# Patient Record
Sex: MALE | Race: WHITE | Employment: FULL TIME | URBAN - METROPOLITAN AREA
[De-identification: names, ages, dates, MRNs, and addresses within clinical notes are randomized per-mention and may not be internally consistent; named-entity substitution may affect disease eponyms.]

---

## 2018-07-13 LAB — COLONOSCOPY, EXTERNAL: NORMAL

## 2019-09-10 ENCOUNTER — OFFICE VISIT (OUTPATIENT)
Dept: CARDIOLOGY CLINIC | Age: 69
End: 2019-09-10

## 2019-09-10 VITALS
HEART RATE: 64 BPM | SYSTOLIC BLOOD PRESSURE: 142 MMHG | WEIGHT: 245.2 LBS | DIASTOLIC BLOOD PRESSURE: 62 MMHG | RESPIRATION RATE: 18 BRPM | BODY MASS INDEX: 37.16 KG/M2 | HEIGHT: 68 IN

## 2019-09-10 DIAGNOSIS — E66.01 SEVERE OBESITY (HCC): ICD-10-CM

## 2019-09-10 DIAGNOSIS — R00.2 PALPITATIONS: ICD-10-CM

## 2019-09-10 DIAGNOSIS — I49.9 IRREGULAR HEART RATE: Primary | ICD-10-CM

## 2019-09-10 DIAGNOSIS — I49.9 IRREGULAR HEART RATE: ICD-10-CM

## 2019-09-10 RX ORDER — LEVOTHYROXINE SODIUM 100 UG/1
100 TABLET ORAL
COMMUNITY
End: 2021-04-04

## 2019-09-10 RX ORDER — BENAZEPRIL HYDROCHLORIDE AND HYDROCHLOROTHIAZIDE 10; 12.5 MG/1; MG/1
TABLET ORAL DAILY
COMMUNITY
End: 2021-01-16

## 2019-09-10 RX ORDER — TADALAFIL 5 MG/1
5 TABLET ORAL DAILY
COMMUNITY
End: 2021-07-27

## 2019-09-10 RX ORDER — TESTOSTERONE CYPIONATE 200 MG/ML
INJECTION INTRAMUSCULAR EVERY 2 WEEKS
COMMUNITY
End: 2021-01-15

## 2019-09-10 RX ORDER — CLOPIDOGREL BISULFATE 75 MG/1
75 TABLET ORAL DAILY
COMMUNITY
End: 2021-04-04

## 2019-09-10 RX ORDER — MINOCYCLINE HYDROCHLORIDE 100 MG/1
100 CAPSULE ORAL DAILY
COMMUNITY
End: 2021-07-08

## 2019-09-10 RX ORDER — ATORVASTATIN CALCIUM 40 MG/1
TABLET, FILM COATED ORAL DAILY
COMMUNITY
End: 2020-05-20 | Stop reason: DRUGHIGH

## 2019-09-10 RX ORDER — TAMSULOSIN HYDROCHLORIDE 0.4 MG/1
0.4 CAPSULE ORAL DAILY
COMMUNITY
End: 2021-01-27 | Stop reason: SDUPTHER

## 2019-09-10 NOTE — LETTER
9/10/19 Patient: Dominick Hood YOB: 1950 Date of Visit: 9/10/2019 Marivel Fischer, [address] [facility] [address] [address] [place] [address] VIA Facsimile: [phone] Dear Marivel Fischer MD, Thank you for referring Mr. Dominick Hood to [address]  for evaluation. My notes for this consultation are attached. If you have questions, please do not hesitate to call me. I look forward to following your patient along with you.  
 
 
Sincerely, 
 
Ricardo Hatch MD

## 2019-09-10 NOTE — PROGRESS NOTES
Visit Vitals  /62 (BP 1 Location: Left arm)   Pulse 64   Resp 18   Ht 5' 8\" (1.727 m)   Wt 245 lb 3.2 oz (111.2 kg)   BMI 37.28 kg/m²     New patient. Here for an irregular heart rate.

## 2019-09-10 NOTE — PROGRESS NOTES
LAST OFFICE VISIT : Visit date not found        ICD-10-CM ICD-9-CM   1. Irregular heart rate I49.9 427.9   2. Severe obesity ([facility] [address] E66.01 278.01            Henna May is a 77 y.o. male with dyslipidemia, LEO, and HTN referred for irregular heart beat. Cardiac risk factors: dyslipidemia, HTN, male gender  I have personally obtained the history from the patient. HISTORY OF PRESENTING ILLNESS     Pt has never seen a cardiologist before. Pt has known he has an irregular heartbeat in the last 3 years because he feels it, but he has noticed it a lot more in the last year. Pt has noticed it at any particular time of day or with any particular activity. Pt noticed the irregular heartbeat most in May and noticed some tightness in his chest around that time also. His chest tightness was not associated with activity and it does not come upon an hour after eating. The chest tightness has never woken him out of sleep. It lasts about 5 minutes. Pt states that he has also experienced a rapid HR but has not experienced in within the past year. Pt states that for the past few years he has also noticed LE edema. Pt does not believe it is related to his diet. Pt started using injectable testosterone a month ago, before that he was using topical. Pt states that he has never had a heart attack, but he has had a mini-stroke in the past.  Pt's mother had a stroke. Pt notes that he is taking thyroid medicine and he believes his TSH is checked regularly. Pt wears his CPAP regularly. Pt flosses regularly. The patient denies chest pain/ shortness of breath, orthopnea, PND, LE edema, syncope, presyncope or fatigue. ACTIVE PROBLEM LIST     Patient Active Problem List    Diagnosis Date Noted    Severe obesity ([facility] [address] 09/10/2019           PAST MEDICAL HISTORY     No past medical history on file. PAST SURGICAL HISTORY     No past surgical history on file.        ALLERGIES     No Known Allergies       FAMILY HISTORY No family history on file. negative for cardiac disease       SOCIAL HISTORY     Social History     Socioeconomic History    Marital status:      Spouse name: Not on file    Number of children: Not on file    Years of education: Not on file    Highest education level: Not on file   Tobacco Use    Smoking status: Never Smoker    Smokeless tobacco: Never Used   Substance and Sexual Activity    Alcohol use: Never     Frequency: Never         MEDICATIONS     Current Outpatient Medications   Medication Sig    atorvastatin (LIPITOR) 40 mg tablet Take  by mouth daily.  benazepril-hydroCHLOROthiazide (LOTENSIN HCT) 10-12.5 mg per tablet Take  by mouth daily.  clopidogrel (PLAVIX) 75 mg tab Take  by mouth daily.  levothyroxine (SYNTHROID) 100 mcg tablet Take  by mouth Daily (before breakfast).  minocycline (MINOCIN, DYNACIN) 100 mg capsule Take  by mouth daily.  tamsulosin (FLOMAX) 0.4 mg capsule Take 0.4 mg by mouth daily.  tadalafil (CIALIS) 5 mg tablet Take 5 mg by mouth daily.  testosterone cypionate (DEPOTESTOTERONE CYPIONATE) 200 mg/mL injection by IntraMUSCular route Once every 2 weeks. No current facility-administered medications for this visit. I have reviewed the nurses notes, vitals, problem list, allergy list, medical history, family, social history and medications. REVIEW OF SYMPTOMS      General: Pt denies excessive weight gain or loss. Pt is able to conduct ADL's  HEENT: Denies blurred vision, headaches, hearing loss, epistaxis and difficulty swallowing. Respiratory: Denies cough, congestion, shortness of breath, SEGOVIA, wheezing or stridor.   Cardiovascular: Denies precordial pain, edema or PND Positive for palpitations  Gastrointestinal: Denies poor appetite, indigestion, abdominal pain or blood in stool  Genitourinary: Denies hematuria, dysuria, increased urinary frequency  Musculoskeletal: Denies joint pain or swelling from muscles or joints  Neurologic: Denies tremor, paresthesias, headache, or sensory motor disturbance  Psychiatric: Denies confusion, insomnia, depression  Integumentray: Denies rash, itching or ulcers. Hematologic: Denies easy bruising, bleeding     PHYSICAL EXAMINATION      Vitals:    09/10/19 1501   BP: 142/62   Pulse: 64   Resp: 18   Weight: 245 lb 3.2 oz (111.2 kg)   Height: 5' 8\" (1.727 m)     General: Well developed, in no acute distress. HEENT: No jaundice, oral mucosa moist, no oral ulcers  Neck: Supple, no stiffness, no lymphadenopathy, supple  Heart:  Normal S1/S2 negative S3 or S4. Regular, no murmur, gallop or rub, no jugular venous distention  Respiratory: Clear bilaterally x 4, no wheezing or rales  Abdomen:   Soft, non-tender, bowel sounds are active. Extremities:  1+ pitting edema, scars on both knees due to knee replacements  Musculoskeletal: No clubbing, no deformities  Neuro: A&Ox3, speech clear, gait stable, cooperative, no focal neurologic deficits  Skin: Skin color is normal. No rashes or lesions. Non diaphoretic, moist.  Vascular: 2+ pulses symmetric in all extremities        EKG: SB     DIAGNOSTIC DATA     No specialty comments available. LABORATORY DATA            No results found for: WBC, HGBPOC, HGB, HGBP, HCTPOC, HCT, PHCT, RBCH, PLT, MCV, HGBEXT, HCTEXT, PLTEXT, HGBEXT, HCTEXT, PLTEXT, HGBEXT, HCTEXT, PLTEXT   No results found for: NA, K, CL, CO2, AGAP, GLU, BUN, CREA, BUCR, GFRAA, GFRNA, CA, TBIL, TBILI, GPT, SGOT, AP, TP, ALB, GLOB, AGRAT, ALT        ASSESSMENT/RECOMMENDATIONS:.      1. Irregular heart beat  - I believe what he is experiencing are PAC's. In order to better define what is happening I favor checking a TSH as well as an echo to look at atrial size and a loop recorder. 2. HTN  - Blood pressure is borderline. I will not adjust any medicines at this time. If he is having a lot of PAC's I will consider placing him on a low dose of a BB.    3. Dyslipidemia  - We do not have any lipids on record. Will give him a lab slip to have his cholesterol checked. 4. LEO on CPAP  - Is tolerating and complaint  5. Return in 6 weeks or PRN. Orders Placed This Encounter    TSH 3RD GENERATION     Standing Status:   Future     Standing Expiration Date:   9/10/2020    HEPATIC FUNCTION PANEL     Standing Status:   Future     Standing Expiration Date:   9/10/2020    LIPID PANEL    LOOP MONITOR     Standing Status:   Future     Standing Expiration Date:   3/10/2020     Order Specific Question:   Reason for Exam:     Answer:   chest discomfort    AMB POC EKG ROUTINE W/ 12 LEADS, INTER & REP     Order Specific Question:   Reason for Exam:     Answer:   irregular heart rate    atorvastatin (LIPITOR) 40 mg tablet     Sig: Take  by mouth daily.  benazepril-hydroCHLOROthiazide (LOTENSIN HCT) 10-12.5 mg per tablet     Sig: Take  by mouth daily.  clopidogrel (PLAVIX) 75 mg tab     Sig: Take  by mouth daily.  levothyroxine (SYNTHROID) 100 mcg tablet     Sig: Take  by mouth Daily (before breakfast).  minocycline (MINOCIN, DYNACIN) 100 mg capsule     Sig: Take  by mouth daily.  tamsulosin (FLOMAX) 0.4 mg capsule     Sig: Take 0.4 mg by mouth daily.  tadalafil (CIALIS) 5 mg tablet     Sig: Take 5 mg by mouth daily.  testosterone cypionate (DEPOTESTOTERONE CYPIONATE) 200 mg/mL injection     Sig: by IntraMUSCular route Once every 2 weeks. Follow-up and Dispositions  ·   Return in about 6 months (around 3/10/2020). I have discussed the diagnosis with  Dominick Hood and the intended plan as seen in the above orders. Questions were answered concerning future plans. I have discussed medication side effects and warnings with the patient as well. Thank you,  Cristina Heard MD for involving me in the care of  Dominick Hood. Please do not hesitate to contact me for further questions/concerns.      Written by Elina Gaviria, as dictated by Ricardo Hatch MD. Vicente Del Real MD, [facility]    Patient Care Team:  Niall Mccray MD as PCP - General (Family Practice)    [address], [address] [place] [address] Drive      [phone] / [phone] Fax

## 2019-09-15 LAB
ALBUMIN SERPL-MCNC: 4.1 G/DL (ref 3.6–4.8)
ALP SERPL-CCNC: 81 IU/L (ref 39–117)
ALT SERPL-CCNC: 9 IU/L (ref 0–44)
AST SERPL-CCNC: 11 IU/L (ref 0–40)
BILIRUB DIRECT SERPL-MCNC: 0.2 MG/DL (ref 0–0.4)
BILIRUB SERPL-MCNC: 0.7 MG/DL (ref 0–1.2)
CHOLEST SERPL-MCNC: 97 MG/DL (ref 100–199)
HDLC SERPL-MCNC: 31 MG/DL
INTERPRETATION, 910389: NORMAL
LDLC SERPL CALC-MCNC: 43 MG/DL (ref 0–99)
PROT SERPL-MCNC: 6.1 G/DL (ref 6–8.5)
TRIGL SERPL-MCNC: 117 MG/DL (ref 0–149)
TSH SERPL DL<=0.005 MIU/L-ACNC: 2.41 UIU/ML (ref 0.45–4.5)
VLDLC SERPL CALC-MCNC: 23 MG/DL (ref 5–40)

## 2019-09-19 DIAGNOSIS — E66.01 SEVERE OBESITY (HCC): ICD-10-CM

## 2019-09-19 DIAGNOSIS — I49.9 IRREGULAR HEART RATE: Primary | ICD-10-CM

## 2019-09-19 DIAGNOSIS — R00.2 PALPITATIONS: ICD-10-CM

## 2019-09-24 ENCOUNTER — CLINICAL SUPPORT (OUTPATIENT)
Dept: CARDIOLOGY CLINIC | Age: 69
End: 2019-09-24

## 2019-09-24 DIAGNOSIS — E66.01 SEVERE OBESITY (HCC): ICD-10-CM

## 2019-09-24 DIAGNOSIS — I49.9 IRREGULAR HEART RATE: ICD-10-CM

## 2019-10-22 ENCOUNTER — DOCUMENTATION ONLY (OUTPATIENT)
Dept: CARDIOLOGY CLINIC | Age: 69
End: 2019-10-22

## 2019-10-22 NOTE — LETTER
10/29/2019 1:44 PM 
 
Mr. Candelaria [address] [id number] MrZe Adrian Mauricio, Your recent echocardiogram showed normal pumping function and mild leaky valve. Good BP control and low sodium diet will help with the leaky valve.   
 
 
 
 
 
 
 
 
Sincerely, 
 
 
Rosanne Anderson MD

## 2019-11-06 ENCOUNTER — OFFICE VISIT (OUTPATIENT)
Dept: CARDIOLOGY CLINIC | Age: 69
End: 2019-11-06

## 2019-11-06 VITALS
WEIGHT: 252.2 LBS | HEART RATE: 64 BPM | HEIGHT: 68 IN | DIASTOLIC BLOOD PRESSURE: 64 MMHG | BODY MASS INDEX: 38.22 KG/M2 | OXYGEN SATURATION: 96 % | SYSTOLIC BLOOD PRESSURE: 130 MMHG | RESPIRATION RATE: 16 BRPM

## 2019-11-06 DIAGNOSIS — E66.01 SEVERE OBESITY (HCC): ICD-10-CM

## 2019-11-06 DIAGNOSIS — R60.0 BILATERAL LEG EDEMA: Primary | ICD-10-CM

## 2019-11-06 DIAGNOSIS — G47.33 OSA ON CPAP: ICD-10-CM

## 2019-11-06 DIAGNOSIS — I49.9 IRREGULAR HEART RATE: Primary | ICD-10-CM

## 2019-11-06 DIAGNOSIS — I10 ESSENTIAL HYPERTENSION: ICD-10-CM

## 2019-11-06 DIAGNOSIS — Z99.89 OSA ON CPAP: ICD-10-CM

## 2019-11-06 DIAGNOSIS — R60.0 BILATERAL LEG EDEMA: ICD-10-CM

## 2019-11-06 NOTE — LETTER
11/6/19 Patient: Valdo Parker YOB: 1950 Date of Visit: 11/6/2019 Ganesh Centeno, [address] [address] VIA Facsimile: [phone] Dear Ganesh Centeno MD, Thank you for referring Mr. Valdo Parker to [address]  for evaluation. My notes for this consultation are attached. If you have questions, please do not hesitate to call me. I look forward to following your patient along with you.  
 
 
Sincerely, 
 
Vikki Mendez MD

## 2019-11-06 NOTE — PROGRESS NOTES
Visit Vitals  /64 (BP 1 Location: Left arm, BP Patient Position: Sitting)   Pulse 64   Resp 16   Ht 5' 8\" (1.727 m)   Wt 252 lb 3.2 oz (114.4 kg)   SpO2 96%   BMI 38.35 kg/m²

## 2019-11-06 NOTE — PROGRESS NOTES
LAST OFFICE VISIT : 9/10/19        ICD-10-CM ICD-9-CM   1. Irregular heart rate I49.9 427.9   2. Essential hypertension I10 401.9   3. LEO on CPAP G47.33 327.23    Z99.89 V46.8   4. Severe obesity ([facility] [address] E66.01 278.01   5. Bilateral leg edema R60.0 782. 3            Radha Zavala is a 77 y.o. male with dyslipidemia, LEO, and HTN referred for irregular heart beat. Cardiac risk factors: dyslipidemia, HTN, male gender  I have personally obtained the history from the patient. HISTORY OF PRESENTING ILLNESS       Radha Zavala reports he was experiencing skipped beats at times. Otherwise, he feels well. The patient denies chest pain/ shortness of breath, orthopnea, PND, LE edema, syncope, presyncope or fatigue. ACTIVE PROBLEM LIST     Patient Active Problem List    Diagnosis Date Noted    Severe obesity ([facility] 09/10/2019           PAST MEDICAL HISTORY     No past medical history on file. PAST SURGICAL HISTORY     No past surgical history on file. ALLERGIES     No Known Allergies       FAMILY HISTORY     No family history on file. negative for cardiac disease       SOCIAL HISTORY     Social History     Socioeconomic History    Marital status:      Spouse name: Not on file    Number of children: Not on file    Years of education: Not on file    Highest education level: Not on file   Tobacco Use    Smoking status: Never Smoker    Smokeless tobacco: Never Used   Substance and Sexual Activity    Alcohol use: Never     Frequency: Never         MEDICATIONS     Current Outpatient Medications   Medication Sig    atorvastatin (LIPITOR) 40 mg tablet Take  by mouth daily.  benazepril-hydroCHLOROthiazide (LOTENSIN HCT) 10-12.5 mg per tablet Take  by mouth daily.  clopidogrel (PLAVIX) 75 mg tab Take  by mouth daily.  levothyroxine (SYNTHROID) 100 mcg tablet Take  by mouth Daily (before breakfast).  minocycline (MINOCIN, DYNACIN) 100 mg capsule Take  by mouth daily.     tamsulosin (FLOMAX) 0.4 mg capsule Take 0.4 mg by mouth daily.  tadalafil (CIALIS) 5 mg tablet Take 5 mg by mouth daily.  testosterone cypionate (DEPOTESTOTERONE CYPIONATE) 200 mg/mL injection by IntraMUSCular route Once every 2 weeks. No current facility-administered medications for this visit. I have reviewed the nurses notes, vitals, problem list, allergy list, medical history, family, social history and medications. REVIEW OF SYMPTOMS      General: Pt denies excessive weight gain or loss. Pt is able to conduct ADL's  HEENT: Denies blurred vision, headaches, hearing loss, epistaxis and difficulty swallowing. Respiratory: Denies cough, congestion, shortness of breath, SEGOVIA, wheezing or stridor. Cardiovascular: Denies precordial pain, edema or PND  +palpitations  Gastrointestinal: Denies poor appetite, indigestion, abdominal pain or blood in stool  Genitourinary: Denies hematuria, dysuria, increased urinary frequency  Musculoskeletal: Denies joint pain or swelling from muscles or joints  Neurologic: Denies tremor, paresthesias, headache, or sensory motor disturbance  Psychiatric: Denies confusion, insomnia, depression  Integumentray: Denies rash, itching or ulcers. Hematologic: Denies easy bruising, bleeding     PHYSICAL EXAMINATION      Vitals:    11/06/19 1357   BP: 130/64   Pulse: 64   Resp: 16   SpO2: 96%   Weight: 252 lb 3.2 oz (114.4 kg)   Height: 5' 8\" (1.727 m)     General: Well developed, in no acute distress. HEENT: No jaundice, oral mucosa moist, no oral ulcers  Neck: Supple, no stiffness, no lymphadenopathy, supple  Heart:  Normal S1/S2 negative S3 or S4. Regular, no murmur, gallop or rub, no jugular venous distention  Respiratory: Clear bilaterally x 4, no wheezing or rales  Abdomen:   Soft, non-tender, bowel sounds are active.    Extremities:  1-2+ pitting LE edema, scars on both knees due to knee replacements  Musculoskeletal: No clubbing, no deformities  Neuro: A&Ox3, speech clear, gait stable, cooperative, no focal neurologic deficits  Skin: Skin color is normal. No rashes or lesions. Non diaphoretic, moist.  Vascular: 2+ pulses symmetric in all extremities        EKG:      DIAGNOSTIC DATA     1. Echo  10/21/19-EF 60%, LAE, AV sclerosis, Mild AI/MR/TR/PI, Pulmonary arterial systolic pressure is 90.1 mmHg. 2. Stress Test  10/21/19-Cardiolite- normal, mets 10.1    3. Lipids  9/14/19- TC 97, HDL 31, LDL 43,          LABORATORY DATA            No results found for: WBC, HGBPOC, HGB, HGBP, HCTPOC, HCT, PHCT, RBCH, PLT, MCV, HGBEXT, HCTEXT, PLTEXT, HGBEXT, HCTEXT, PLTEXT, HGBEXT, HCTEXT, PLTEXT   Lab Results   Component Value Date/Time    Bilirubin, total 0.7 09/14/2019 10:45 AM    AST (SGOT) 11 09/14/2019 10:45 AM    Alk. phosphatase 81 09/14/2019 10:45 AM    Protein, total 6.1 09/14/2019 10:45 AM    Albumin 4.1 09/14/2019 10:45 AM    ALT (SGPT) 9 09/14/2019 10:45 AM           ASSESSMENT/RECOMMENDATIONS:.      1. Irregular heart beat  - He wore a loop recorder with 6 significant events found to be bigeminal PVCs  - TSH was normal, 2.4  - Echo was normal with mild MR and TR  - Stress test was normal with no ectopy with exercise  - Will refrain from any treatment at this time since his sxs have improved and he feels this is related to stress at work. We reviewed the timing of these events. 2. HTN  - Blood pressure is borderline. I will not adjust any medicines at this time. If he is having a lot of PAC's I will consider placing him on a low dose of a BB. 3. Dyslipidemia  - Lipids are at goal on current medical regimen    4. LEO on CPAP  - Is tolerating and complaint    5. Possible TIA  - He is on Plavix    7. LE edema  - I think his edema is related to his bilateral knee replacements and my be a component of lymphedema  - I favor him seeing the lymphedema clinic and having venous studies on bilateral extremities. Return in 6 months or PRN.      No orders of the defined types were placed in this encounter. Follow-up and Dispositions  ·   Return in about 6 months (around 5/6/2020). I have discussed the diagnosis with  [facility] and the intended plan as seen in the above orders. Questions were answered concerning future plans. I have discussed medication side effects and warnings with the patient as well. Thank you,  Jens Girard MD for involving me in the care of  Sandy Garcia. Please do not hesitate to contact me for further questions/concerns. Written by Joanna Thomas, as dictated by Dennis Wade MD.      Kumar Mccoy. Maral Pfeiffer MD, [facility]    Patient Care Team:  Jens Girard MD as PCP - General (Family Practice)  Jens Girard MD (Family Practice)    [address], [address] [place] [address]      [phone] / [phone] Fax

## 2019-12-10 ENCOUNTER — TELEPHONE (OUTPATIENT)
Dept: CARDIOLOGY CLINIC | Age: 69
End: 2019-12-10

## 2019-12-10 NOTE — TELEPHONE ENCOUNTER
Patient is inquiring about the results of his vascular test. Patient has requested to call after 3:30 pm    Phone: [phone]

## 2020-01-22 LAB
CREATININE, EXTERNAL: 1
HBA1C MFR BLD HPLC: 5.8 %
LDL-C, EXTERNAL: 53
PSA, EXTERNAL: 4.6

## 2020-02-11 DIAGNOSIS — Z79.52 LONG TERM CURRENT USE OF SYSTEMIC STEROIDS: ICD-10-CM

## 2020-02-11 LAB — AMB DEXA, EXTERNAL: NORMAL

## 2020-02-11 PROCEDURE — 77080 DXA BONE DENSITY AXIAL: CPT

## 2020-03-19 DIAGNOSIS — I49.9 IRREGULAR HEART RATE: ICD-10-CM

## 2020-03-19 DIAGNOSIS — E66.01 SEVERE OBESITY (HCC): ICD-10-CM

## 2020-03-19 DIAGNOSIS — R00.2 PALPITATIONS: ICD-10-CM

## 2020-05-19 NOTE — PROGRESS NOTES
VIRTUAL VISIT DOCUMENTATION     Pursuant to the emergency declaration under the [facility] waiver authority and the [employer] Act, this Virtual  Visit was conducted, with patient's consent, to reduce the patient's risk of exposure to COVID-19 and provide continuity of care for an established patient. Services were provided through a video synchronous discussion virtually to substitute for in-person clinic visit. CHIEF COMPLAINT      Maddi Isbell is a 77 y.o. male who was seen by synchronous (real-time) audio-video technology on 5/20/2020. Patient is being seen today for hypertension, palpitations, LEO on CPAP, and bilateral lower extremity edema. ASSESSMENT        ICD-10-CM ICD-9-CM    1. Essential hypertension I10 401.9    2. Palpitations R00.2 785.1    3. LEO on CPAP G47.33 327.23     Z99.89 V46.8    4. Bilateral leg edema R60.0 782.3         PLAN   1. Irregular heart beat/fluttering   - He wore a loop recorder with 6 significant events found to be bigeminal PVCs  -Irregularity is less seems to be associated with stress recently.     2. HTN  - Blood pressure is has been at  and he says it has been normal  -less sodium     3. Dyslipidemia  - Lipids are at goal on current medical regimen primary care is reduce his Crestor recently to 20 mg  -An LDL goal is 70 or lower  -     4. LEO on CPAP  - Is tolerating and complaint     5. Possible TIA  - He is on Plavix     Follow-up with me in 6 months or PRN    We discussed the expected course, resolution and complications of the diagnosis(es) in detail. Medication risks, benefits, costs, interactions, and alternatives were discussed as indicated. I advised him to contact the office if his condition worsens, changes or fails to improve as anticipated.  He expressed understanding with the diagnosis(es) and plan    HISTORY OF PRESENTING ILLNESS      Marin Rodriguez Anna Leon is a 77 y.o. male   with dyslipidemia, LEO, and HTN referred for irregular heart beat. He states he has been doing well. Several weeks ago he had a short possibly 40 seconds. Of some skipping in his heartbeat I believe. 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 We reviewed his last loop recorder he did have periods of fast heart rates up to 140s and at night when he was sleeping or down to 40 something. He knows that if he gets dizzy he is to call.     Cardiac risk factors: dyslipidemia, HTN, male gender  I have personally obtained the history from the patient.             ATTENTION:   This medical record was transcribed using an electronic medical records/speech recognition system. Although proofread, it may and can contain electronic, spelling and other errors. Corrections may be executed at a later time. Please feel free to contact us for any clarifications as needed. ACTIVE PROBLEM LIST     Patient Active Problem List    Diagnosis Date Noted    Severe obesity ([facility] [address] 09/10/2019           PAST MEDICAL HISTORY     No past medical history on file. PAST SURGICAL HISTORY     No past surgical history on file. ALLERGIES     No Known Allergies       FAMILY HISTORY     No family history on file. negative for cardiac disease       SOCIAL HISTORY     Social History     Socioeconomic History    Marital status:      Spouse name: Not on file    Number of children: Not on file    Years of education: Not on file    Highest education level: Not on file   Tobacco Use    Smoking status: Never Smoker    Smokeless tobacco: Never Used   Substance and Sexual Activity    Alcohol use: Never     Frequency: Never         MEDICATIONS     Current Outpatient Medications   Medication Sig    atorvastatin (Lipitor) 20 mg tablet Take 20 mg by mouth daily.  latanoprost (XALATAN) 0.005 % ophthalmic solution Administer 1 Drop to both eyes nightly.  benazepril-hydroCHLOROthiazide (LOTENSIN HCT) 10-12.5 mg per tablet Take  by mouth daily.  clopidogrel (PLAVIX) 75 mg tab Take  by mouth daily.  levothyroxine (SYNTHROID) 100 mcg tablet Take  by mouth Daily (before breakfast).  minocycline (MINOCIN, DYNACIN) 100 mg capsule Take  by mouth daily.  tamsulosin (FLOMAX) 0.4 mg capsule Take 0.4 mg by mouth daily.  tadalafil (CIALIS) 5 mg tablet Take 5 mg by mouth daily.  testosterone cypionate (DEPOTESTOTERONE CYPIONATE) 200 mg/mL injection by IntraMUSCular route Once every 2 weeks. No current facility-administered medications for this visit. I have reviewed the nurses notes, vitals, problem list, allergy list, medical history, family, social history and medications. REVIEW OF SYMPTOMS     Constitutional: Negative for fever, chills, malaise/fatigue and diaphoresis. Respiratory: Negative for cough, hemoptysis, sputum production, shortness of breath and wheezing. Cardiovascular: Negative for chest pain, palpitations, orthopnea, claudication, leg swelling and PND. Gastrointestinal: Negative for heartburn, nausea, vomiting, blood in stool and melena. Genitourinary: Negative for dysuria and flank pain. Musculoskeletal: Negative for joint pain and back pain. Skin: Negative for rash. Neurological: Negative for focal weakness, seizures, loss of consciousness, weakness and headaches. Endo/Heme/Allergies: Negative for abnormal bleeding. Psychiatric/Behavioral: Negative for memory loss. PHYSICAL EXAMINATION      Due to this being a TeleHealth evaluation, many elements of the physical examination are unable to be assessed. General: Well developed, in no acute distress, cooperative and alert  HEENT: Pupils equal/round. No marked JVD visible on video.   Respiratory: No audible wheezing, no signs of respiratory distress, lips non cyanotic  Extremities:  No edema  Neuro: A&Ox3, speech clear, no facial droop, answering questions appropriately  Skin: Skin color is normal. No rashes or lesions. Non diaphoretic on visible skin during exam       DIAGNOSTIC DATA      1. Echo  10/21/19-EF 60%, LAE, AV sclerosis, Mild AI/MR/TR/PI, Pulmonary arterial systolic pressure is 25.8 mmHg. 2. Stress Test  10/21/19-Cardiolite- normal, mets 10.1    3. Lipids  9/14/19- TC 97, HDL 31, LDL 43,   1/23/20- , HDL 40, LDL 53,     4. LE Duplex  11/13/19-No evidence of deep vein thrombosis or venous obstruction within the lower extremities bilaterally       LABORATORY DATA    No results found for: WBC, HGBPOC, HGB, HGBP, HCTPOC, HCT, PHCT, RBCH, PLT, MCV, HGBEXT, HCTEXT, PLTEXT, HGBEXT, HCTEXT, PLTEXT   Lab Results   Component Value Date/Time    Bilirubin, total 0.7 09/14/2019 10:45 AM    AST (SGOT) 11 09/14/2019 10:45 AM    Alk. phosphatase 81 09/14/2019 10:45 AM    Protein, total 6.1 09/14/2019 10:45 AM    Albumin 4.1 09/14/2019 10:45 AM    ALT (SGPT) 9 09/14/2019 10:45 AM             FOLLOW-UP     Follow-up and Dispositions    · Return in about 6 months (around 11/20/2020). Patient was made aware and verbalized understanding that an appointment will be scheduled for them for a virtual visit and/or office visit within the above time frame. Patient understanding his/her responsibility to call and change time/date if he/she so chooses. Thank you, Josette Pitts NP for allowing me to participate in the care of Elise Cui. Please do not hesitate to contact me for further questions/concerns. Greater than 20 minutes was spent in direct video patient care, planning and chart review. This visit was conducted using [employer] services.        Bhavana Rivas MD    [address], [address] [place] [address]        [phone] / [phone] Fax       [facility] [facility]  [place]le Spence  [phone] / [phone] Fax

## 2020-05-20 ENCOUNTER — VIRTUAL VISIT (OUTPATIENT)
Dept: CARDIOLOGY CLINIC | Age: 70
End: 2020-05-20

## 2020-05-20 DIAGNOSIS — R00.2 PALPITATIONS: ICD-10-CM

## 2020-05-20 DIAGNOSIS — G47.33 OSA ON CPAP: ICD-10-CM

## 2020-05-20 DIAGNOSIS — R60.0 BILATERAL LEG EDEMA: ICD-10-CM

## 2020-05-20 DIAGNOSIS — Z99.89 OSA ON CPAP: ICD-10-CM

## 2020-05-20 DIAGNOSIS — I10 ESSENTIAL HYPERTENSION: Primary | ICD-10-CM

## 2020-05-20 RX ORDER — LATANOPROST 50 UG/ML
1 SOLUTION/ DROPS OPHTHALMIC
COMMUNITY

## 2020-05-20 RX ORDER — ATORVASTATIN CALCIUM 20 MG/1
20 TABLET, FILM COATED ORAL DAILY
COMMUNITY
End: 2021-02-01 | Stop reason: SDUPTHER

## 2020-07-22 ENCOUNTER — HOSPITAL ENCOUNTER (OUTPATIENT)
Dept: PHYSICAL THERAPY | Age: 70
Discharge: HOME | End: 2020-07-22
Payer: COMMERCIAL

## 2020-07-22 PROCEDURE — 97140 MANUAL THERAPY 1/> REGIONS: CPT

## 2020-07-22 PROCEDURE — 97535 SELF CARE MNGMENT TRAINING: CPT

## 2020-07-22 PROCEDURE — 97161 PT EVAL LOW COMPLEX 20 MIN: CPT

## 2020-07-22 NOTE — PROGRESS NOTES
[facility] [address] [facility]  [address] [place] [address] [facility]  [address]      INITIAL EVALUATION    NAME: James Sal AGE: 77 y.o. GENDER: male  DATE: 07/22/20  REFERRING PHYSICIAN: Pat Mathur MD (cardiology)  HISTORY AND BACKGROUND:   Primary Diagnosis:  · BLE lymphedema, secondary (I89.0)  Other Treatment Diagnoses:   Swelling not relieved by elevation (R60.0 localized edema,)   Venous insufficiency I87.2;     Date of Onset: reports a 20+ year history of fluctuating LE edema, mostly in the foot and ankle. In the past few years exacerbated by varicose issues    Present Symptoms and Functional Limitations: LE swelling has fluctuations and difficulty with wearing shoes. Reports improvement in the past few months      Lower Extremity Functional Scale: 15/68    Past Medical History: No past medical history on file. No past surgical history on file. Current Medications:    Current Outpatient Medications   Medication Sig    atorvastatin (Lipitor) 20 mg tablet Take 20 mg by mouth daily.  latanoprost (XALATAN) 0.005 % ophthalmic solution Administer 1 Drop to both eyes nightly.  benazepril-hydroCHLOROthiazide (LOTENSIN HCT) 10-12.5 mg per tablet Take  by mouth daily.  clopidogrel (PLAVIX) 75 mg tab Take  by mouth daily.  levothyroxine (SYNTHROID) 100 mcg tablet Take  by mouth Daily (before breakfast).  minocycline (MINOCIN, DYNACIN) 100 mg capsule Take  by mouth daily.  tamsulosin (FLOMAX) 0.4 mg capsule Take 0.4 mg by mouth daily.  tadalafil (CIALIS) 5 mg tablet Take 5 mg by mouth daily.  testosterone cypionate (DEPOTESTOTERONE CYPIONATE) 200 mg/mL injection by IntraMUSCular route Once every 2 weeks. No current facility-administered medications for this encounter.       Allergies: No Known Allergies   Prior Level of Function/Social/Work History/Personal factors and/or comorbidities impacting plan of care: still working as a special  for [employer], active with yard work and grandkids   Living Situation: home      Trainable Caregiver?: wife   Self-care/ADLs: independent     Mobility: independent   Sleeping Arrangement:  In a bed   [address] [place] [address] Street: none   Other:   Previous Therapy:  none  Compression/Lymphedema Equipment:  None. Does report Mal hose usage following his knee replacements, but no other compression garment usage    SUBJECTIVE:   Patient reports he actually forgot about cardiologist recommending compression and referral to lymphedema due to time between appt (November) and now. COVID led to waiting list and now patient is just getting to this clinic. He states his swelling has improved since November but reports no changes in activity or medications. He states swelling fluctuates and responds to elevation and being off his feet. He is a  and will be returning to work next month and will be on his feet more often. Patients goals for therapy: \"reduce frequency of episodes\"    OBJECTIVE DATA SUMMARY:   EXAMINATION/PRESENTATION/DECISION MAKING:   Pain:    0/10        Self-Care and ADLs:  Grooming: Independent  Bathing: Independent    UB Dressing: Independent  LB Dressing: Independent    Don/Doff Shoes/Socks:  Independent  Toileting: Independent    Other:       Skin and Tissue Assessment:  Dermal Status:  [x]   Intact []  Dry   []  Tenuous [] Flaky   []  Wound/lesion present []  Scars   []  Dermatitis    Texture/Consistency:  [x]  Boggy [x]  Pitting Edema   []  Brawny []  Combination   []  Fibrotic/Woody    Pigmentation/Color Change:  [x]  Normal []  Hemosiderin   []  Red []  Erythematous   []  Hyperpigmented []  Hyperlipodermatosclerosis   Anomalies:  []  Lymphorrhea []  Vesicles   []  Petechiae []  Warty Vercusis   []  Bullae []  Papilloma   Circulatory:  []  XENA [x]  Varicosities: varicous veins present   []  Pulses [x]  Vascular studies ruled out DVT in past   []  DVT History    Nails:  [x]   Normal  []   Fungus  Stemmers Sign: negative  Vitals  Blood pressure  138/62  Heart rate (bpm)  65  Oxygen saturation 99%  Height:    5'9 Weight:    228.8 lbs  BMI:   33.8  (36 or greater: adversely affecting lymphedema)  Wound/Ulcer:  no      Wound Pain:   none  Volumetric Measurements:   Right:  3535.05mL Left:  3420.34mL   % Difference: 3.16 Dominance: Right   (See scanned graph)  Range of Motion: WFL  Strength: WFL  Sensation:  Intact  Mobility:  Bed/Chair Mobility:  Independent  Transfers:  Independent    Sitting Balance:  intact Standing Balance:  intact   Gait:  Independent  Wheelchair Mobility:  n/a   Endurance:  good Stairs:  Independent          Safety:  Patient is alert and oriented:  x4   Safety awareness:  yes   Fall Risk?:  no   Patient given written fall prevention handout: Yes   Precautions:  Standard lymphedema precautions to include avoiding blood pressure readings, injections and IVs or other procedures/acts that could lead to broken skin on affected area, and avoiding excessive heat, resistive activity or altitude without compression garment    Functional Measure:   LLIS: 15/68,  22% impairment    Physical Therapy Evaluation Charge Determination   History Examination Presentation Decision-Making   MEDIUM  Complexity : 1-2 comorbidities / personal factors will impact the outcome/ POC  LOW Complexity : 1-2 Standardized tests and measures addressing body structure, function, activity limitation and / or participation in recreation  LOW Complexity : Stable, uncomplicated  Other outcome measures LLIS  LOW       Based on the above components, the patient evaluation is determined to be of the following complexity level: LOW     Evaluation Time: 9:40-10:00    TREATMENT PROVIDED:   1.   Treatment description:    The patient was educated regarding the role and function of the lymphatic system, pathophysiology of lymphedema/lipedema, and instructed in the lymphedema management protocol of complete decongestive therapy (CDT). This includes skin care to prevent breakdown or infection, lymphedema exercises, custom compression therapy options (bandaging, compression garments, compression pump, Gonzella Matters, [employer], [employer] Kareem-Oswaldo, etc. as needed), and decongestion with manual lymphatic drainage as indicated. We discussed the need for consistent compression system for lymphedema management. Diaphragmatic breathing instruction and skin care education was performed,applying low pH lotion to extremity using upward strokes to stimulate lymphatic vessels. Patient with combination edema complicated by vascular insufficiency. Patient reports edema fluctuates and wearing compression he may not perform \"everyday\". IT was recommended daily daytime use of compression. Decision to proceeded with ready made stockings. Patient does have ankle involvement that may cause adverse fitting and may have to progress to custom stockings if he can not tolerate ready made stockings. Treatment time:  10:00-10:25  Minutes: 25    2. Treatment description:  Manual therapy  Patient measured for ready made Juzo 3215 CCL2 knee high, beige, closed toe stockings for bilateral LE. DME sent to [address] [place] [address] for obtaining garments  Treatment time:  10:25-10:35  Minutes: 10    ASSESSMENT:   Laura Thompson is a 77 y.o. male who presents with Stage II combination edema with venous insuffiencey involvement. Patient leg discrepancy 3.16% and able to be measured for ready made garments. Patient will return once garments arrive for fitting. Patient will benefit from complete decongestive therapy (CDT) including manual lymphatic drainage (MLD) technique, short-stretch textile bandages/compression system to decongest limb, and kinesiotaping as appropriate.   Patient will receive instruction in proper skin care to recognize signs/symptoms of and prevent infection, therapeutic exercise, and self-MLD for independent home program and restorative lymphatic performance. This care is medically necessary due to the infection risk with lymphedema, and to improve functional activities. CDT is necessary to resolve swelling to allow patient to return to wearing normal clothes/footwear, and prevent worsening of symptoms, such as venous stasis ulcerations, infections, or hospitalizations. Patient will be independent with home program strategies to allow improved ADL ability and mobility and to allow patient to return to greatest functional independence. Rehabilitation potential is considered to be good. Factors which may influence rehabilitation potential include venous involvement. Patient will benefit from 2-3 physical therapy visits over 6-8 weeks to optimize improvement in these areas. PLAN OF CARE:   Recommendations and Planned Interventions:  Manual lymph drainage/complete decongestive therapy  Multi-layer compression bandaging (short-stretch)  Compression garment fitting/provision  Lymphedema therapeutic exercise  AROM/PROM/Strength/Coordination  Self-care training  Functional mobility training  Education in skin care and lymphedema precautions  Self-MLD education per home program  Self-bandaging education per home program  Caregiver education as needed  Wound care as needed     GOALS    Long term goals  Time frame: 6-8 weeks  1.   Patient will have knowledge of the compression options and acquire a safe and       appropriate daytime and night time compression system to prevent             re-accumulation of fluid. 2.  Patient will be able to don/doff garments independently.  Garment system effectiveness will be evaluated prior to discharge for better long-term  management and outcomes. 3.   To transition patient to independent restorative phase of CDT.      Patient has participated in goal setting and agrees to work toward plan of care.   Patient was instructed to call if questions or concerns arise. Thank you for this referral.  Malik Pham, PT, DPT   Time Calculation: 60 mins    TREATMENT PLAN EFFECTIVE DATES:   7/22/2020 TO 10/20/2020  I have read the above plan of care for Yolanda Russell. I certify the above prescribed services are required by this patient and are medically necessary.   The above plan of care has been developed in conjunction with the lymphedema/physical therapist.       Physician Signature: ____________________________________Date:______________

## 2020-08-19 ENCOUNTER — HOSPITAL ENCOUNTER (OUTPATIENT)
Dept: PHYSICAL THERAPY | Age: 70
Discharge: HOME | End: 2020-08-19
Payer: COMMERCIAL

## 2020-08-19 PROCEDURE — 97760 ORTHOTIC MGMT&TRAING 1ST ENC: CPT

## 2020-08-19 NOTE — PROGRESS NOTES
_                                    [facility] [address] [facility]  [address] [place] [address].  [address]  Priti Olivares        LYmphedema Therapy/discharge  Visit: 2    [x]                  Daily note               []                 30 day/10th visit progress note    NAME: Jen Martin  DATE: 8/19/2020    GOALS  Long term goals  Time frame: 6-8 weeks  1.   Patient will have knowledge of the compression options and acquire a safe and       appropriate daytime and night time compression system to prevent             re-accumulation of fluid. MET   2.  Patient will be able to don/doff garments independently.  Garment system effectiveness will be evaluated prior to discharge for better long-term  management and outcomes. MET  3.   To transition patient to independent restorative phase of CDT.   MET       SUBJECTIVE REPORT: Patient arrives with purchased ready made Juzo compression stockings. Patient reports trying to don himself prior to visit and having difficulty due to orthopedic limitations.     Pain:                                Gait:  No change since eval  []  Independent gait without any assistive device for community distances  ADLs:  No change  Treatment Response:  No change  []   Patient reviewed packet received at evaluation  []   Patient completed home program as prescribed  []   Patient not fully compliant with home program to date  []   Patient waiting on compression garment arrival  Function: no change  []   Patient requiring less assistance with completion of home program  []   ADLs are requiring less assistance   []   Patient able to return to work/leisure activities    TREATMENT AND OBJECTIVE DATA SUMMARY:   Patient/Family Education:      Educated in skin care: []   Skin care products  []   Hygiene  []  Prevention of cellulitis  []   Wound care     Educated in exercise: []   Walking program  []   Zofia ball routine  []   Stick routine  []   ROM routine     Instructed in self MLD:   Written sequence given and reviewed with patient as well as demonstration and instruction during MLD portion of the session. Instructed in don/doff of compression system:   []   Multi layer bandage (MLB) donning principles and wear precautions  []   Day garments  []   Night garments     Therapeutic Activity  minutes   Treatment time:   Functional Mobility:    Fall risk:      Therapeutic Exercise/Procedure  minutes   Treatment time:   Zofia ball exercise program: Demonstration by therapist of written routine. Patient performed each x5 reps. Stick exercise program: N/A   Free exercises/ROM: N/A   Home program: Patient to perform daily to BID:  []   Skin care  []   Deep abdominal breathing  []   Exercise routine  []   Walking program  []   Rest in supine   []   Compression bandage  []   Compression garments  []   Vasopneumatic device  []   Wound care  []   Self MLD  []   Bring supplies to each therapy visit  []   Purchase necessary supplies  []   Weight loss program  []   Follow up with       Rationale: Exercise will increase the lymph angiomotoricity and tissue pressure of the skin and thus decrease swelling.      Modalities  minutes   Treatment time:   Vasopneumatic pump:      Orthotic Management : 9:45-10:45 am 60 minutes   Treatment time:   Area to decongest:    [] UE          []  Right     []  Left      [] Trunk      [] LE             []  Right     []  Left      [] Trunk         Sequence used and effectiveness: [] Secondary/Primary sequence for upper extremity with / without trunk involvement    [] Secondary/Primary sequence for lower extremities with / without trunk involvement     [] Modifications were made to manual lymph drainage sequence to exclude cervical techniques secondary to patient's age    [] Self MLD sequence/techniques/hand strokes   Skin/wound care/debridement:    Upper/Lower extremity compression:  patient with increased difficulty with forward flexion and knee flexion due to orthopedic issues. He required the use of Medi donning cerna to don the stockings. Fit adjustments required use of dysum on the floor and donning gloves for the patient to independently manage donning. Instructed and performed doffing technique independently. Reviewed garment wear schedule, wash instructions, and precautions with pt and her sister. Provided handout as well. Advised pt to don stockings first thing in the morning and wear until bedtime. Advised pt that stockings need to be removed and washed every night, drying them in the dryer on low heat to restore elasticity of stockings. Pt not to sleep in stockings under any circumstances due to risk of garments rolling, sliding, or creasing and thus impairing lymphatic return. Pt and sister in agreement with all instructions. Kinesiotaping: n/a   Girth/Volume measurement: Reviewed results of volumetric measurements taken on evaluation. TOTAL TREATMENT           ASSESSMENT:   Treatment effectiveness and tolerance: Patient fitted with Juzo 3515 CCL 2 knee high, closed toe compression stockings. Patient required a Medi cerna, dysum, and donning gloves to independently apply garments. Good fit noted with compression stockings. Order sent to [employer] for cerna purchase for home usage.    Progress toward goals: met     PLAN OF CARE:   Changes to the plan of care: Discharged, follow back in 6 months   Frequency: []  2 times a week  []  Weekly  []  Biweekly  []  Monthly   Other: 6 month re-check       Violet Reyes, PT, DPT, CLT

## 2020-11-25 ENCOUNTER — OFFICE VISIT (OUTPATIENT)
Dept: CARDIOLOGY CLINIC | Age: 70
End: 2020-11-25
Payer: COMMERCIAL

## 2020-11-25 VITALS
HEIGHT: 68 IN | HEART RATE: 76 BPM | OXYGEN SATURATION: 99 % | BODY MASS INDEX: 38.04 KG/M2 | DIASTOLIC BLOOD PRESSURE: 66 MMHG | WEIGHT: 251 LBS | SYSTOLIC BLOOD PRESSURE: 150 MMHG

## 2020-11-25 DIAGNOSIS — R00.2 PALPITATIONS: ICD-10-CM

## 2020-11-25 DIAGNOSIS — R00.2 PALPITATIONS: Primary | ICD-10-CM

## 2020-11-25 PROCEDURE — G8417 CALC BMI ABV UP PARAM F/U: HCPCS | Performed by: SPECIALIST

## 2020-11-25 PROCEDURE — 3017F COLORECTAL CA SCREEN DOC REV: CPT | Performed by: SPECIALIST

## 2020-11-25 PROCEDURE — G8754 DIAS BP LESS 90: HCPCS | Performed by: SPECIALIST

## 2020-11-25 PROCEDURE — 1101F PT FALLS ASSESS-DOCD LE1/YR: CPT | Performed by: SPECIALIST

## 2020-11-25 PROCEDURE — G8427 DOCREV CUR MEDS BY ELIG CLIN: HCPCS | Performed by: SPECIALIST

## 2020-11-25 PROCEDURE — 99214 OFFICE O/P EST MOD 30 MIN: CPT | Performed by: SPECIALIST

## 2020-11-25 PROCEDURE — G8753 SYS BP > OR = 140: HCPCS | Performed by: SPECIALIST

## 2020-11-25 PROCEDURE — G8536 NO DOC ELDER MAL SCRN: HCPCS | Performed by: SPECIALIST

## 2020-11-25 PROCEDURE — 93000 ELECTROCARDIOGRAM COMPLETE: CPT | Performed by: SPECIALIST

## 2020-11-25 PROCEDURE — G8432 DEP SCR NOT DOC, RNG: HCPCS | Performed by: SPECIALIST

## 2020-11-25 NOTE — PROGRESS NOTES
CARDIOLOGY OFFICE NOTE    Vicente Macdonald MD, [address], [place], [address] [facility]  Phone [phone]; Fax [phone]  Mobile [phone]   Voice Mail [phone]    LAST OFFICE VISIT : Visit date not found  Shanta De Dios NP       ATTENTION:   This medical record was transcribed using an electronic medical records/speech recognition system. Although proofread, it may and can contain electronic, spelling and other errors. Corrections may be executed at a later time. Please feel free to contact us for any clarifications as needed. ICD-10-CM ICD-9-CM   1. Palpitations  R00.2 785. 1            Pedro Luis Carlin is a 72 y.o. male with  referred for palpitations and lower extremity edema     . The patient denies chest pain/ shortness of breath, orthopnea, PND, LE edema, palpitations, syncope, presyncope or fatigue. Cardiac risk factors: dyslipidemia, HTN, male gender  I have personally obtained the history from the patient. HISTORY OF PRESENTING ILLNESS      Pedro Luis Carlin is a 72 y.o. male   with dyslipidemia, LEO, and HTN referred for irregular heart beat. He states his palpitations have improved. He denies any chest discomfort or shortness of breath.            ACTIVE PROBLEM LIST     Patient Active Problem List    Diagnosis Date Noted    Severe obesity ([address] 09/10/2019           PAST MEDICAL HISTORY     No past medical history on file. PAST SURGICAL HISTORY     No past surgical history on file. ALLERGIES     No Known Allergies       FAMILY HISTORY     No family history on file.  negative for cardiac disease       SOCIAL HISTORY     Social History     Socioeconomic History    Marital status:      Spouse name: Not on file    Number of children: Not on file    Years of education: Not on file    Highest education level: Not on file   Tobacco Use    Smoking status: Never Smoker    Smokeless tobacco: Never Used   Substance and Sexual Activity    Alcohol use: Never     Frequency: Never         MEDICATIONS     Current Outpatient Medications   Medication Sig    atorvastatin (Lipitor) 20 mg tablet Take 20 mg by mouth daily.  latanoprost (XALATAN) 0.005 % ophthalmic solution Administer 1 Drop to both eyes nightly.  benazepril-hydroCHLOROthiazide (LOTENSIN HCT) 10-12.5 mg per tablet Take  by mouth daily.  clopidogrel (PLAVIX) 75 mg tab Take  by mouth daily.  levothyroxine (SYNTHROID) 100 mcg tablet Take  by mouth Daily (before breakfast).  minocycline (MINOCIN, DYNACIN) 100 mg capsule Take  by mouth daily.  tamsulosin (FLOMAX) 0.4 mg capsule Take 0.4 mg by mouth daily.  tadalafil (CIALIS) 5 mg tablet Take 5 mg by mouth daily.  testosterone cypionate (DEPOTESTOTERONE CYPIONATE) 200 mg/mL injection by IntraMUSCular route Once every 2 weeks. No current facility-administered medications for this visit. I have reviewed the nurses notes, vitals, problem list, allergy list, medical history, family, social history and medications. REVIEW OF SYMPTOMS      General: Pt denies excessive weight gain or loss. Pt is able to conduct ADL's  HEENT: Denies blurred vision, headaches, hearing loss, epistaxis and difficulty swallowing. Respiratory: Denies cough, congestion, shortness of breath, SEGOVIA, wheezing or stridor. Cardiovascular: Denies precordial pain, palpitations, edema or PND  Gastrointestinal: Denies poor appetite, indigestion, abdominal pain or blood in stool  Genitourinary: Denies hematuria, dysuria, increased urinary frequency  Musculoskeletal: Denies joint pain or swelling from muscles or joints  Neurologic: Denies tremor, paresthesias, headache, or sensory motor disturbance  Psychiatric: Denies confusion, insomnia, depression  Integumentray: Denies rash, itching or ulcers.   Hematologic: Denies easy bruising, bleeding     PHYSICAL EXAMINATION      Vitals:    11/25/20 1555   BP: (!) 150/66   Pulse: 76 SpO2: 99%   Weight: 251 lb (113.9 kg)   Height: 5' 8\" (1.727 m)     General: Well developed, in no acute distress. HEENT: No jaundice, oral mucosa moist, no oral ulcers  Neck: Supple, no stiffness, no lymphadenopathy, supple  Heart:  Normal S1/S2 negative S3 or S4. Regular, no murmur, gallop or rub, no jugular venous distention  Respiratory: Clear bilaterally x 4, no wheezing or rales  Extremities: Wearing compression hose, normal cap refill, no cyanosis. Musculoskeletal: No clubbing, no deformities  Neuro: A&Ox3, speech clear, gait stable, cooperative, no focal neurologic deficits  Skin: Skin color is normal. No rashes or lesions. Non diaphoretic, moist.             DIAGNOSTIC DATA     1. Echo  10/21/19-EF 60%, LAE, AV sclerosis, Mild AI/MR/TR/PI, Pulmonary arterial systolic pressure is 81.0 mmHg. 2. Stress Test  10/21/19-Cardiolite- normal, mets 10.1    3. Lipids  9/14/19- TC 97, HDL 31, LDL 43,   1/23/20- , HDL 40, LDL 53,     4. LE Duplex  11/13/19-No evidence of deep vein thrombosis or venous obstruction within the lower extremities bilaterally    5. Loop  9/29-10/28/19- SR with PVC's, average HR 64, min 43, max 143         LABORATORY DATA          No results found for: WBC, HGBPOC, HGB, HGBP, HCTPOC, HCT, PHCT, RBCH, PLT, MCV, HGBEXT, HCTEXT, PLTEXT   Lab Results   Component Value Date/Time    Bilirubin, total 0.7 09/14/2019 10:45 AM    Alk. phosphatase 81 09/14/2019 10:45 AM    Protein, total 6.1 09/14/2019 10:45 AM    Albumin 4.1 09/14/2019 10:45 AM    ALT (SGPT) 9 09/14/2019 10:45 AM           PLAN   1. Irregular heart beat/fluttering   -      2. HTN  - Blood pressure is elevated today on recheck is 146/76  -We will follow his readings for the next week and call me with results     3. Dyslipidemia  -LDL goal is 70 or lower     4. LEO on CPAP  - Is tolerating and complaint     5. Possible TIA  -Remain on Plavix with no further episodes    6.   Lower extremity edema  -Edema has improved significantly     Follow-up with me in 6 months or PRN    We discussed the expected course, resolution and complications of the diagnosis(es) in detail. Medication risks, benefits, costs, interactions, and alternatives were discussed as indicated. I advised him to contact the office if his condition worsens, changes or fails to improve as anticipated. He expressed understanding with the diagnosis(es) and plan    Orders Placed This Encounter    AMB POC EKG ROUTINE W/ 12 LEADS, INTER & REP     Order Specific Question:   Reason for Exam:     Answer:   HTN       We discussed the expected course, resolution and complications of the diagnosis(es) in detail. Medication risks, benefits, costs, interactions, and alternatives were discussed as indicated. I advised him to contact the office if his condition worsens, changes or fails to improve as anticipated. He expressed understanding with the diagnosis(es) and plan              I have discussed the diagnosis with  Fidelia Conrad and the intended plan as seen in the above orders. Questions were answered concerning future plans. I have discussed medication side effects and warnings with the patient as well. Thank you,  Larry Antoine NP for involving me in the care of  Fidelia Conrad. Please do not hesitate to contact me for further questions/concerns. Vicente Khalil MD, [address] [place] [address] [address]      [facility], [address] [place] [address] [address]      [phone] / [phone] Fax

## 2020-11-25 NOTE — PROGRESS NOTES
Cathryn Sims is a 72 y.o. male    Visit Vitals  BP (!) 150/66 (BP 1 Location: Left arm, BP Patient Position: Sitting)   Pulse 76   Ht 5' 8\" (1.727 m)   Wt 251 lb (113.9 kg)   SpO2 99%   BMI 38.16 kg/m²       Chief Complaint   Patient presents with    Hypertension    Palpitations    Cholesterol Problem    Other     EDEMA       Chest pain NO  SOB NO  Dizziness OCCASIONALLY  Swelling YES LEGS  Recent hospital visit NO  Refills NO

## 2020-12-04 LAB
ALBUMIN SERPL-MCNC: 4.2 G/DL (ref 3.8–4.8)
ALP SERPL-CCNC: 99 IU/L (ref 39–117)
ALT SERPL-CCNC: 17 IU/L (ref 0–44)
AST SERPL-CCNC: 17 IU/L (ref 0–40)
BILIRUB DIRECT SERPL-MCNC: 0.2 MG/DL (ref 0–0.4)
BILIRUB SERPL-MCNC: 0.6 MG/DL (ref 0–1.2)
CHOLEST SERPL-MCNC: 129 MG/DL (ref 100–199)
HDLC SERPL-MCNC: 38 MG/DL
INTERPRETATION, 910389: NORMAL
LDLC SERPL CALC-MCNC: 63 MG/DL (ref 0–99)
PROT SERPL-MCNC: 6.2 G/DL (ref 6–8.5)
TRIGL SERPL-MCNC: 162 MG/DL (ref 0–149)
VLDLC SERPL CALC-MCNC: 28 MG/DL (ref 5–40)

## 2020-12-09 DIAGNOSIS — G47.33 OSA ON CPAP: ICD-10-CM

## 2020-12-09 DIAGNOSIS — R60.0 BILATERAL LEG EDEMA: ICD-10-CM

## 2020-12-09 DIAGNOSIS — R00.2 PALPITATIONS: ICD-10-CM

## 2020-12-09 DIAGNOSIS — I10 ESSENTIAL HYPERTENSION: Primary | ICD-10-CM

## 2020-12-09 DIAGNOSIS — E66.01 SEVERE OBESITY (HCC): ICD-10-CM

## 2020-12-09 DIAGNOSIS — Z99.89 OSA ON CPAP: ICD-10-CM

## 2020-12-09 DIAGNOSIS — I10 ESSENTIAL HYPERTENSION: ICD-10-CM

## 2020-12-09 DIAGNOSIS — I49.9 IRREGULAR HEART RATE: ICD-10-CM

## 2021-01-15 VITALS
OXYGEN SATURATION: 98 % | HEIGHT: 68 IN | WEIGHT: 244 LBS | SYSTOLIC BLOOD PRESSURE: 132 MMHG | BODY MASS INDEX: 36.98 KG/M2 | TEMPERATURE: 98.5 F | HEART RATE: 47 BPM | DIASTOLIC BLOOD PRESSURE: 70 MMHG

## 2021-01-15 PROBLEM — E29.1 HYPOGONADISM IN MALE: Status: ACTIVE | Noted: 2021-01-15

## 2021-01-15 PROBLEM — L71.9 ROSACEA: Status: ACTIVE | Noted: 2021-01-15

## 2021-01-15 PROBLEM — R73.03 PREDIABETES: Status: ACTIVE | Noted: 2021-01-15

## 2021-01-15 PROBLEM — R73.09 BLOOD GLUCOSE ABNORMAL: Status: ACTIVE | Noted: 2021-01-15

## 2021-01-15 PROBLEM — E78.2 MIXED HYPERLIPIDEMIA: Status: ACTIVE | Noted: 2021-01-15

## 2021-01-15 PROBLEM — N40.0 BENIGN PROSTATIC HYPERPLASIA: Status: ACTIVE | Noted: 2021-01-15

## 2021-01-15 PROBLEM — E03.8 OTHER SPECIFIED HYPOTHYROIDISM: Status: ACTIVE | Noted: 2021-01-15

## 2021-01-15 PROBLEM — I10 ESSENTIAL HYPERTENSION: Status: ACTIVE | Noted: 2021-01-15

## 2021-01-15 RX ORDER — TESTOSTERONE ENANTHATE 75 MG/.5ML
INJECTION SUBCUTANEOUS
COMMUNITY
Start: 2020-11-30 | End: 2021-07-20

## 2021-01-16 RX ORDER — BENAZEPRIL HYDROCHLORIDE AND HYDROCHLOROTHIAZIDE 10; 12.5 MG/1; MG/1
TABLET ORAL
Qty: 90 TAB | Refills: 3 | Status: SHIPPED | OUTPATIENT
Start: 2021-01-16 | End: 2022-01-09

## 2021-01-18 ENCOUNTER — OFFICE VISIT (OUTPATIENT)
Dept: FAMILY MEDICINE CLINIC | Age: 71
End: 2021-01-18
Payer: COMMERCIAL

## 2021-01-18 VITALS
HEART RATE: 69 BPM | BODY MASS INDEX: 33.34 KG/M2 | TEMPERATURE: 99.1 F | OXYGEN SATURATION: 99 % | DIASTOLIC BLOOD PRESSURE: 78 MMHG | SYSTOLIC BLOOD PRESSURE: 118 MMHG | HEIGHT: 68 IN | WEIGHT: 220 LBS

## 2021-01-18 DIAGNOSIS — E78.2 MIXED HYPERLIPIDEMIA: ICD-10-CM

## 2021-01-18 DIAGNOSIS — R73.03 PREDIABETES: ICD-10-CM

## 2021-01-18 DIAGNOSIS — G62.9 NEUROPATHY: ICD-10-CM

## 2021-01-18 DIAGNOSIS — N40.0 BENIGN PROSTATIC HYPERPLASIA, UNSPECIFIED WHETHER LOWER URINARY TRACT SYMPTOMS PRESENT: ICD-10-CM

## 2021-01-18 DIAGNOSIS — Z23 ENCOUNTER FOR IMMUNIZATION: ICD-10-CM

## 2021-01-18 DIAGNOSIS — Z00.00 WELLNESS EXAMINATION: Primary | ICD-10-CM

## 2021-01-18 DIAGNOSIS — I10 ESSENTIAL HYPERTENSION: ICD-10-CM

## 2021-01-18 PROBLEM — R73.09 BLOOD GLUCOSE ABNORMAL: Status: RESOLVED | Noted: 2021-01-15 | Resolved: 2021-01-18

## 2021-01-18 PROCEDURE — 90715 TDAP VACCINE 7 YRS/> IM: CPT | Performed by: STUDENT IN AN ORGANIZED HEALTH CARE EDUCATION/TRAINING PROGRAM

## 2021-01-18 PROCEDURE — 90471 IMMUNIZATION ADMIN: CPT | Performed by: STUDENT IN AN ORGANIZED HEALTH CARE EDUCATION/TRAINING PROGRAM

## 2021-01-18 PROCEDURE — 99397 PER PM REEVAL EST PAT 65+ YR: CPT | Performed by: STUDENT IN AN ORGANIZED HEALTH CARE EDUCATION/TRAINING PROGRAM

## 2021-01-18 PROCEDURE — 99214 OFFICE O/P EST MOD 30 MIN: CPT | Performed by: STUDENT IN AN ORGANIZED HEALTH CARE EDUCATION/TRAINING PROGRAM

## 2021-01-18 NOTE — PATIENT INSTRUCTIONS
Learning About Peripheral Neuropathy  What is peripheral neuropathy? Peripheral neuropathy is a problem that affects the peripheral nerves. These nerves lead from the spinal cord to other parts of the body. They control your sense of touch, how you feel pain and temperature, and your muscle strength. Most of the time the problem starts in the fingers and toes. As it gets worse, it moves into the limbs. It can cause pain and loss of feeling in the feet, legs, and hands. What causes it? There are several causes:  · Diabetes. This is the most common cause. If your blood sugar is too high for too long, it can damage the nerves. · Kidney problems. These can lead to toxic substances in the blood that damage nerves. · Low levels of thyroid hormone (hypothyroidism). This may cause swelling of the tissues around the nerves, which can put pressure on them. · Infectious or inflammatory diseases. Examples are HIV and Guillain-Barré syndrome. These diseases can damage the nerves. · Peripheral nerve injury. A physical injury can damage the nerves. Injuries can be from things like falls, car crashes, or playing sports. · Overusing alcohol and not eating a healthy diet. These can lead to your body not having enough of certain vitamins, such as vitamin B-12. This can damage nerves. · Being exposed to toxic substances. These include lead, mercury, and certain medicines, such as those used for chemotherapy. Sometimes the cause isn't known. What are the symptoms? Symptoms of peripheral neuropathy can occur slowly over time. The most common ones are:  · Numbness, tightness, and tingling, especially in the legs, hands, and feet. · Loss of feeling. · Burning, shooting, or stabbing pain in the legs, hands, and feet. Often the pain is worse at night. · Weakness and loss of balance. What can happen if you have it? If peripheral neuropathy gets worse, it can lead to a complete lack of feeling in your hands or feet. This can make you more likely to injure them. It may lead to calluses and blisters. It can also lead to bone and joint problems, infection, and ulcers. For instance, small, repeated injuries to the foot may lead to bigger problems. This can happen because you can't feel the injuries. Reduced feeling in the feet can also change your step, leading to bone or joint problems. If untreated, foot problems can become so severe that the foot or lower leg may have to be amputated. But treatment can slow down peripheral neuropathy. And it's a good idea to take care to avoid injury. How is it diagnosed? To diagnose peripheral neuropathy, your doctor will ask you about:  · Your symptoms. · Your medical history. This may include your use of alcohol, risk of HIV infection, or exposure to toxic substances. · Your family's medical history, including nerve disease. Your doctor may test how well you can feel touch, temperature, and pain. You may also have blood tests. These tests will help the doctor find out if you have conditions that can cause neuropathy. Examples are diabetes, vitamin deficiencies, thyroid disease, and kidney problems. How is it treated? Treatment for peripheral neuropathy can relieve symptoms. This is done by treating the health problem that's causing it. For example, if you have diabetes, keeping your blood sugar within your target range may help. Or maybe your body lacks certain vitamins caused by drinking too much alcohol. In that case, treatment may include eating a healthy diet, taking vitamins, and stopping alcohol use. You may have physical therapy. This can increase muscle strength and help build muscle control. Over-the-counter medicine can relieve mild nerve pain. Your doctor may also prescribe medicine to help with severe pain, numbness, tingling, and weakness. If you have neuropathy in your feet, it's a good idea to have them checked during each office visit.  This can help prevent problems. Some people find that physical therapy, acupuncture, or transcutaneous electrical nerve stimulation (TENS) helps relieve pain. Follow-up care is a key part of your treatment and safety. Be sure to make and go to all appointments, and call your doctor if you are having problems. It's also a good idea to know your test results and keep a list of the medicines you take. Where can you learn more? Go to http://www.gray.com/  Enter P130 in the search box to learn more about \"Learning About Peripheral Neuropathy. \"  Current as of: December 20, 2019               Content Version: 12.6  © 1688-0099 "[employer]. Care instructions adapted under license by [employer] (which disclaims liability or warranty for this information). If you have questions about a medical condition or this instruction, always ask your healthcare professional. [address] any warranty or liability for your use of this information.

## 2021-01-18 NOTE — PROGRESS NOTES
Subjective:     Chief Complaint   Patient presents with    Annual Wellness Visit     HPI:  Rajwinder Willis is a 72 y.o. male who is on the schedule today for annual wellness exam and is also due for follow-up of chronic issues. he is willing to do both appointments today and realizes that there may be a co-pay for the follow-up portion of the visit. For the wellness:  Flu- Recommended in the fall  Tetanus-will be due later this year as it was done in August 2012  Shingrix- not performed  [id number]- N/A  Prevnar 13- N/A  HCV screening- complete and negative in 2019  PSA-elevated at 4.6 last visit  Colon cancer screening-colonoscopy in 7/13/2018 is found to have a couple polyps which ended up being benign tubular adenomas. With instructions to follow-up in 5 years. AAA screening- N/A  Low dose CT scan- N/A  Smoking status- never  Moods- at goal, normal phq- 2 today. Exercise-as much as he would like  Dental Exams-regularly  Vision Exams-regularly    For the acute/chronic issues:      Complains of neuropathy symptoms. Gets pins-and-needles, and tingling in bilateral feet which has been worsening recently. Also get similar symptoms in his hands which occur less often. Also notes coldness in hands and feet. Does have history of prediabetes, cervical spine arthritic changes as well. Previously was found to have upper arm pain secondary to his cervical spine arthritis. Is also noted that the dark line of is now gets a little darker, and  sometimes there is a white discoloration his finger. He has history of TIA, on Plavix. Imaging was negative the time. Does not follow with neurology. History of palpitations, sees cardiology for this. Did have Holter monitor earlier this year which showed benign findings. [facility] cardiology for his hypertension. During visit blood pressure was elevated  in the office so he was asked to keep a BP log. His blood pressure has improved. BP normal today.     He was found to have elevated PSA, elevated testosterone last year. He was being treated with testosterone therapy at the time. Seeing urology for this. Testosterone was continued to be high, so they changed him to a subq. and has decreased his dosage to every 3 weeks and has helped his testosterone numbers. Biopsy of the prostate was unremarkable. History of lower extremity edema, which has improved tremendously with compression stockings. DVT ultrasound, echo are unremarkable. Follows with cardiology.       Past Medical History:   Diagnosis Date    Benign prostatic hyperplasia 1/15/2021    Blood glucose abnormal 1/15/2021    Essential hypertension 1/15/2021    Hypogonadism in male 1/15/2021    Mixed hyperlipidemia 1/15/2021    Other specified hypothyroidism 1/15/2021    Prediabetes 1/15/2021    Rosacea 1/15/2021     Family History   Problem Relation Age of Onset    Heart Disease Mother         had complications after heart valve surgery     Bleeding Prob Half-brother         Brain Tumor     Bleeding Prob Half-sister         Bone Cancer     Heart Attack Half-brother         2003    Heart Attack Half-brother         1980's     Social History     Socioeconomic History    Marital status:      Spouse name: Not on file    Number of children: Not on file    Years of education: Not on file    Highest education level: Not on file   Occupational History    Not on file   Social Needs    Financial resource strain: Not on file    Food insecurity     Worry: Not on file     Inability: Not on file   [employer] needs     Medical: Not on file     Non-medical: Not on file   Tobacco Use    Smoking status: Never Smoker    Smokeless tobacco: Never Used   Substance and Sexual Activity    Alcohol use: Never     Frequency: Never    Drug use: Never    Sexual activity: Not on file   Lifestyle    Physical activity     Days per week: Not on file     Minutes per session: Not on file    Stress: Not on file   Relationships    Social connections     Talks on phone: Not on file     Gets together: Not on file     Attends Anabaptist service: Not on file     Active member of club or organization: Not on file     Attends meetings of clubs or organizations: Not on file     Relationship status: Not on file    Intimate partner violence     Fear of current or ex partner: Not on file     Emotionally abused: Not on file     Physically abused: Not on file     Forced sexual activity: Not on file   Other Topics Concern    Not on file   Social History Narrative    Not on file     Current Outpatient Medications on File Prior to Visit   Medication Sig Dispense Refill    benazepril-hydroCHLOROthiazide (LOTENSIN HCT) 10-12.5 mg per tablet TAKE 1 TABLET DAILY FOR    BLOOD PRESSURE 90 Tab 3    Xyosted 75 mg/0.5 mL atIn INJECT 0.5ML INTO THE SKIN ONCE A WEEK      atorvastatin (Lipitor) 20 mg tablet Take 20 mg by mouth daily.  latanoprost (XALATAN) 0.005 % ophthalmic solution Administer 1 Drop to both eyes nightly.  clopidogrel (PLAVIX) 75 mg tab Take 75 mg by mouth daily.  levothyroxine (SYNTHROID) 100 mcg tablet Take 100 mcg by mouth Daily (before breakfast).  minocycline (MINOCIN, DYNACIN) 100 mg capsule Take 100 mg by mouth daily.  tamsulosin (FLOMAX) 0.4 mg capsule Take 0.4 mg by mouth daily.  tadalafil (CIALIS) 5 mg tablet Take 5 mg by mouth daily. No current facility-administered medications on file prior to visit. Allergies   Allergen Reactions    Zocor [Simvastatin] Myalgia     Review of Systems   Constitutional: Negative for fever. Respiratory: Negative for shortness of breath. Cardiovascular: Negative for chest pain. Genitourinary: Negative for dysuria. Neurological: Positive for tingling.         As stated in HPI         Objective:     Vitals:    01/18/21 0957   BP: 118/78   Pulse: 69   Temp: 99.1 °F (37.3 °C)   TempSrc: Temporal   SpO2: 99%   Weight: 220 lb (99.8 kg) Height: 5' 8\" (1.727 m)     Physical Exam  Vitals signs reviewed. HENT:      Head: Normocephalic and atraumatic. Cardiovascular:      Rate and Rhythm: Normal rate and regular rhythm. Heart sounds: Normal heart sounds. Pulmonary:      Effort: Pulmonary effort is normal.      Breath sounds: Normal breath sounds. Abdominal:      Tenderness: There is no abdominal tenderness. Neurological:      Mental Status: He is oriented to person, place, and time. Comments: Negative Tinel's sign, positive Phalen on the right side. Negative elbow Tinel sign. Psychiatric:         Behavior: Behavior normal.            Assessment/Plan:     1. Wellness examination        -     Update HCM. Follow-up labs. -      Advised to get shingles vaccine. 2. Neuropathy             -     Neuropathy symptoms and bilateral hands and feet, worse in his feet. Does have cervical arthritis, and looks like carpal tunnel syndrome. Will follow-up labs, and neurology was consulted. Everything normal will consider talking to him about Raynaud's (but unlikely). -     TSH 3RD GENERATION  -     METABOLIC PANEL, COMPREHENSIVE  -     HEMOGLOBIN A1C WITH EAG  -     VITAMIN D, 25 HYDROXY  -     MAGNESIUM  -     VITAMIN B12  -     FOLATE  -     REFERRAL TO NEUROLOGY    3. Essential hypertension        -      Follows with cardiology. Continue current management. -     CBC WITH AUTOMATED DIFF    4. Benign prostatic hyperplasia, unspecified whether lower urinary tract symptoms present       -Follows with urology. Continue current management. 5. Prediabetes  -     CBC WITH AUTOMATED DIFF  -     METABOLIC PANEL, COMPREHENSIVE  -     HEMOGLOBIN A1C WITH EAG    6. Mixed hyperlipidemia        -     Continue current management. Follow-up labs. -     LIPID PANEL    7. Encounter for immunization  -     TETANUS, DIPHTHERIA TOXOIDS AND ACELLULAR PERTUSSIS VACCINE (TDAP), IN INDIVIDS. >=7, IM    8.   History of TIA  -On Plavix and statin. Neurology was consulted. 9.  Elevated testosterone  -Testosterone was elevated while on treatment for low testosterone. Testosterone labs have improved. Urology following. Follow-up and Dispositions    · Return in about 6 months (around 7/18/2021) for Chronic Conditions.          Burgess Marisa MD

## 2021-01-19 LAB
25(OH)D3+25(OH)D2 SERPL-MCNC: 9.1 NG/ML (ref 30–100)
ALBUMIN SERPL-MCNC: 4.6 G/DL (ref 3.8–4.8)
ALBUMIN/GLOB SERPL: 2 {RATIO} (ref 1.2–2.2)
ALP SERPL-CCNC: 94 IU/L (ref 39–117)
ALT SERPL-CCNC: 15 IU/L (ref 0–44)
AST SERPL-CCNC: 18 IU/L (ref 0–40)
BASOPHILS # BLD AUTO: 0 X10E3/UL (ref 0–0.2)
BASOPHILS NFR BLD AUTO: 1 %
BILIRUB SERPL-MCNC: 0.9 MG/DL (ref 0–1.2)
BUN SERPL-MCNC: 17 MG/DL (ref 8–27)
BUN/CREAT SERPL: 17 (ref 10–24)
CALCIUM SERPL-MCNC: 9.2 MG/DL (ref 8.6–10.2)
CHLORIDE SERPL-SCNC: 102 MMOL/L (ref 96–106)
CHOLEST SERPL-MCNC: 127 MG/DL (ref 100–199)
CO2 SERPL-SCNC: 25 MMOL/L (ref 20–29)
CREAT SERPL-MCNC: 0.99 MG/DL (ref 0.76–1.27)
EOSINOPHIL # BLD AUTO: 0.1 X10E3/UL (ref 0–0.4)
EOSINOPHIL NFR BLD AUTO: 2 %
ERYTHROCYTE [DISTWIDTH] IN BLOOD BY AUTOMATED COUNT: 11.9 % (ref 11.6–15.4)
EST. AVERAGE GLUCOSE BLD GHB EST-MCNC: 103 MG/DL
FOLATE SERPL-MCNC: 11.8 NG/ML
GLOBULIN SER CALC-MCNC: 2.3 G/DL (ref 1.5–4.5)
GLUCOSE SERPL-MCNC: 82 MG/DL (ref 65–99)
HBA1C MFR BLD: 5.2 % (ref 4.8–5.6)
HCT VFR BLD AUTO: 50.3 % (ref 37.5–51)
HDLC SERPL-MCNC: 41 MG/DL
HGB BLD-MCNC: 16.8 G/DL (ref 13–17.7)
IMM GRANULOCYTES # BLD AUTO: 0 X10E3/UL (ref 0–0.1)
IMM GRANULOCYTES NFR BLD AUTO: 1 %
LDLC SERPL CALC-MCNC: 61 MG/DL (ref 0–99)
LYMPHOCYTES # BLD AUTO: 1.3 X10E3/UL (ref 0.7–3.1)
LYMPHOCYTES NFR BLD AUTO: 22 %
MAGNESIUM SERPL-MCNC: 2 MG/DL (ref 1.6–2.3)
MCH RBC QN AUTO: 32.4 PG (ref 26.6–33)
MCHC RBC AUTO-ENTMCNC: 33.4 G/DL (ref 31.5–35.7)
MCV RBC AUTO: 97 FL (ref 79–97)
MONOCYTES # BLD AUTO: 0.5 X10E3/UL (ref 0.1–0.9)
MONOCYTES NFR BLD AUTO: 9 %
NEUTROPHILS # BLD AUTO: 3.8 X10E3/UL (ref 1.4–7)
NEUTROPHILS NFR BLD AUTO: 65 %
PLATELET # BLD AUTO: 206 X10E3/UL (ref 150–450)
POTASSIUM SERPL-SCNC: 5.2 MMOL/L (ref 3.5–5.2)
PROT SERPL-MCNC: 6.9 G/DL (ref 6–8.5)
RBC # BLD AUTO: 5.18 X10E6/UL (ref 4.14–5.8)
SODIUM SERPL-SCNC: 139 MMOL/L (ref 134–144)
TRIGL SERPL-MCNC: 145 MG/DL (ref 0–149)
TSH SERPL DL<=0.005 MIU/L-ACNC: 2.37 UIU/ML (ref 0.45–4.5)
VIT B12 SERPL-MCNC: 290 PG/ML (ref 232–1245)
VLDLC SERPL CALC-MCNC: 25 MG/DL (ref 5–40)
WBC # BLD AUTO: 5.8 X10E3/UL (ref 3.4–10.8)

## 2021-01-22 NOTE — PROGRESS NOTES
Vitamin D is low at 9.1 but like to start him on 50,000 units of vitamin D3 weekly. After finishing with a 50,000 units weekly, he can begin taking 2000 units daily. Order will be placed when gets this message. We will have him recheck his vitamin D at 10 weeks from now. Rest of his labs are unremarkable. Low vitamin D may Contributing to some of his neuropathy symptoms.

## 2021-01-29 RX ORDER — TAMSULOSIN HYDROCHLORIDE 0.4 MG/1
0.4 CAPSULE ORAL DAILY
Qty: 90 CAP | Refills: 1 | Status: SHIPPED | OUTPATIENT
Start: 2021-01-29 | End: 2021-07-08

## 2021-02-01 DIAGNOSIS — E78.2 MIXED HYPERLIPIDEMIA: Primary | ICD-10-CM

## 2021-02-01 RX ORDER — ATORVASTATIN CALCIUM 20 MG/1
20 TABLET, FILM COATED ORAL DAILY
Qty: 90 TAB | Refills: 0 | Status: SHIPPED | OUTPATIENT
Start: 2021-02-01 | End: 2021-04-19

## 2021-02-01 NOTE — PROGRESS NOTES
Patient notified of results and would like the 50,000units of Vitamin D3 to be sent to [facility] on file and then the 2,000 Vitamin D3 be sent to [facility] on file for mail order 90 days

## 2021-02-03 DIAGNOSIS — E55.9 VITAMIN D DEFICIENCY: Primary | ICD-10-CM

## 2021-02-03 RX ORDER — MELATONIN
2000 DAILY
Qty: 90 TAB | Refills: 1 | Status: SHIPPED | OUTPATIENT
Start: 2021-04-01 | End: 2021-07-08 | Stop reason: SDUPTHER

## 2021-02-03 RX ORDER — ERGOCALCIFEROL 1.25 MG/1
50000 CAPSULE ORAL
Qty: 8 CAP | Refills: 0 | Status: SHIPPED | OUTPATIENT
Start: 2021-02-03 | End: 2021-07-08

## 2021-02-04 NOTE — PROGRESS NOTES
High dose vitamin D was sent to [facility], and 1000 unit vitamin d tablets were sent to mail order pharmacy to be started in the beginning of April.

## 2021-02-17 ENCOUNTER — OFFICE VISIT (OUTPATIENT)
Dept: NEUROLOGY | Age: 71
End: 2021-02-17
Payer: COMMERCIAL

## 2021-02-17 VITALS
DIASTOLIC BLOOD PRESSURE: 74 MMHG | RESPIRATION RATE: 20 BRPM | SYSTOLIC BLOOD PRESSURE: 152 MMHG | HEART RATE: 68 BPM | OXYGEN SATURATION: 98 %

## 2021-02-17 DIAGNOSIS — G45.9 TIA (TRANSIENT ISCHEMIC ATTACK): ICD-10-CM

## 2021-02-17 DIAGNOSIS — R20.2 PARESTHESIA OF BOTH HANDS: ICD-10-CM

## 2021-02-17 DIAGNOSIS — R20.2 PARESTHESIA OF BOTH FEET: Primary | ICD-10-CM

## 2021-02-17 PROCEDURE — 99205 OFFICE O/P NEW HI 60 MIN: CPT | Performed by: PSYCHIATRY & NEUROLOGY

## 2021-02-17 NOTE — PROGRESS NOTES
Neuropathy- after knee surgeries, some tenderness and swelling has been using the Rx stockings to help     Paying more attention that his hands and feet were tingling at different times of the day, cold fingers and his fingernails turn blue at the same time they were tingling   Started in the morning and would notice it in the evening time also   He saw his PCP which prescribed Vitamin D since that was low and advised he check here also   Has been happening for a few years, then started to notice it more during the winter time he couldn't get his hands warmed up

## 2021-02-17 NOTE — PROGRESS NOTES
NEUROLOGY CLINIC NOTE    Patient ID:  Bong Gunderson  [id number]  6 y.o.  1950    Date of Consultation:  February 17, 2021    Referring Physician: Dr. Simeon Mitchell    Reason for Consultation:  Hand and feet cold sensation  Chief Complaint   Patient presents with    New Patient     neuropathy        History of Present Illness:     Patient Active Problem List    Diagnosis Date Noted    Neuropathy 01/18/2021    Benign prostatic hyperplasia 01/15/2021    Essential hypertension 01/15/2021    Hypogonadism in male 01/15/2021    Other specified hypothyroidism 01/15/2021    Mixed hyperlipidemia 01/15/2021    Prediabetes 01/15/2021    Rosacea 01/15/2021    Severe obesity ([address] 09/10/2019     Past Medical History:   Diagnosis Date    Benign prostatic hyperplasia 1/15/2021    Blood glucose abnormal 1/15/2021    Essential hypertension 1/15/2021    Hypogonadism in male 1/15/2021    Mixed hyperlipidemia 1/15/2021    Other specified hypothyroidism 1/15/2021    Palpitations     Prediabetes 1/15/2021    Rosacea 1/15/2021    TIA (transient ischemic attack)     Per patient. Negative imgaing. Past Surgical History:   Procedure Laterality Date    HX CIRCUMCISION  2001    HX COLONOSCOPY  07/13/2018    2 adenomatous polyps removed -Dr. John Collins HX COLONOSCOPY  01/01/2012    HX CYST REMOVAL  1963    pilonidal cyst surgery    HX HERNIA REPAIR      HX KNEE REPLACEMENT Bilateral     HX ORTHOPAEDIC      Neuroma on foot removed     HX VASECTOMY      HX VEIN STRIPPING  12/2008    Dr. Donny Ratliff      Prior to Admission medications    Medication Sig Start Date End Date Taking? Authorizing Provider   ergocalciferol (ERGOCALCIFEROL) 1,250 mcg (50,000 unit) capsule Take 1 Cap by mouth every seven (7) days. 2/3/21  Yes Governor Kim MD   cholecalciferol (VITAMIN D3) (1000 Units /25 mcg) tablet Take 2 Tabs by mouth daily.  4/1/21  Yes Governor Kim MD   atorvastatin (Lipitor) 20 mg tablet Take 1 Tab by mouth daily. 2/1/21  Yes Guevara Myrick MD   tamsulosin [facility]) 0.4 mg capsule Take 1 Cap by mouth daily. 1/29/21  Yes Guevara Myrick MD   benazepril-hydroCHLOROthiazide (LOTENSIN HCT) 10-12.5 mg per tablet TAKE 1 TABLET DAILY FOR    BLOOD PRESSURE 1/16/21  Yes Kady Fisher NP   Glenny Raddle 75 mg/0.5 mL atIn INJECT 0.5ML INTO THE SKIN ONCE A WEEK 11/30/20  Yes Provider, Historical   latanoprost (XALATAN) 0.005 % ophthalmic solution Administer 1 Drop to both eyes nightly. Yes Provider, Historical   clopidogrel (PLAVIX) 75 mg tab Take 75 mg by mouth daily. Yes Provider, Historical   levothyroxine (SYNTHROID) 100 mcg tablet Take 100 mcg by mouth Daily (before breakfast). Yes Provider, Historical   minocycline (MINOCIN, DYNACIN) 100 mg capsule Take 100 mg by mouth daily. Yes Provider, Historical   tadalafil (CIALIS) 5 mg tablet Take 5 mg by mouth daily. Yes Provider, Historical     Allergies   Allergen Reactions    Zocor [Simvastatin] Myalgia      Social History     Tobacco Use    Smoking status: Never Smoker    Smokeless tobacco: Never Used   Substance Use Topics    Alcohol use: Never     Frequency: Never      Family History   Problem Relation Age of Onset    Heart Disease Mother         had complications after heart valve surgery     Bleeding Prob Half-brother         Brain Tumor     Bleeding Prob Half-sister         Bone Cancer     Heart Attack Half-brother         2003    Heart Attack Half-brother         1980's        Subjective:      Moustapha Vora is a 72 y.o. RHWM with history of BPH, HTN, hypogonadism, hyperlipidemia, thyroid disorder, prediabetes and TIA who was referred here by Dr. Trish Adams for further evaluation of hand and feet cold sensation. Noticeable for the past 2 years but longer than that. Coldness in the fingers and toes - mid morning or early evening in the winter  Bluish discoloration  Not much during the summertime   Feels unstable. No falls.   No weakness or loss of muscle bulk. Issues with ankle swelling better with compression stockings  Some discomfort but no pain  No sense of weakness in the hands or dropping things. 6 months sleeps with the arms up to prevent numbness and tingling. Resting elbow on armrest causes numbness and tingling bilateral 4th and 5th fingers    Teacher at [employer]    Denies any significant alcohol use. History of TIA. Placed on Aggrenox but due to nosebleeds and shifted to clopidrogel 75 mg daily    Patient was seen by his PCP last 1/18/2021. Note mentions pins and needle sensation and tingling in the feet. Less often in the hands. Also notes discoloration of the fingers. Labs done were unremarkable (CBC, CMP, B12, folate, magnesium, lipid panel, TSH and hgbA1c) except for extremely low vitamin D levels. Outside reports reviewed: office notes, lab reports, historical medical records. Review of Systems:    A comprehensive review of systems was performed:   Constitutional: positive for none  Eyes: positive for none  Ears, nose, mouth, throat, and face: positive for hearing loss  Respiratory: positive for none  Cardiovascular: positive for leg swelling  Gastrointestinal: positive for none  Genitourinary: positive for none  Integument/breast: positive for none  Hematologic/lymphatic: positive for none  Musculoskeletal: positive for joint pain  Neurological: positive for numbness/tingling   Behavioral/Psych: positive for none  Endocrine: positive for none  Allergic/Immunologic: positive for none      Objective:     Visit Vitals  BP (!) 152/74   Pulse 68   Resp 20   SpO2 98%         PHYSICAL EXAM:    General Appearance: Alert, patient appears stated age. General:  Obese, patient in no apparent distress. Head/Face: The head is normocephalic and atraumatic. Eyes: Conjunctivae appear normal. Sclera appear normal and non-icteric. Nose (and Sinus):   No abnormality of the nose or sinuses is noted.    Oral:   Throat is clear.   Lymphatics:  No lymphadenopathy in the neck/head. Neck and Thyroid:   No bruits noted in the neck. Respiratory:  Lungs clear to auscultation. Cardiovascular:  Palpation and auscultation: regular rate and rhythm. Extremity: No joint swelling, erythema or pedal edema. NEUROLOGICAL EXAM:    Appearance: The patient is obese, provides a coherent history and is in no acute distress. Mental Status: Oriented to time, place and person. Fluent, no aphasia or dysarthria. Mood and affect appropriate. Cranial Nerves:   Intact visual fields. MEME, EOM's full, no nystagmus, no ptosis. Facial sensation is normal. Corneal reflexes are intact. Facial movement is symmetric. Hearing is normal bilaterally. Palate is midline with normal elevation. Sternocleidomastoid and trapezius muscles are normal. Tongue is midline. Motor:  5/5 strength in upper and lower proximal and distal muscles. Normal bulk and tone. No fasciculations. No pronator drift. Reflexes:   Deep tendon reflexes 1/4 and symmetrical. Downgoing toes. Sensory:   Normal to PP. Decrease cold in soles. Profound decrease in vibratory sensation. .   Gait:  Antalgic but steady. No Romberg. Problems tandem walking. Tremor:   No tremor noted. Cerebellar:  Intact FTN/AVANI/HTS. Neurovascular:  Normal heart sounds and regular rhythm, peripheral pulses intact, and no carotid bruits. Lab Review      Assessment:   Feet paresthesia  Hand paresthesia  TIA    Plan:   Neurological examination reveals decrease cold sensation in the soles but more profound loss of vibratory sensation causing problems with tandem walking. Need to assess for a neuropathy. EMG/NCS of BLE was ordered to further assess. May need to do additional laboratory workup if neuropathy is confirmed. Fall precautions. Exam reveals bilateral Tinel's at the wrist. Need to assess for entrapment neuropathy. EMG/NCS of BUE was ordered to further assess.  Further intervention will be done pending results. History of TIA. Continue Plavix 75 mg daily for stroke prophylaxis. This will be a life time need. Call 911 if new deficits occur. Strict compliance with dietary modifications and medications for HTN and hyperlipidemia. All questions and concerns were answered.

## 2021-02-17 NOTE — PATIENT INSTRUCTIONS
Numbness and Tingling: Care Instructions  Your Care Instructions     Many things can cause numbness or tingling. Swelling may put pressure on a nerve. This could cause you to lose feeling or have a pins-and-needles sensation on part of your body. Nerves may be damaged from trauma, toxins, or diseases, such as diabetes or multiple sclerosis (MS). Sometimes, though, the cause is not clear. If there is no clear reason for your symptoms, and you are not having any other symptoms, your doctor may suggest watching and waiting for a while to see if the numbness or tingling goes away on its own. Your doctor may want you to have blood or nerve tests to find the cause of your symptoms. Follow-up care is a key part of your treatment and safety. Be sure to make and go to all appointments, and call your doctor if you are having problems. It's also a good idea to know your test results and keep a list of the medicines you take. How can you care for yourself at home? · If your doctor prescribes medicine, take it exactly as directed. Call your doctor if you think you are having a problem with your medicine. · If you have any swelling, put ice or a cold pack on the area for 10 to 20 minutes at a time. Put a thin cloth between the ice and your skin. When should you call for help? Call 911 anytime you think you may need emergency care. For example, call if:    · You have weakness, numbness, or tingling in both legs.     · You lose bowel or bladder control.     · You have symptoms of a stroke. These may include:  ? Sudden numbness, tingling, weakness, or loss of movement in your face, arm, or leg, especially on only one side of your body. ? Sudden vision changes. ? Sudden trouble speaking. ? Sudden confusion or trouble understanding simple statements. ? Sudden problems with walking or balance. ? A sudden, severe headache that is different from past headaches.    Watch closely for changes in your health, and be sure to contact your doctor if you have any problems, or if:    · You do not get better as expected. Where can you learn more? Go to http://www.Viewpost.com/  Enter U128 in the search box to learn more about \"Numbness and Tingling: Care Instructions. \"  Current as of: November 20, 2019               Content Version: 12.6  © 7722-3093 [employer]. Care instructions adapted under license by [employer] (which disclaims liability or warranty for this information). If you have questions about a medical condition or this instruction, always ask your healthcare professional. [place] [address] any warranty or liability for your use of this information. Transient Ischemic Attack: Care Instructions  Your Care Instructions     A transient ischemic attack (TIA) is when blood flow to a part of your brain is blocked for a short time. A TIA is like a stroke but usually lasts only a few minutes. A TIA does not cause lasting brain damage. Any vision problems, slurred speech, or other symptoms usually go away in 10 to 20 minutes. But they may last for up to 24 hours. TIAs are often warning signs of a stroke. Some people who have a TIA may have a stroke in the future. A stroke can cause symptoms like those of a TIA. But a stroke causes lasting damage to your brain. You can take steps to help prevent a stroke. One thing you can do is get early treatment. If you have other new symptoms, or if your symptoms do not get better, go back to the emergency room or call your doctor right away. Getting treatment right away may prevent long-term brain damage caused by a stroke. The doctor has checked you carefully, but problems can develop later. If you notice any problems or new symptoms, get medical treatment right away. Follow-up care is a key part of your treatment and safety. Be sure to make and go to all appointments, and call your doctor if you are having problems.  It's also a good idea to know your test results and keep a list of the medicines you take. How can you care for yourself at home? Medicines    · Be safe with medicines. Take your medicines exactly as prescribed. Call your doctor if you think you are having a problem with your medicine.     · If you take a blood thinner, such as aspirin, be sure you get instructions about how to take your medicine safely. Blood thinners can cause serious bleeding problems.     · Call your doctor if you are not able to take your medicines for any reason.     · Do not take any over-the-counter medicines or herbal products without talking to your doctor first.     · If you take birth control pills or hormone therapy, talk to your doctor. Ask if these treatments are right for you. Lifestyle changes    · Do not smoke. If you need help quitting, talk to your doctor about stop-smoking programs and medicines.     · Be active. If your doctor recommends it, get more exercise. Walking is a good choice. Bit by bit, increase the amount you walk every day. Try for at least 30 minutes on most days of the week. You also may want to swim, bike, or do other activities.     · Eat heart-healthy foods. These include fruits, vegetables, high-fiber foods, lean meats, beans, peas, nuts, seeds, and soy products, and foods that are low in sodium, saturated fat, and trans fat.     · Stay at a healthy weight. Lose weight if you need to.     · Limit alcohol to 2 drinks a day for men and 1 drink a day for women. Staying healthy    · Manage other health problems such as diabetes, high blood pressure, and high cholesterol.     · Get the flu vaccine every year. When should you call for help? Call 911 anytime you think you may need emergency care. For example, call if:    · You have new or worse symptoms of a stroke.  These may include:  ? Sudden numbness, tingling, weakness, or loss of movement in your face, arm, or leg, especially on only one side of your body.  ? Sudden vision changes. ? Sudden trouble speaking. ? Sudden confusion or trouble understanding simple statements. ? Sudden problems with walking or balance. ? A sudden, severe headache that is different from past headaches. Call 911 even if these symptoms go away in a few minutes.     · You feel like you are having another TIA. Watch closely for changes in your health, and be sure to contact your doctor if you have any problems. Where can you learn more? Go to http://www.fitzpatrick.com/  Enter I231 in the search box to learn more about \"Transient Ischemic Attack: Care Instructions. \"  Current as of: March 4, 2020               Content Version: 12.6  © 2264-2432 [employer]. Care instructions adapted under license by [employer] (which disclaims liability or warranty for this information). If you have questions about a medical condition or this instruction, always ask your healthcare professional. [place] [address] any warranty or liability for your use of this information.

## 2021-02-23 ENCOUNTER — HOSPITAL ENCOUNTER (OUTPATIENT)
Dept: PHYSICAL THERAPY | Age: 71
Discharge: HOME | End: 2021-02-23
Payer: COMMERCIAL

## 2021-02-23 PROCEDURE — 97161 PT EVAL LOW COMPLEX 20 MIN: CPT

## 2021-02-23 PROCEDURE — 97530 THERAPEUTIC ACTIVITIES: CPT

## 2021-02-23 NOTE — PROGRESS NOTES
[facility]  Lymphedema Clinic and Cancer Rehabilitation  [address] [place] [address].  [address]  [address]      INITIAL EVALUATION    NAME: Laura Omer AGE: 72 y.o. GENDER: male  DATE: 02/23/21  REFERRING PHYSICIAN: Dia Rodrigues MD (cardiology)  HISTORY AND BACKGROUND:  Pt is a 71 yo male returning today for 6 month re-evaluation of bilateral LE secondary lymphedema. Pt arrives to [facility] wearing Juzo 3512 knee high stockings, which he reports wearing every day from morning until evening. Pt says he is able to don/doff stockings independently using medi donning cerna and dycem. Pt denies changes to medical history since last visit. From prior evaluation: Pt reports 20+ year history of fluctuating LE edema, mostly in the foot and ankle, exacerbated by varicose veins in the last 3 years. Pt with history of B TKA and neuroma on right foot. Previous doppler negative for DVT. Primary Diagnosis:  · BLE lymphedema, secondary (I89.0)  Other Treatment Diagnoses:   Swelling not relieved by elevation (R60.0 localized edema,)   Venous insufficiency I87.2;   Date of Onset: reports a 20+ year history of fluctuating LE edema, mostly in the foot and ankle. In the past few years exacerbated by varicose issues  Present Symptoms and Functional Limitations: LE swelling has fluctuations and difficulty with wearing shoes. Reports improvement since initiating stocking wear in July 2020. Lower Extremity Functional Scale: 15/68, 22% impairment     Past Medical History:   Past Medical History:   Diagnosis Date    Benign prostatic hyperplasia 1/15/2021    Blood glucose abnormal 1/15/2021    Essential hypertension 1/15/2021    Hypogonadism in male 1/15/2021    Mixed hyperlipidemia 1/15/2021    Other specified hypothyroidism 1/15/2021    Palpitations     Prediabetes 1/15/2021    Rosacea 1/15/2021    TIA (transient ischemic attack)     Per patient. Negative imgaing. Past Surgical History:   Procedure Laterality Date    HX CIRCUMCISION  2001    HX COLONOSCOPY  07/13/2018    2 adenomatous polyps removed -Dr. John Collins HX COLONOSCOPY  01/01/2012    HX CYST REMOVAL  1963    pilonidal cyst surgery    HX HERNIA REPAIR      HX KNEE REPLACEMENT Bilateral     HX ORTHOPAEDIC      Neuroma on foot removed     HX VASECTOMY      HX VEIN STRIPPING  12/2008    Dr. Donny Ratliff     Current Medications:    Current Outpatient Medications   Medication Sig    ergocalciferol (ERGOCALCIFEROL) 1,250 mcg (50,000 unit) capsule Take 1 Cap by mouth every seven (7) days.  [START ON 4/1/2021] cholecalciferol (VITAMIN D3) (1000 Units /25 mcg) tablet Take 2 Tabs by mouth daily.  atorvastatin (Lipitor) 20 mg tablet Take 1 Tab by mouth daily.  tamsulosin (FLOMAX) 0.4 mg capsule Take 1 Cap by mouth daily.  benazepril-hydroCHLOROthiazide (LOTENSIN HCT) 10-12.5 mg per tablet TAKE 1 TABLET DAILY FOR    BLOOD PRESSURE    Xyosted 75 mg/0.5 mL atIn INJECT 0.5ML INTO THE SKIN ONCE A WEEK    latanoprost (XALATAN) 0.005 % ophthalmic solution Administer 1 Drop to both eyes nightly.  clopidogrel (PLAVIX) 75 mg tab Take 75 mg by mouth daily.  levothyroxine (SYNTHROID) 100 mcg tablet Take 100 mcg by mouth Daily (before breakfast).  minocycline (MINOCIN, DYNACIN) 100 mg capsule Take 100 mg by mouth daily.  tadalafil (CIALIS) 5 mg tablet Take 5 mg by mouth daily. No current facility-administered medications for this encounter. Allergies:    Allergies   Allergen Reactions    Zocor [Simvastatin] Myalgia      Prior Level of Function/Social/Work History/Personal factors and/or comorbidities impacting plan of care: still working as a special  for [employer], active with yard work and grandkids   Living Situation: home      Trainable Caregiver?: wife   Self-care/ADLs: independent     Mobility: independent   Sleeping Arrangement:  In a bed   Adaptive Equipment Owned: none   Other:   Previous Therapy:  Seen at Lymphedema clinic in July 2020. Compression/Lymphedema Equipment:  iQVCloud 3512 knee high stockings, size III regular, closed toe (2 pair). SUBJECTIVE:   Patient reports good tolerance to knee high stockings and feels swelling is stable. Patients goals for therapy: recheck volumes and obtain new compression garments. OBJECTIVE DATA SUMMARY:   EXAMINATION/PRESENTATION/DECISION MAKING:   Pain:   0/10        Self-Care and ADLs:  Grooming: Independent  Bathing: Independent    UB Dressing: Independent  LB Dressing: Independent    Don/Doff Shoes/Socks: Independent  Toileting: Independent    Other:       Skin and Tissue Assessment:  Dermal Status:  [x]   Intact []  Dry   []  Tenuous [] Flaky   []  Wound/lesion present [x]  Scars: B TKA   []  Dermatitis    Texture/Consistency:  []  Boggy []  Pitting Edema   []  Brawny []  Combination   []  Fibrotic/Woody    Pigmentation/Color Change:  []  Normal []  Hemosiderin   []  Red []  Erythematous   [x]  Hyperpigmented: around feet/ankles  []  Hyperlipodermatosclerosis   Anomalies:  []  Lymphorrhea []  Vesicles   []  Petechiae []  Warty Vercusis   []  Bullae []  Papilloma   Circulatory:  []  XENA [x]  Varicosities: varicous veins present   [x]  Pulses palpable  [x]  Vascular studies ruled out DVT in past   []  DVT History    Nails:  [x]   Normal  []   Fungus  Stemmers Sign: negative  Vitals  Blood pressure  128/68  Heart rate (bpm)  72  Oxygen saturation 98%  Height:   5'9\" Weight:   223 lbs  BMI:   32.9  (36 or greater: adversely affecting lymphedema)  Wound/Ulcer:  no      Wound Pain:   none  Volumetric Measurements:   Today 2/23/2021  Right:  3867. 14 mL Left:  3056.21 mL   % Difference: 2.89 Dominance: Right   (See scanned graph)    7/22/2020:  Right:  3535.05 mL Left:  3420.34 mL   % Difference: 3.16 Dominance: Right   (See scanned graph)  Range of Motion: [facility]  Strength: WFL  Sensation: Intact  Mobility:  Bed/Chair Mobility:  Independent  Transfers:  Independent    Sitting Balance:  intact Standing Balance:  intact   Gait:  Independent  Wheelchair Mobility:  n/a   Endurance:  good Stairs:  Independent          Safety:  Patient is alert and oriented:  x4   Safety awareness:  yes   Fall Risk?:  no   Patient given written fall prevention handout: Yes   Precautions:  Standard lymphedema precautions to include avoiding blood pressure readings, injections and IVs or other procedures/acts that could lead to broken skin on affected area, and avoiding excessive heat, resistive activity or altitude without compression garment    Functional Measure:   LLIS: 15/68,  22% impairment    Physical Therapy Evaluation Charge Determination   History Examination Presentation Decision-Making   MEDIUM  Complexity : 1-2 comorbidities / personal factors will impact the outcome/ POC  LOW Complexity : 1-2 Standardized tests and measures addressing body structure, function, activity limitation and / or participation in recreation  LOW Complexity : Stable, uncomplicated  Other outcome measures LLIS  LOW       Based on the above components, the patient evaluation is determined to be of the following complexity level: LOW     Evaluation Time: 2:05 - 2:20 pm (15 minutes)    TREATMENT PROVIDED:   1. Treatment description:  Therapeutic activity x 1  The patient was re-educated regarding the role and function of the lymphatic system, pathophysiology of lymphedema/lipedema, and instructed in the lymphedema management protocol of complete decongestive therapy (CDT). This includes skin care to prevent breakdown or infection, lymphedema exercises, custom compression therapy options (bandaging, compression garments, compression pump, Blenda Sergey, JoViPak, The Kareem-Oswaldo, etc. as needed), and decongestion with manual lymphatic drainage as indicated. We discussed the need for consistent compression system for lymphedema management. Diaphragmatic breathing instruction and skin care education was performed, applying low pH lotion to extremity using upward strokes to stimulate lymphatic vessels. Patient with combination edema complicated by vascular insufficiency. Reviewed limb volumes as above and garment measurements. Patient measured for replacement Juzo 3512 CCL2 knee high, beige, closed toe stockings for bilateral LE, size III regular. DME sent to vendor. Explained to pt that this may take several weeks to receive stockings due to need for signed eval/LMN from MD and then obtaining insurance authorization. Pt to fit garments on his own and contact clinic with any issues prior to next 6 month re-evaluation. Treatment time:  2:20 - 2:40 pm  Minutes: 20    ASSESSMENT:   Rosalie Nelson is a 72 y.o. male who presents with Stage II combination edema with venous insufficiency. Patient's limb volumes have reduced since transitioning to Juzo dynamic stockings in July 2020. Patient's leg discrepancy 2.89% and able to be measured for replacement RM garments, Juzo 3512 size III regular, closed toe. DME order sent to vendor. Explained to pt that this may take several weeks to receive stockings due to need for signed eval/LMN from MD and then obtaining insurance authorization. Pt to fit garments on his own since no change in size/style indicated and return to clinic in 6 months for re-evaluation. Patient continues to benefit from aspects of complete decongestive therapy (CDT) including manual lymphatic drainage (MLD) technique, short-stretch compression system to maintain limb volumes, and kinesiotaping as appropriate. Patient will receive instruction in proper skin care to recognize signs/symptoms of and prevent infection, therapeutic exercise, and self-MLD for independent home program and restorative lymphatic performance. This care is medically necessary due to the infection risk with lymphedema, and to improve functional activities.   CDT is necessary to resolve swelling to allow patient to return to wearing normal clothes/footwear, and prevent worsening of symptoms, such as venous stasis ulcerations, infections, or hospitalizations. Patient will be independent with home program strategies to allow improved ADL ability and mobility and to allow patient to return to greatest functional independence. PLAN OF CARE:   Recommendations and Planned Interventions:  No f/u visits indicated. Pt to return in 6 months for re-evaluation or sooner as needed. Patient has participated in goal setting and agrees to work toward plan of care. Patient was instructed to call if questions or concerns arise. Thank you for this referral.  Drew Hahn. Sera, PT, DPT, CLT-OTTONIEL    Time Calculation: 35 mins    TREATMENT PLAN EFFECTIVE DATES:   2/23/2021 TO 5/18/2021  I have read the above plan of care for [address] [facility] DESTINEE WELLS. I certify the above prescribed services are required by this patient and are medically necessary.   The above plan of care has been developed in conjunction with the lymphedema/physical therapist.       Physician Signature: ____________________________________Date:______________

## 2021-03-02 ENCOUNTER — OFFICE VISIT (OUTPATIENT)
Dept: NEUROLOGY | Age: 71
End: 2021-03-02
Payer: COMMERCIAL

## 2021-03-02 DIAGNOSIS — M54.16 LUMBAR RADICULOPATHY: Primary | ICD-10-CM

## 2021-03-02 PROCEDURE — 95886 MUSC TEST DONE W/N TEST COMP: CPT | Performed by: PSYCHIATRY & NEUROLOGY

## 2021-03-02 PROCEDURE — 95911 NRV CNDJ TEST 9-10 STUDIES: CPT | Performed by: PSYCHIATRY & NEUROLOGY

## 2021-03-02 NOTE — PROCEDURES
EMG/ NCS Report  DRUG REHABILITATION  - DAY ONE RESIDENCE  [facility]  [facility], [address] [place] [address]   [address]   Ph: [phone]   FAX: [phone]  Test Date:  2019      Test Date:  3/2/2021    Patient: Paula Calderon : 1950 Physician: Beau Sanchez MD   ID#: [id number] SEX: Male Ref. Phys: Beau Sanchez MD   Tech: Scarlet Metzok    Patient History / Exam:  Patient is complaining of coldness in the feet for 2 years with bluish discoloration. Exam revealed decrease cold in the soles and vibration with antalgic gait. Assess for neuropathy vs lumbar radiculopathy. EMG & NCV Findings:  Sensory and motor nerve conduction studies (as indicated in the tables) were within reference of normal.  All F Wave latencies were within normal limits. All F Wave left vs. right side latency differences were within normal limits. All H Reflex left vs. right side latency differences were within normal limits. Disposable concentric needle EMG (as indicated in the table) revealed mild denervation changes left tibialis anterior, peroneus longus and TFL muscles. Mild denervation changes with complex repetitive discharges L>R mid to lower lumbar paraspinal muscles. Impressions: This study is normal.  There is electrodiagnostic evidence of.a mild chronic left (at least L5) greater than right lumbar radiculopathy. There is no evidence of a generalized neuropathy or myopathy at this time. Recommend lumbar MRI correlate.      Aurora Martinez MD    Nerve Conduction Studies  Anti Sensory Summary Table     Stim Site NR Peak (ms) Norm Peak (ms) P-T Amp (µV) Norm P-T Amp Site1 Site2 Dist (cm)   Left Sup Fibular Anti Sensory (Lat ankle)  33.2°C   Lower leg    2.7 <4.6 12.7 >4 Lower leg Lat ankle 10.0   Site 2    2.8  13.1       Site 3    2.8  13.2       Right Sup Fibular Anti Sensory (Lat ankle)  32°C   Lower leg    2.8 <4.6 6.4 >4 Lower leg Lat ankle 10.0   Site 2    2.8  7.5 Site 3    2.7  5.8       Left Sural Anti Sensory (Lat Mall)  33.1°C   Calf    3.6 <4.5 5.0 >4.0 Calf Lat Mall 14.0   Site 2    3.5  3.3       Site 3    3.5  2.1       Right Sural Anti Sensory (Lat Mall)  31.8°C   Calf    3.5 <4.5 4.2 >4.0 Calf Lat Mall 14.0   Site 2    3.5  5.5       Site 3    3.4  2.0         Motor Summary Table     Stim Site NR Onset (ms) Norm Onset (ms) O-P Amp (mV) Norm O-P Amp Amp (Prev) (%) Site1 Site2 Dist (cm) Jose Carlos (m/s) Norm Jose Carlos (m/s)   Left Fibular Motor (Ext Dig Brev)  33.2°C   Ankle    4.1 <6.5 3.1 >1.1 100.0 Ankle Ext Dig Brev 8.0     B Fib    11.6  2.6  83.9 B Fib Ankle 33.0 44 >38   Poplt    13.2  2.5  96.2 Poplt B Fib 10.0 63 >42   Right Fibular Motor (Ext Dig Brev)  32.2°C   Ankle    4.3 <6.5 4.2 >1.1 100.0 Ankle Ext Dig Brev 8.0     B Fib    11.1  3.2  76.2 B Fib Ankle 31.0 46 >38   Poplt    12.6  3.8  118.7 Poplt B Fib 10.0 67 >42   Left Tibial Motor (Abd Ferrell Brev)  33.3°C   Ankle    4.4 <6.1 9.3 >1.1 100.0 Ankle Abd Ferrell Brev 8.0     Knee    13.0  9.2  98.9 Knee Ankle 36.0 42 >39   Right Tibial Motor (Abd Ferrell Brev)  32.3°C   Ankle    4.1 <6.1 7.9 >1.1 100.0 Ankle Abd Ferrell Brev 8.0     Knee    12.7  7.8  98.7 Knee Ankle 37.0 43 >39     F Wave Studies     NR F-Lat (ms) Lat Norm (ms) L-R F-Lat (ms) L-R Lat Norm   Left Tibial (Mrkrs) (Abd Hallucis)  33.2°C      52.66 <56 0.23 <5.7   Right Tibial (Mrkrs) (Abd Hallucis)  32.4°C      52.43 <56 0.23 <5.7     H Reflex Studies     NR H-Lat (ms) L-R H-Lat (ms) L-R Lat Norm   Left Tibial (Gastroc)  29°C      32.70 0.22 <2.0   Right Tibial (Gastroc)  32.4°C      32.48 0.22 <2.0       EMG     Side Muscle Nerve Root Ins Act Fibs Psw Recrt Duration Amp Poly Comment   Left VastusLat Femoral L2-4 Nml Nml Nml Nml Nml Nml Nml    Left MedGastroc Tibial S1-2 Nml Nml Nml Nml Nml Nml Nml    Left AntTibialis Dp Br Peron L4-5 Incr Nml 1+ Nml Nml Nml Nml    Left Peroneus Long   Incr Nml 1+ Nml Nml Nml Nml    Left TensorFascLat SupGluteal L4-5, S1 Incr Nml 1+ Nml Nml Nml Nml    Left Lower Lumb Parasp Rami L5,S1 Incr 1+ 1+ Nml Nml Nml Nml CRD   Left Mid Lumb Parasp Rami L4,5 Incr 1+ 1+ Nml Nml Nml Nml CRD   Right VastusLat Femoral L2-4 Nml Nml Nml Nml Nml Nml Nml    Right MedGastroc Tibial S1-2 Nml Nml Nml Nml Nml Nml Nml    Right AntTibialis Dp Br Peron L4-5 Nml Nml Nml Nml Nml Nml Nml    Right Peroneus Long   Nml Nml Nml Nml Nml Nml Nml    Right TensorFascLat SupGluteal L4-5, S1 Nml Nml Nml Nml Nml Nml Nml    Right Lower Lumb Parasp Rami L5,S1 Incr Nml 1+ Nml Nml Nml Nml CRD   Right Mid Lumb Parasp Rami L4,5 Incr Nml 1+ Nml Nml Nml Nml CRD     Waveforms:

## 2021-03-03 ENCOUNTER — OFFICE VISIT (OUTPATIENT)
Dept: NEUROLOGY | Age: 71
End: 2021-03-03
Payer: COMMERCIAL

## 2021-03-03 VITALS — TEMPERATURE: 96.8 F

## 2021-03-03 DIAGNOSIS — M54.12 CERVICAL RADICULOPATHY: Primary | ICD-10-CM

## 2021-03-03 DIAGNOSIS — M54.16 LUMBAR RADICULOPATHY: ICD-10-CM

## 2021-03-03 PROCEDURE — 95911 NRV CNDJ TEST 9-10 STUDIES: CPT | Performed by: PSYCHIATRY & NEUROLOGY

## 2021-03-03 PROCEDURE — 95886 MUSC TEST DONE W/N TEST COMP: CPT | Performed by: PSYCHIATRY & NEUROLOGY

## 2021-03-03 NOTE — PROCEDURES
EMG/ NCS Report  DRUG REHABILITATION  - DAY ONE [facility]IDENCE  [address] [facility], [address] [place] [address]   [address]   Ph: [phone]   FAX: [phone]  Test Date:  2019      Test Date:  3/3/2021    Patient: Mustapha Robb : 1950 Physician: Hussain Gill MD   ID#: [id number] SEX: Male Ref. Phys: Hussain Gill MD   Tech: Annette Broderick    Patient History / Exam:  Patient complaining of coldness fingertips x 2 year and past 6 months of intermittent numbness/tingling bilateral arms and hands. Patient with some chronic neck pain. Exam in the upper extremity was non-focal. Assess for neuropathy vs cervical radiculopathy. EMG & NCV Findings:  Sensory and motor nerve conduction studies (as indicated in the tables) were within reference of normal.  All F Wave latencies were within normal limits. All F Wave left vs. right side latency differences were within normal limits. Disposable concentric needle EMG (as indicated in the table) revealed mild denervation changes R>L mid to lower cervical paraspinal muscles and more chronic on the right with complex repetitive discharges. Impressions: This study is abnormal. There is electrodiagnostic evidence of a R>L mild mid to lower cervical radiculopathy. There is no evidence of an entrapment neuropathy, generalized neuropathy or myopathy at this time. Cervical MRI correlate was ordered.     Mel Thompson MD    Nerve Conduction Studies  Anti Sensory Summary Table     Stim Site NR Peak (ms) Norm Peak (ms) P-T Amp (µV) Norm P-T Amp Site1 Site2 Dist (cm)   Left Median Anti Sensory (2nd Digit)  32.6°C   Wrist    3.1 <4 31.2 >13 Wrist 2nd Digit 14.0   Elbow    3.1  33.0  Elbow Wrist 0.0   Right Median Anti Sensory (2nd Digit)   Wrist    3.1 <4 23.2 >13 Wrist 2nd Digit 14.0   Elbow    3.1  25.2  Elbow Wrist 0.0   Left Radial Anti Sensory (Base 1st Digit)  32.8°C   Wrist    2.3 <2.8 24.5 >11 Wrist Base 1st Digit 10.0   Site 2 2. 3  28.3       Right Radial Anti Sensory (Base 1st Digit)   Wrist    2.0 <2.8 27.9 >11 Wrist Base 1st Digit 10.0   Site 2    2.0  25.5       Left Ulnar Anti Sensory (5th Digit)  32.7°C   Wrist    3.4 <4.0 20.4 >9 Wrist 5th Digit 14.0   B Elbow    3.5  18.0  B Elbow Wrist 0.0   Right Ulnar Anti Sensory (5th Digit)   Wrist    2.8 <4.0 9.8 >9 Wrist 5th Digit 14.0   B Elbow    2.7  14.8  B Elbow Wrist 0.0     Motor Summary Table     Stim Site NR Onset (ms) Norm Onset (ms) O-P Amp (mV) Norm O-P Amp Amp (Prev) (%) Site1 Site2 Dist (cm) Jose Carlos (m/s) Norm Jose Carlos (m/s)   Left Median Motor (Abd Poll Brev)  32.9°C   Wrist    4.0 <4.5 7.1 >4.1 100.0 Wrist Abd Poll Brev 8.0     Elbow    7.9  6.5  91.5 Elbow Wrist 19.5 50 >49   Right Median Motor (Abd Poll Brev)   Wrist    4.1 <4.5 6.7 >4.1 100.0 Wrist Abd Poll Brev 8.0     Elbow    8.2  6.0  89.6 Elbow Wrist 20.0 49 >49   Left Ulnar Motor (Abd Dig Minimi)  32.8°C   Wrist    2.7 <3.1 7.4 >7.0 100.0 Wrist Abd Dig Minimi 8.0  >50   B Elbow    6.5  6.8  91.9 B Elbow Wrist 23.0 61 >50   A Elbow    8.2  7.1  104.4 A Elbow B Elbow 10.0 59 >50   Right Ulnar Motor (Abd Dig Minimi)   Wrist    3.0 <3.1 7.9 >7.0 100.0 Wrist Abd Dig Minimi 8.0  >50   B Elbow    7.1  7.3  92.4 B Elbow Wrist 21.0 51 >50   A Elbow    9.1  7.3  100.0 A Elbow B Elbow 10.0 50 >50     Comparison Summary Table     Stim Site NR Peak (ms) P-T Amp (µV) Site1 Site2 Dist (cm) Delta-0 (ms)   Left Median/Ulnar Palm Comparison (Wrist)  32.8°C   Median Palm    1.9 35.3 Median Palm Ulnar Palm 8.0 0.2   Ulnar Palm    1.8 13.4       Right Median/Ulnar Palm Comparison (Wrist)   Median Palm    1.9 43.7 Median Palm Ulnar Palm 8.0 0.1   Ulnar Palm    1.8 12.5         F Wave Studies     NR F-Lat (ms) Lat Norm (ms) L-R F-Lat (ms) L-R Lat Norm   Left Ulnar (Mrkrs) (Abd Dig Min)  32.8°C      28.44 <32 1.35 <2.5   Right Ulnar (Mrkrs) (Abd Dig Min)      29.79 <32 1.35 <2.5       EMG     Side Muscle Nerve Root Ins Act Fibs Psw Recrt Duration Amp Poly Comment   Left Deltoid Axillary C5-6 Nml Nml Nml Nml Nml Nml Nml    Left Triceps Radial C6-7-8 Nml Nml Nml Nml Nml Nml Nml    Left Biceps Musculocut C5-6 Nml Nml Nml Nml Nml Nml Nml    Left PronatorTeres Median C6-7 Nml Nml Nml Nml Nml Nml Nml    Left 1stDorInt Ulnar C8-T1 Nml Nml Nml Nml Nml Nml Nml    Left Lower Cerv Parasp Rami C7,T1 Incr Nml 1+ Nml Nml Nml Nml    Left Mid Cerv Parasp Rami C5,6 Incr Nml 1+ Nml Nml Nml Nml    Right Deltoid Axillary C5-6 Nml Nml Nml Nml Nml Nml Nml    Right Triceps Radial C6-7-8 Nml Nml Nml Nml Nml Nml Nml    Right Biceps Musculocut C5-6 Nml Nml Nml Nml Nml Nml Nml    Right 1stDorInt Ulnar C8-T1 Nml Nml Nml Nml Nml Nml Nml    Right PronatorTeres Median C6-7 Nml Nml Nml Nml Nml Nml Nml    Right Lower Cerv Parasp Rami C7,T1 Incr 1+ 1+ Nml Nml Nml Nml CRD   Right Mid Cerv Parasp Rami C5,6 Incr 1+ 1+ Nml Nml Nml Nml CRD     Waveforms:

## 2021-03-12 ENCOUNTER — TELEPHONE (OUTPATIENT)
Dept: NEUROLOGY | Age: 71
End: 2021-03-12

## 2021-03-15 DIAGNOSIS — M54.12 CERVICAL RADICULOPATHY: ICD-10-CM

## 2021-03-15 DIAGNOSIS — M54.16 LUMBAR RADICULOPATHY: Primary | ICD-10-CM

## 2021-03-15 NOTE — TELEPHONE ENCOUNTER
Please call patient and inform him that his MRI's were not approved by his insurance and we need to go through the process of obtaining cervical and lumbar x-ray first and referring him to PT for his neck and back and see how he does. Which is appropriate at this time. I have ordered the x-rays and he can have it done either here at [place] or at [facility]. I have ordered PT for him here at [place].

## 2021-03-16 ENCOUNTER — HOSPITAL ENCOUNTER (OUTPATIENT)
Dept: GENERAL RADIOLOGY | Age: 71
Discharge: HOME | End: 2021-03-16
Payer: COMMERCIAL

## 2021-03-16 DIAGNOSIS — M54.16 LUMBAR RADICULOPATHY: ICD-10-CM

## 2021-03-16 DIAGNOSIS — M54.12 CERVICAL RADICULOPATHY: ICD-10-CM

## 2021-03-16 PROCEDURE — 72100 X-RAY EXAM L-S SPINE 2/3 VWS: CPT

## 2021-03-16 PROCEDURE — 72050 X-RAY EXAM NECK SPINE 4/5VWS: CPT

## 2021-03-16 NOTE — TELEPHONE ENCOUNTER
returned his call. Gave him instructions on xrays and PT order that was put in. They will reach out to him for therapy. He will go by this afternoon and get the xrays completed.

## 2021-03-18 NOTE — PROGRESS NOTES
Lumbar x-ray revealed multi-level degenerative changes. Plan is conservative management with physical therapy and as needed aleve or motrin.

## 2021-03-18 NOTE — PROGRESS NOTES
Cervical x-ray revealed mild degenerative changes from C5 to C7. Preserved vertebral body heights. Conservative management for now with therapy and over the counter Aleve or Motrin as needed.

## 2021-03-21 PROBLEM — M50.30 DDD (DEGENERATIVE DISC DISEASE), CERVICAL: Status: ACTIVE | Noted: 2021-03-21

## 2021-03-21 PROBLEM — M47.26 OTHER SPONDYLOSIS WITH RADICULOPATHY, LUMBAR REGION: Status: ACTIVE | Noted: 2021-03-21

## 2021-03-21 PROBLEM — M47.22 CERVICAL SPONDYLOSIS WITH RADICULOPATHY: Status: ACTIVE | Noted: 2021-03-21

## 2021-04-04 RX ORDER — LEVOTHYROXINE SODIUM 100 UG/1
TABLET ORAL
Qty: 90 TAB | Refills: 3 | Status: SHIPPED | OUTPATIENT
Start: 2021-04-04 | End: 2022-06-20

## 2021-04-04 RX ORDER — CLOPIDOGREL BISULFATE 75 MG/1
TABLET ORAL
Qty: 90 TAB | Refills: 3 | Status: SHIPPED | OUTPATIENT
Start: 2021-04-04 | End: 2022-03-09

## 2021-04-04 RX ORDER — LEVOTHYROXINE SODIUM 100 UG/1
TABLET ORAL
Qty: 90 TAB | Refills: 2 | Status: SHIPPED | OUTPATIENT
Start: 2021-04-04 | End: 2021-07-08

## 2021-04-04 RX ORDER — MINOCYCLINE HYDROCHLORIDE 100 MG/1
TABLET ORAL
Qty: 90 TAB | Refills: 1 | Status: SHIPPED | OUTPATIENT
Start: 2021-04-04 | End: 2021-07-08 | Stop reason: SDUPTHER

## 2021-04-06 ENCOUNTER — HOSPITAL ENCOUNTER (OUTPATIENT)
Dept: PHYSICAL THERAPY | Age: 71
Discharge: HOME | End: 2021-04-06
Payer: COMMERCIAL

## 2021-04-06 PROCEDURE — 97162 PT EVAL MOD COMPLEX 30 MIN: CPT | Performed by: PHYSICAL THERAPIST

## 2021-04-06 PROCEDURE — 97110 THERAPEUTIC EXERCISES: CPT | Performed by: PHYSICAL THERAPIST

## 2021-04-06 NOTE — PROGRESS NOTES
Physical Therapy at [facility],   a part of  [facility]  [place]  [facility], [address]  Phone: [phone]  Fax: [phone]    Plan of Care/ Statement of Necessity for Physical Therapy Services 2-15    Patient name: Kory Barraza  : 1950  Provider#: [id number]  Referral source: Zach Coyne.,*      Medical/Treatment Diagnosis: Neck pain [M54.2]  Low back pain [M54.5]     Prior Hospitalization: see medical history     Comorbidities: arthritis, HTN, B TKR 3-4 years ago, obesity, cervical and lumbar DDD  Prior Level of Function: Pt works full time as a teacher, he enjoys carpentry and yard work  Medications: Verified on Patient Summary List    Start of Care: 22      Onset Date: chronic condition, worsening over the last 1-2 years       The Plan of Care and following information is based on the information from the initial evaluation. Assessment/ key information: The patient presents with cervical and lumbar radiculopathy which affects his ability to perform yard work and complete certain teaching tasks. The patient presents with excellent upper and lower body strength, except  strength, which is weak, more notably on the right. The patient has full sensation but does complain of some sensory changes that have worsened over the past year including tingling and 'coldness' in his hands and feet. The patient has limited cervical and lumbar mobility and would benefit from outpatient PT to restore flexibility and improve biomechanics for improved ease with working and teaching tasks.      Evaluation Complexity History HIGH Complexity :3+ comorbidities / personal factors will impact the outcome/ POC ; Examination HIGH Complexity : 4+ Standardized tests and measures addressing body structure, function, activity limitation and / or participation in recreation  ;Presentation MEDIUM Complexity : Evolving with changing characteristics ;Clinical Decision Making MEDIUM Complexity : FOTO score of 26-74  Overall Complexity Rating: MEDIUM    Problem List: pain affecting function, decrease ROM, decrease strength, edema affecting function, impaired gait/ balance, decrease ADL/ functional abilitiies, decrease activity tolerance, decrease flexibility/ joint mobility and decrease transfer abilities   Treatment Plan may include any combination of the following: Therapeutic exercise, Therapeutic activities, Neuromuscular re-education, Physical agent/modality, Gait/balance training, Manual therapy, Patient education, Self Care training, Functional mobility training, Home safety training and Stair training  Patient / Family readiness to learn indicated by: asking questions, trying to perform skills and interest  Persons(s) to be included in education: patient (P)  Barriers to Learning/Limitations: None  Patient Goal (s): find out whats going on and get more flexible  Patient Self Reported Health Status: fair  Rehabilitation Potential: good    Short Term Goals: To be accomplished in 4 weeks:  1) The patient will be independent with introductory HEP  2) The patient will improve lumbar flexion AROM to within 12 inches of floor to improve ease with household chores  3) The patient will improve R cervical rotation AROM to 46 deg to improve ease with driving  Long Term Goals: To be accomplished in 12 weeks:  1) The patient will improve  strength by 5 PSI B to improve ease with yard tasks  2) The patient will report ability to complete a full day of teaching without an increase in numbness/ tingling  3) The patient will report ability to complete ADLs without an increase in numbness/tingling  Frequency / Duration: Patient to be seen 1 times per week for 12 weeks.     Patient/ Caregiver education and instruction: self care, activity modification and exercises    [x]  Plan of care has been reviewed with MELODIE Ellis, PT 4/6/2021 ________________________________________________________________________    I certify that the above Therapy Services are being furnished while the patient is under my care. I agree with the treatment plan and certify that this therapy is necessary.     Physician's Signature:____________________  Date:____________Time: _________      Joseph Longo.,*

## 2021-04-06 NOTE — PROGRESS NOTES
PT INITIAL EVALUATION NOTE - [facility] 2-15    Patient Name: Bartolo Graff  Date:2021  : 1950  [x]  Patient  Verified  Payor: Jayshree Martinez / Plan: Kali Zhou / Product Type: HMO /    In time: 3:25 PM  Out time: 4:25 PM  Total Treatment Time (min): 60  Total Timed Codes (min): 15  1:1 Treatment Time ( only): 15   Visit #: 1     Treatment Area: Neck pain [M54.2]  Low back pain [M54.5]    SUBJECTIVE  Pain Level (0-10 scale): 7/10  At worst 9/10, pain is increased with yard work, building a tree house, bending over a desk  At best 3/10, pain is decreased with tylenol (at night)  Any medication changes, allergies to medications, adverse drug reactions, diagnosis change, or new procedure performed?: [] No    [x] Yes (see summary sheet for update)  Subjective:    Pt c/o coldness in his fingers and toes, \"this began 1-2 years ago\" \"many years before that there was arthritis in the back\"   Pt c/o neck and back pain for 20 years. Pain has worsened over the past 6 months \"my head was leaning forward more than it used to be\"  Pt also has tingling in his fingers and toes. C-spine x-ray: IMPRESSION 3/16/21  No acute bony abnormality of the cervical spine . Mild multilevel degenerative disc disease and spondylosis.    Lumbar spine x-ray: IMPRESSION  No acute bony abnormality of the lumbar spine. Multilevel degenerative change with spondylosis.   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  Mechanism of Injury: Insidious onset  Previous Treatment/Compliance: None for back or neck  PMHx/Surgical Hx: arthritis, HTN, B TKR 3-4 years ago  Work Hx: See above  Living Situation: Pt lives with his spouse, no stairs in the home  Pt Goals: \"determine, flexibility\"  Barriers: chronic nature of condition  Motivation: good  Substance use: none reported  Cognition: A & O x 3        OBJECTIVE/EXAMINATION  Posture:  L shoulder higher  Other Observations:  Blue nails \"more noticeable in the past years\"  Gait and Functional Mobility:  Habitual sit <> stands c BUE assistance but able to complete independently, decreased arm swing R    Lumbar AROM:         R  L    Flexion    16\"        Extension   neutral        Side Bending   24\"  24\"         Rotation   17\"  17\"          Cervical AROM:       R  L    Flexion    26        Extension   35        Side Bending   22  22      Rotation                         42                    50      UPPER QUARTER MUSCLE STRENGTH      R  L  C1, C2 Neck Flex  5   C3 Side Flex   5            4+  C4 Sh Elev   5            5  C5 Deltoid/Biceps  5            5  C6 Wrist Ext   5            5  C7 Triceps   5            5  C8 Thumb Ext   4+            4  T1 Hand Inst              4                     4+   Strength                          47.1#              50#    LOWER QUARTER MUSCLE STRENGTH  KEY    R  L  L1, L2 Psoas   5  5  L3 Quads   5  5  L4 Tib Ant   5  5  L5 EHL   4-  5  S1 FHL   5  5    S2 Hams   5  5         Neurological: Sensation: Decreased sensation noted over L anterior leg   Special Tests:    Shoulder flexion 130 R 140 L   Abduction R 110 R  140 L   Extension- R 55 L 50   ER R-60 L 60     SLR: R 45; L 45   ULTT: negative, testing limited by shoulder ROM        15 min Therapeutic Exercise:  [x] See flow sheet :   Rationale: increase ROM, increase strength and improve coordination to improve the patients ability to sit, stand, transfer, ambulate, lift, carry, reach, complete ADLs    With   [x] TE   [] TA   [] Neuro   [] SC   [] other: Patient Education: [x] Review HEP    [] Progressed/Changed HEP based on:   [x] positioning   [x] body mechanics   [] transfers   [x] heat/ice application    [] other:      Other Objective/Functional Measures: FOTO Functional Measure: 48/100                    Pain Level (0-10 scale) post treatment: 0    ASSESSMENT/Changes in Function:     [x]  See Plan of [address] [place] [address], PT 4/6/2021

## 2021-04-13 DIAGNOSIS — E55.9 VITAMIN D DEFICIENCY: Primary | ICD-10-CM

## 2021-04-14 ENCOUNTER — HOSPITAL ENCOUNTER (OUTPATIENT)
Dept: PHYSICAL THERAPY | Age: 71
Discharge: HOME | End: 2021-04-14
Payer: COMMERCIAL

## 2021-04-14 LAB — 25(OH)D3+25(OH)D2 SERPL-MCNC: 28 NG/ML (ref 30–100)

## 2021-04-14 PROCEDURE — 97112 NEUROMUSCULAR REEDUCATION: CPT | Performed by: PHYSICAL MEDICINE & REHABILITATION

## 2021-04-14 PROCEDURE — 97110 THERAPEUTIC EXERCISES: CPT | Performed by: PHYSICAL MEDICINE & REHABILITATION

## 2021-04-14 NOTE — PROGRESS NOTES
PT DAILY TREATMENT NOTE - [facility] 2-15    Patient Name: Jia Montano  Date:2021  : 1950  [x]  Patient  Verified  Payor: Lester Dodd / Plan: Clarita Dears / Product Type: HMO /    In time:445  Out time:530  Total Treatment Time (min): 45  Total Timed Codes (min): 45  1:1 Treatment Time ( [address] only):    Visit #: 2      Treatment Area: Neck pain [M54.2]  Low back pain [M54.5]    SUBJECTIVE  Pain Level (0-10 scale): 3/10  Any medication changes, allergies to medications, adverse drug reactions, diagnosis change, or new procedure performed?: [x] No    [] Yes (see summary sheet for update)  Subjective functional status/changes:   [] No changes reported  Pt says neck feels more loose but back is painful most of the time. Pt also notes some hip flexion HEP aggravates an old hernia he forgot to mention last visit. OBJECTIVE    15 min Therapeutic Exercise:  [x] See flow sheet :   Rationale: increase ROM and increase strength to improve the patients ability to ADLs, stand and walk for extended periods of time, navigate stairs, bend forward without pain. 30 min Neuromuscular Re-education:  [x]  See flow sheet :   Rationale: increase strength and improve coordination  to improve the patients ability to ADLs, stand and walk for extended periods of time, navigate stairs, bend forward without pain. With   [x] TE   [] TA   [] neuro   [] other: Patient Education: [x] Review HEP    [] Progressed/Changed HEP based on:   [] positioning   [] body mechanics   [] transfers   [] heat/ice application    [] other:      Other Objective/Functional Measures: Pt tolerated all treatments with no complaints of pain and only minor discomfort. Pt needed modified treatments to avoid aggravating healed lower abdominal hernia. Pain Level (0-10 scale) post treatment: Pt felt more loose than when he got here.     ASSESSMENT/Changes in Function:     Patient will continue to benefit from skilled PT services to modify and progress therapeutic interventions, address functional mobility deficits, address ROM deficits, address strength deficits, analyze and address soft tissue restrictions, analyze and cue movement patterns, analyze and modify body mechanics/ergonomics and assess and modify postural abnormalities to attain remaining goals. []  See Plan of Care  []  See progress note/recertification  []  See Discharge Summary         Progress towards goals / Updated goals:  Pt is making steady progress towards goals and will continue to make progress with more treatment.     PLAN  [x]  Upgrade activities as tolerated     [x]  Continue plan of care  [x]  Update interventions per flow sheet       []  Discharge due to:_  []  Other:_      Kit Lock, SPT 4/14/2021     TE 1  NM 2

## 2021-04-20 ENCOUNTER — APPOINTMENT (OUTPATIENT)
Dept: PHYSICAL THERAPY | Age: 71
End: 2021-04-20
Payer: COMMERCIAL

## 2021-04-22 ENCOUNTER — HOSPITAL ENCOUNTER (OUTPATIENT)
Dept: PHYSICAL THERAPY | Age: 71
Discharge: HOME | End: 2021-04-22
Payer: COMMERCIAL

## 2021-04-22 PROCEDURE — 97110 THERAPEUTIC EXERCISES: CPT | Performed by: PHYSICAL THERAPIST

## 2021-04-22 PROCEDURE — 97112 NEUROMUSCULAR REEDUCATION: CPT | Performed by: PHYSICAL THERAPIST

## 2021-04-22 NOTE — PROGRESS NOTES
PT DAILY TREATMENT NOTE - [facility] 2-15    Patient Name: Bartolo Graff  Date:2021  : 1950  [x]  Patient  Verified  Payor: Jayshree Martinez / Plan: Kali Zhou / Product Type: HMO /    In time:420 PM  Out time: 505  Total Treatment Time (min): 45  Total Timed Codes (min): 45  1:1 Treatment Time ( only): 40  Visit #: 3      Treatment Area: Neck pain [M54.2]  Low back pain [M54.5]    SUBJECTIVE  Pain Level (0-10 scale): 2/10  Any medication changes, allergies to medications, adverse drug reactions, diagnosis change, or new procedure performed?: [x] No    [] Yes (see summary sheet for update)  Subjective functional status/changes:   [] No changes reported   Pt reports compliance with HEP  Pt reports he likes his new shoes but they are a little too tight along the lateral border of the left forefoot    OBJECTIVE    35 min Therapeutic Exercise:  [x] See flow sheet :   Rationale: increase ROM and increase strength to improve the patients ability to ADLs, stand and walk for extended periods of time, navigate stairs, bend forward without pain. 10 min Neuromuscular Re-education:  [x]  See flow sheet :   Rationale: increase strength and improve coordination  to improve the patients ability to ADLs, stand and walk for extended periods of time, navigate stairs, bend forward without pain.           With   [x] TE   [] TA   [x] neuro   [] other: Patient Education: [x] Review HEP    [] Progressed/Changed HEP based on:   [x] positioning   [x] body mechanics   [] transfers   [x] heat/ice application    [] other:      Other Objective/Functional Measures:   Some knee pain with full squat so pt was instructed to perform minisquat   Max verbal and tactile cues required for correct performance of PPT    Pain Level (0-10 scale) post treatment: 2    ASSESSMENT/Changes in Function:     Patient will continue to benefit from skilled PT services to modify and progress therapeutic interventions, address functional mobility deficits, address ROM deficits, address strength deficits, analyze and address soft tissue restrictions, analyze and cue movement patterns, analyze and modify body mechanics/ergonomics and assess and modify postural abnormalities to attain remaining goals. []  See Plan of Care  []  See progress note/recertification  []  See Discharge Summary         Progress towards goals / Updated goals:  Patient continues to require verbal cues to complete exercises with correct form and postural awareness. Patient was able to advance several exercises this visit and is progressing well towards goals.     PLAN  [x]  Upgrade activities as tolerated     [x]  Continue plan of care  [x]  Update interventions per flow sheet       []  Discharge due to:_  []  Other:_      Cornell Soulier, PT 4/22/2021

## 2021-04-27 ENCOUNTER — APPOINTMENT (OUTPATIENT)
Dept: PHYSICAL THERAPY | Age: 71
End: 2021-04-27
Payer: COMMERCIAL

## 2021-04-29 ENCOUNTER — HOSPITAL ENCOUNTER (OUTPATIENT)
Dept: PHYSICAL THERAPY | Age: 71
Discharge: HOME | End: 2021-04-29
Payer: COMMERCIAL

## 2021-04-29 PROCEDURE — 97112 NEUROMUSCULAR REEDUCATION: CPT

## 2021-04-29 PROCEDURE — 97110 THERAPEUTIC EXERCISES: CPT

## 2021-04-29 NOTE — PROGRESS NOTES
Physical Therapy at [facility],   a part of  [facility]  [address] [facility] [place]  Phone: [phone] Fax: [phone]      Discharge Summary 2-15    Patient name: Britta Morin  : 1950  Provider#: [id number]  Referral source: Estrada Lucero.,*      Medical/Treatment Diagnosis: Neck pain [M54.2]  Low back pain [M54.5]     Prior Hospitalization: see medical history     Comorbidities: See Plan of Care  Prior Level of Function: See Plan of Care  Medications: Verified on Patient Summary List    Start of Care: 22      Onset Date:chronic   Visits from Start of Care: 4     Missed Visits: 2  Reporting Period : 21 to 21    Assessment/Summary of care: The patient has completed 4 physical therapy visits and has met 1 short and 1 long term goals. The patient scored a 58% on the FOTO  survey, an improvement from initial evaluation score of 48%. Patient has made limited progress with AROM and pain relief, but has not demonstrated any changes in numbness/tingling sensations. Patient has maximized therapeutic benefits and would benefit from referral back to physician and further work up of symptoms to improve QOL.       Lumbar AROM:                     R                      L  Flexion:                                               14\" HS tightness  Extension:                                           25%  Rotation:                                  25%                 25%  Side Bendin\"                   24\"     Cervical AROM  Flexion:                                               60  Extension:                                           50  Side Bendin                    22  Rotation:                                  42                    60     UPPER QUARTER MUSCLE STRENGTH                                                  R                      L  C1, C2 Neck Flex 5  C3 Side Flex                           5                      5            C4 Sh Elev                              5                      5            C5 Deltoid/Biceps                   5                      5            C6 Wrist Ext                            5                      5  C7 Triceps                               5                      5            C8 Thumb Ext                         5                      4+          T1 Hand Inst                           5                      5      strength:                          67#                  58#           LOWER QUARTER MUSCLE STRENGTH                                                  R                      L  L1, L2 Psoas                           5                      5  L3 Quads                                5                      5            L4 Tib Ant                                5                      5            L5 EHL                                    5                      5            S1 FHL                                    5                      5  S2 Hams                                 5                      5     Shoulder AROM  Flexion:                                   130                  145  Abduction:                               135                  155  Extension:                               65                    65  ER:                                          75                    85        Short Term Goals: To be accomplished in 4 weeks:  1) The patient will be independent with introductory HEP Met  2) The patient will improve lumbar flexion AROM to within 12 inches of floor to improve ease with household chores Not Met  3) The patient will improve R cervical rotation AROM to 46 deg to improve ease with driving [address] [place] [address] Avenue be accomplished in 12 weeks:  1) The patient will improve  strength by 5 PSI B to improve ease with yard tasks Met  2) 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

## 2021-04-29 NOTE — PROGRESS NOTES
PT DAILY TREATMENT NOTE - [facility] 2-15    Patient Name: Martin Kapadia  Date:2021  : 1950  [x]  Patient  Verified  Payor: Vidhi Villagran / Plan: Nidhi Lebron / Product Type: HMO /    In time:4:15p  Out time: 5:15p  Total Treatment Time (min): 60  Total Timed Codes (min): 60  1:1 Treatment Time ( only): 60  Visit #: 4      Treatment Area: Neck pain [M54.2]  Low back pain [M54.5]    SUBJECTIVE  Pain Level (0-10 scale): 0/10  Any medication changes, allergies to medications, adverse drug reactions, diagnosis change, or new procedure performed?: [x] No    [] Yes (see summary sheet for update)  Subjective functional status/changes:   [] No changes reported   Patient reports he has had some bladder issues since starting therapy. Patient reports no changes in neuropathy/numbness in hands and feet, but his neck and back feel better since starting. OBJECTIVE    45 min Therapeutic Exercise:  [x] See flow sheet :   Rationale: increase ROM and increase strength to improve the patients ability to ADLs, stand and walk for extended periods of time, navigate stairs, bend forward without pain. 15 min Neuromuscular Re-education:  [x]  See flow sheet :   Rationale: increase strength and improve coordination  to improve the patients ability to ADLs, stand and walk for extended periods of time, navigate stairs, bend forward without pain. With   [x] TE   [] TA   [x] neuro   [] other: Patient Education: [x] Review HEP    [] Progressed/Changed HEP based on:   [x] positioning   [x] body mechanics   [] transfers   [x] heat/ice application    [] other:      Other Objective/Functional Measures:    Mod cues for mini squats   Mod verbal and tactile cues required for correct performance of PPT    Lumbar AROM:  R  L  Flexion:    14\" HS tightness  Extension:    25%  Rotation:   25%  25%  Side Bendin\"  24\"    Cervical AROM  Flexion:    60  Extension:    50  Side Bendin  22  Rotation:   42  60    UPPER QUARTER MUSCLE STRENGTH      R  L  C1, C2 Neck Flex   5  C3 Side Flex   5  5   C4 Sh Elev   5  5   C5 Deltoid/Biceps  5  5   C6 Wrist Ext   5  5  C7 Triceps   5  5   C8 Thumb Ext   5  4+   T1 Hand Inst   5  5     strength:   67#  58#     LOWER QUARTER MUSCLE STRENGTH      R  L  L1, L2 Psoas   5  5  L3 Quads   5  5   L4 Tib Ant   5  5   L5 EHL   5  5   S1 FHL   5  5  S2 Hams   5  5    Shoulder AROM  Flexion:   130  145  Abduction:   135  155  Extension:   65  65  ER:    75  85        Pain Level (0-10 scale) post treatment: 0    ASSESSMENT/Changes in Function:        []  See Plan of Care  []  See progress note/recertification  [x]  See Discharge Summary         Progress towards goals / Updated goals: The patient has completed 4 physical therapy visits and has met 1 short and 1 long term goals. The patient scored a 58% on the FOTO  survey, an improvement from initial evaluation score of 48%. Patient has made limited progress with AROM and pain relief, but has not demonstrated any changes in numbness/tingling sensations. Patient has maximized therapeutic benefits and would benefit from referral back to physician and further work up of symptoms to improve QOL. 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     PLAN  []  Upgrade activities as tolerated     []  Continue plan of care  []  Update interventions per flow sheet       [x]  Discharge due to:_  []  Other:_      Mouna Tom, MELODIE 4/29/2021

## 2021-07-08 ENCOUNTER — OFFICE VISIT (OUTPATIENT)
Dept: FAMILY MEDICINE CLINIC | Age: 71
End: 2021-07-08
Payer: COMMERCIAL

## 2021-07-08 VITALS
HEART RATE: 76 BPM | HEIGHT: 68 IN | WEIGHT: 222.25 LBS | OXYGEN SATURATION: 98 % | DIASTOLIC BLOOD PRESSURE: 76 MMHG | TEMPERATURE: 98 F | BODY MASS INDEX: 33.68 KG/M2 | SYSTOLIC BLOOD PRESSURE: 140 MMHG

## 2021-07-08 DIAGNOSIS — E55.9 VITAMIN D DEFICIENCY: ICD-10-CM

## 2021-07-08 DIAGNOSIS — L71.9 ROSACEA: ICD-10-CM

## 2021-07-08 DIAGNOSIS — Z01.818 PREOP EXAMINATION: Primary | ICD-10-CM

## 2021-07-08 PROCEDURE — 99214 OFFICE O/P EST MOD 30 MIN: CPT | Performed by: STUDENT IN AN ORGANIZED HEALTH CARE EDUCATION/TRAINING PROGRAM

## 2021-07-08 RX ORDER — MINOCYCLINE HYDROCHLORIDE 100 MG/1
TABLET ORAL
Qty: 90 TABLET | Refills: 1 | Status: SHIPPED | OUTPATIENT
Start: 2021-07-08 | End: 2021-07-20

## 2021-07-08 RX ORDER — MELATONIN
2000 DAILY
Qty: 180 TABLET | Refills: 2 | Status: SHIPPED | OUTPATIENT
Start: 2021-07-08 | End: 2022-03-09

## 2021-07-08 NOTE — PROGRESS NOTES
Subjective:     Chief Complaint   Patient presents with    Pre-op Exam     Cataract surgery L eye on 7/20/21 by Dr. Selma Collins     HPI:  Oneyda Razo is a 72 y.o. male who is here for preop physical.  Patient is scheduled to have left eye cataract surgery on 7/20/2021 by Dr. Selma Collins. Will be having right eye surgery sometime after that. Patient has history of palpitations on Plavix HTN, dyslipidemia, lower extremity edema, and CPAP. Follows with cardiology. Stress test in 2019 was negative, and holter in 2019 showed PVCs. History of diabetes  Never smoked. Risk of medication withdrawal:No    Preoperative Risk assessment using 2014 ACC/AHA guidelines     1) Emergent procedure No.    2) Active Cardiac Condition: No   3) Risk procedure        Bucky.Challenger ] Low (0-1% of adverse cardiac events): superficial procedures, cataract/ breast surgery, endoscopy, superficial skin, ambulatory procedures. [ ] Intermediate (1-5%): carotid endarterectomy, intraperitoneal/intrathoracic surgery, orthopedic surgery, head and neck surgery, and prostate surgery. [ ] High (> 5%): vascular or aortic repair surgery. 4) RCRI (revised cardiac risk index) ( low risk: 0-1 = < 1% of cardiac events)    [ ] Cardiovascular disease   Bucky.Challenger ] Stroke (TIA on Plavix. TIA occurred once many years ago, never happened again). [ ] Heart failure   [ ] DM requiring insulin    [ ] Cr > 2.0    5) At least moderate functional capacity with >4 MET's w/o symptoms Yes:                Bucky.Challenger ] Climbing 1-2 flights or stairs   Bucky.Challenger ] walking a block at a brisk pace    [ [employer] chores    [ ] Recreational activites: golf, bowling, dancing, double tennis. .. 6/ Will further testing impact decisions ?  No            Past Medical History:   Diagnosis Date    Benign prostatic hyperplasia 1/15/2021    Blood glucose abnormal 1/15/2021    Essential hypertension 1/15/2021    Hypogonadism in male 1/15/2021    Mixed hyperlipidemia 1/15/2021    Other specified hypothyroidism 1/15/2021    Palpitations     Prediabetes 1/15/2021    Rosacea 1/15/2021    TIA (transient ischemic attack)     Per patient. Negative imgaing. Family History   Problem Relation Age of Onset    Heart Disease Mother         had complications after heart valve surgery     Bleeding Prob Half-brother         Brain Tumor     Bleeding Prob Half-sister         Bone Cancer     Heart Attack Half-brother         2003    Heart Attack Half-brother         18's     Social History     Socioeconomic History    Marital status:      Spouse name: Not on file    Number of children: Not on file    Years of education: Not on file    Highest education level: Not on file   Occupational History    Not on file   Tobacco Use    Smoking status: Never Smoker    Smokeless tobacco: Never Used   Vaping Use    Vaping Use: Never used   Substance and Sexual Activity    Alcohol use: Never    Drug use: Never    Sexual activity: Not on file   Other Topics Concern    Not on file   Social History Narrative    Not on file     Social Determinants of Health     Financial Resource Strain:     Difficulty of Paying Living Expenses:    Food Insecurity:     Worried About [address] in the Last Year:     [address] [email or web address] in the Last Year:    Transportation Needs:     Lack of Transportation (Medical):      Lack of Transportation (Non-Medical):    Physical Activity:     Days of Exercise per Week:     Minutes of Exercise per Session:    Stress:     Feeling of Stress :    Social Connections:     Frequency of Communication with Friends and Family:     Frequency of Social Gatherings with Friends and Family:     Attends Voodoo Services:     Active Member of Clubs or Organizations:     Attends Club or Organization Meetings:     Marital Status:    Intimate Partner Violence:     Fear of Current or Ex-Partner:     Emotionally Abused:     Physically Abused:     Sexually Abused: Current Outpatient Medications on File Prior to Visit   Medication Sig Dispense Refill    mirabegron ER (Myrbetriq) 50 mg ER tablet Take 50 mg by mouth daily.  atorvastatin (LIPITOR) 20 mg tablet TAKE 1 TABLET DAILY 90 Tab 3    minocycline (DYNACIN) 100 mg tablet TAKE 1 TABLET DAILY FOR    ROSACEA 90 Tab 1    Synthroid 100 mcg tablet TAKE 1 TABLET EVERY MORNING 90 Tab 3    clopidogreL (PLAVIX) 75 mg tab TAKE 1 TABLET ONCE DAILY 90 Tab 3    cholecalciferol (VITAMIN D3) (1000 Units /25 mcg) tablet Take 2 Tabs by mouth daily. 90 Tab 1    benazepril-hydroCHLOROthiazide (LOTENSIN HCT) 10-12.5 mg per tablet TAKE 1 TABLET DAILY FOR    BLOOD PRESSURE 90 Tab 3    Xyosted 75 mg/0.5 mL atIn INJECT 0.5ML INTO THE SKIN ONCE A WEEK      latanoprost (XALATAN) 0.005 % ophthalmic solution Administer 1 Drop to both eyes nightly.  tadalafil (CIALIS) 5 mg tablet Take 5 mg by mouth daily.  [DISCONTINUED] Synthroid 100 mcg tablet TAKE 1 TABLET EVERY MORNING 90 Tab 2    [DISCONTINUED] ergocalciferol (ERGOCALCIFEROL) 1,250 mcg (50,000 unit) capsule Take 1 Cap by mouth every seven (7) days. 8 Cap 0    [DISCONTINUED] tamsulosin (FLOMAX) 0.4 mg capsule Take 1 Cap by mouth daily. 90 Cap 1    [DISCONTINUED] minocycline (MINOCIN, DYNACIN) 100 mg capsule Take 100 mg by mouth daily. No current facility-administered medications on file prior to visit. Allergies   Allergen Reactions    Zocor [Simvastatin] Myalgia     Review of Systems   Respiratory: Negative for shortness of breath. Cardiovascular: Negative for chest pain and palpitations. Skin:        Coming and going bluish discoloration of toes and fingers         Objective:     Vitals:    07/08/21 1535   BP: (!) 140/76   Pulse: 76   Temp: 98 °F (36.7 °C)   TempSrc: Temporal   SpO2: 98%   Weight: 222 lb 4 oz (100.8 kg)   Height: 5' 8\" (1.727 m)     Physical Exam  Vitals reviewed. HENT:      Head: Normocephalic and atraumatic.    Cardiovascular: Rate and Rhythm: Normal rate and regular rhythm. Heart sounds: Normal heart sounds. Pulmonary:      Effort: Pulmonary effort is normal.      Breath sounds: Normal breath sounds. Musculoskeletal:      Comments: Compression stockings in place in bilateral lower extremities   Neurological:      Mental Status: He is oriented to person, place, and time. Psychiatric:         Behavior: Behavior normal.            Assessment/Plan:         1. Preop examination  According to the 2014 ACC/AHA preoperative risk assessment Raúl Ledezma is a low risk for cardiac complications during low risk procedure and may continue as planned. He was advised to stop Plavix 10 days prior to surgery. 2. Vitamin D deficiency  -     cholecalciferol (VITAMIN D3) (1000 Units /25 mcg) tablet; Take 2 Tablets by mouth daily. 3. Rosacea  -     minocycline (DYNACIN) 100 mg tablet; TAKE 1 TABLET DAILY FOR    ROSACEA    4. Patient is bluish discoloration of fingers and toes, with pins-and-needles sensations. Advised to continue following with neurology. Pending final assessment by neurology can try treating as rainouts, order vascular studies    Follow-up and Dispositions    · Return in about 3 months (around 10/8/2021) for Blue discoloration of finger.             Jaida Durham MD

## 2021-07-08 NOTE — PROGRESS NOTES
Chief Complaint   Patient presents with    Pre-op Exam     Cataract surgery L eye on 7/20/21 by Dr. Iggy Brewster     1. Have you been to the ER, urgent care clinic since your last visit? Hospitalized since your last visit? Yes When: 6/2021 [facility]: [facility] Reason for visit: Unable to pass urine following surgery. 2. Have you seen or consulted any other health care providers outside of the [address] [place] [address] since your last visit? Include any pap smears or colon screening.  Yes Ophthamology (cataract and glaucoma f/u), Urology (Surgery f/u)

## 2021-07-20 ENCOUNTER — HOSPITAL ENCOUNTER (OUTPATIENT)
Age: 71
Setting detail: OBSERVATION
Discharge: HOME | End: 2021-07-20
Attending: EMERGENCY MEDICINE | Admitting: INTERNAL MEDICINE
Payer: COMMERCIAL

## 2021-07-20 ENCOUNTER — APPOINTMENT (OUTPATIENT)
Dept: NON INVASIVE DIAGNOSTICS | Age: 71
End: 2021-07-20
Attending: INTERNAL MEDICINE
Payer: COMMERCIAL

## 2021-07-20 ENCOUNTER — APPOINTMENT (OUTPATIENT)
Dept: GENERAL RADIOLOGY | Age: 71
End: 2021-07-20
Attending: EMERGENCY MEDICINE
Payer: COMMERCIAL

## 2021-07-20 VITALS
HEIGHT: 68 IN | TEMPERATURE: 98.3 F | DIASTOLIC BLOOD PRESSURE: 54 MMHG | OXYGEN SATURATION: 98 % | RESPIRATION RATE: 20 BRPM | BODY MASS INDEX: 33.68 KG/M2 | HEART RATE: 63 BPM | SYSTOLIC BLOOD PRESSURE: 122 MMHG | WEIGHT: 222.22 LBS

## 2021-07-20 DIAGNOSIS — R07.9 CHEST PAIN, UNSPECIFIED TYPE: Primary | ICD-10-CM

## 2021-07-20 PROBLEM — G47.33 OSA (OBSTRUCTIVE SLEEP APNEA): Status: ACTIVE | Noted: 2021-07-20

## 2021-07-20 LAB
ALBUMIN SERPL-MCNC: 3.6 G/DL (ref 3.5–5)
ALBUMIN/GLOB SERPL: 1.3 {RATIO} (ref 1.1–2.2)
ALP SERPL-CCNC: 75 U/L (ref 45–117)
ALT SERPL-CCNC: 21 U/L (ref 12–78)
ANION GAP SERPL CALC-SCNC: 4 MMOL/L (ref 5–15)
APPEARANCE UR: CLEAR
AST SERPL-CCNC: 15 U/L (ref 15–37)
BACTERIA URNS QL MICRO: NEGATIVE /HPF
BASOPHILS # BLD: 0.1 K/UL (ref 0–0.1)
BASOPHILS NFR BLD: 1 % (ref 0–1)
BILIRUB SERPL-MCNC: 0.5 MG/DL (ref 0.2–1)
BILIRUB UR QL: NEGATIVE
BUN SERPL-MCNC: 25 MG/DL (ref 6–20)
BUN/CREAT SERPL: 21 (ref 12–20)
CALCIUM SERPL-MCNC: 8.5 MG/DL (ref 8.5–10.1)
CHLORIDE SERPL-SCNC: 111 MMOL/L (ref 97–108)
CHOLEST SERPL-MCNC: 118 MG/DL
CK MB CFR SERPL CALC: NORMAL % (ref 0–2.5)
CK MB SERPL-MCNC: <1 NG/ML (ref 5–25)
CK SERPL-CCNC: 63 U/L (ref 39–308)
CO2 SERPL-SCNC: 26 MMOL/L (ref 21–32)
COLOR UR: NORMAL
COMMENT, HOLDF: NORMAL
CREAT SERPL-MCNC: 1.17 MG/DL (ref 0.7–1.3)
DIFFERENTIAL METHOD BLD: ABNORMAL
ECHO AO ARCH DIAM: 2.98 CM
ECHO AO ASC DIAM: 3.78 CM
ECHO AO ROOT DIAM: 3.64 CM
ECHO AR MAX VEL PISA: 395.76 CENTIMETER/SECOND
ECHO AV AREA PEAK VELOCITY: 3.49 CM2
ECHO AV AREA/BSA PEAK VELOCITY: 1.6 CM2/M2
ECHO AV PEAK GRADIENT: 5.23 MMHG
ECHO AV PEAK VELOCITY: 114.31 CM/S
ECHO AV REGURGITANT PHT: 829.58 MS
ECHO IVC PROX: 1.89 CM
ECHO LA AREA 4C: 16.59 CM2
ECHO LA MAJOR AXIS: 3.8 CM
ECHO LA MINOR AXIS: 1.78 CM
ECHO LA VOL 2C: 59.11 ML (ref 18–58)
ECHO LA VOL 4C: 36.27 ML (ref 18–58)
ECHO LA VOL BP: 54.19 ML (ref 18–58)
ECHO LA VOL/BSA BIPLANE: 25.32 ML/M2 (ref 16–28)
ECHO LA VOLUME INDEX A2C: 27.62 ML/M2 (ref 16–28)
ECHO LA VOLUME INDEX A4C: 16.95 ML/M2 (ref 16–28)
ECHO LV E' LATERAL VELOCITY: 13.09 CENTIMETER/SECOND
ECHO LV E' SEPTAL VELOCITY: 9.55 CENTIMETER/SECOND
ECHO LV EDV A2C: 98.8 ML
ECHO LV EDV A4C: 133.64 ML
ECHO LV EDV BP: 112.97 ML (ref 67–155)
ECHO LV EDV INDEX A4C: 62.4 ML/M2
ECHO LV EDV INDEX BP: 52.8 ML/M2
ECHO LV EDV NDEX A2C: 46.2 ML/M2
ECHO LV EJECTION FRACTION A2C: 70 PERCENT
ECHO LV EJECTION FRACTION A4C: 55 PERCENT
ECHO LV EJECTION FRACTION BIPLANE: 64.5 PERCENT (ref 55–100)
ECHO LV ESV A2C: 29.37 ML
ECHO LV ESV A4C: 60.79 ML
ECHO LV ESV BP: 40.14 ML (ref 22–58)
ECHO LV ESV INDEX A2C: 13.7 ML/M2
ECHO LV ESV INDEX A4C: 28.4 ML/M2
ECHO LV ESV INDEX BP: 18.8 ML/M2
ECHO LV INTERNAL DIMENSION DIASTOLIC: 6.11 CM (ref 4.2–5.9)
ECHO LV INTERNAL DIMENSION SYSTOLIC: 3.85 CM
ECHO LV IVSD: 0.91 CM (ref 0.6–1)
ECHO LV MASS 2D: 222.6 G (ref 88–224)
ECHO LV MASS INDEX 2D: 104 G/M2 (ref 49–115)
ECHO LV POSTERIOR WALL DIASTOLIC: 0.89 CM (ref 0.6–1)
ECHO LVOT DIAM: 2.43 CM
ECHO LVOT PEAK GRADIENT: 2.95 MMHG
ECHO LVOT PEAK VELOCITY: 85.87 CM/S
ECHO MV A VELOCITY: 49 CENTIMETER/SECOND
ECHO MV AREA PHT: 3.07 CM2
ECHO MV E DECELERATION TIME (DT): 247.08 MS
ECHO MV E VELOCITY: 52.58 CENTIMETER/SECOND
ECHO MV PRESSURE HALF TIME (PHT): 71.65 MS
ECHO PV MAX VELOCITY: 99.67 CM/S
ECHO PV PEAK INSTANTANEOUS GRADIENT SYSTOLIC: 4 MMHG
ECHO RV INTERNAL DIMENSION: 3.84 CM
ECHO RV TAPSE: 2.13 CM (ref 1.5–2)
ECHO TV REGURGITANT MAX VELOCITY: 264.33 CM/S
ECHO TV REGURGITANT PEAK GRADIENT: 27.95 MMHG
EOSINOPHIL # BLD: 0.2 K/UL (ref 0–0.4)
EOSINOPHIL NFR BLD: 3 % (ref 0–7)
EPITH CASTS URNS QL MICRO: NORMAL /LPF
ERYTHROCYTE [DISTWIDTH] IN BLOOD BY AUTOMATED COUNT: 13.2 % (ref 11.5–14.5)
EST. AVERAGE GLUCOSE BLD GHB EST-MCNC: 105 MG/DL
GLOBULIN SER CALC-MCNC: 2.8 G/DL (ref 2–4)
GLUCOSE SERPL-MCNC: 101 MG/DL (ref 65–100)
GLUCOSE UR STRIP.AUTO-MCNC: NEGATIVE MG/DL
HBA1C MFR BLD: 5.3 % (ref 4–5.6)
HCT VFR BLD AUTO: 43.5 % (ref 36.6–50.3)
HDLC SERPL-MCNC: 40 MG/DL
HDLC SERPL: 3 {RATIO} (ref 0–5)
HGB BLD-MCNC: 14.4 G/DL (ref 12.1–17)
HGB UR QL STRIP: NEGATIVE
IMM GRANULOCYTES # BLD AUTO: 0 K/UL (ref 0–0.04)
IMM GRANULOCYTES NFR BLD AUTO: 1 % (ref 0–0.5)
KETONES UR QL STRIP.AUTO: NEGATIVE MG/DL
LA VOL DISK BP: 47.78 ML (ref 18–58)
LDLC SERPL CALC-MCNC: 55.2 MG/DL (ref 0–100)
LEUKOCYTE ESTERASE UR QL STRIP.AUTO: NEGATIVE
LIPASE SERPL-CCNC: 193 U/L (ref 73–393)
LYMPHOCYTES # BLD: 1.3 K/UL (ref 0.8–3.5)
LYMPHOCYTES NFR BLD: 22 % (ref 12–49)
MAGNESIUM SERPL-MCNC: 2 MG/DL (ref 1.6–2.4)
MCH RBC QN AUTO: 32.7 PG (ref 26–34)
MCHC RBC AUTO-ENTMCNC: 33.1 G/DL (ref 30–36.5)
MCV RBC AUTO: 98.9 FL (ref 80–99)
MONOCYTES # BLD: 0.6 K/UL (ref 0–1)
MONOCYTES NFR BLD: 9 % (ref 5–13)
NEUTS SEG # BLD: 3.8 K/UL (ref 1.8–8)
NEUTS SEG NFR BLD: 64 % (ref 32–75)
NITRITE UR QL STRIP.AUTO: NEGATIVE
NRBC # BLD: 0 K/UL (ref 0–0.01)
NRBC BLD-RTO: 0 PER 100 WBC
PH UR STRIP: 5 [PH] (ref 5–8)
PLATELET # BLD AUTO: 193 K/UL (ref 150–400)
PMV BLD AUTO: 10.1 FL (ref 8.9–12.9)
POTASSIUM SERPL-SCNC: 4.1 MMOL/L (ref 3.5–5.1)
PROT SERPL-MCNC: 6.4 G/DL (ref 6.4–8.2)
PROT UR STRIP-MCNC: NEGATIVE MG/DL
RBC # BLD AUTO: 4.4 M/UL (ref 4.1–5.7)
RBC #/AREA URNS HPF: NORMAL /HPF (ref 0–5)
SAMPLES BEING HELD,HOLD: NORMAL
SODIUM SERPL-SCNC: 141 MMOL/L (ref 136–145)
SP GR UR REFRACTOMETRY: 1.02 (ref 1–1.03)
T4 FREE SERPL-MCNC: 0.9 NG/DL (ref 0.8–1.5)
TRIGL SERPL-MCNC: 114 MG/DL (ref ?–150)
TROPONIN I SERPL-MCNC: <0.05 NG/ML
TROPONIN I SERPL-MCNC: <0.05 NG/ML
TSH SERPL DL<=0.05 MIU/L-ACNC: 5.53 UIU/ML (ref 0.36–3.74)
UR CULT HOLD, URHOLD: NORMAL
UROBILINOGEN UR QL STRIP.AUTO: 0.2 EU/DL (ref 0.2–1)
VLDLC SERPL CALC-MCNC: 22.8 MG/DL
WBC # BLD AUTO: 6 K/UL (ref 4.1–11.1)
WBC URNS QL MICRO: NORMAL /HPF (ref 0–4)

## 2021-07-20 PROCEDURE — 99285 EMERGENCY DEPT VISIT HI MDM: CPT

## 2021-07-20 PROCEDURE — 71046 X-RAY EXAM CHEST 2 VIEWS: CPT

## 2021-07-20 PROCEDURE — 93306 TTE W/DOPPLER COMPLETE: CPT | Performed by: STUDENT IN AN ORGANIZED HEALTH CARE EDUCATION/TRAINING PROGRAM

## 2021-07-20 PROCEDURE — 93306 TTE W/DOPPLER COMPLETE: CPT

## 2021-07-20 PROCEDURE — 36415 COLL VENOUS BLD VENIPUNCTURE: CPT

## 2021-07-20 PROCEDURE — 84443 ASSAY THYROID STIM HORMONE: CPT

## 2021-07-20 PROCEDURE — 93005 ELECTROCARDIOGRAM TRACING: CPT

## 2021-07-20 PROCEDURE — 84439 ASSAY OF FREE THYROXINE: CPT

## 2021-07-20 PROCEDURE — 80053 COMPREHEN METABOLIC PANEL: CPT

## 2021-07-20 PROCEDURE — 83735 ASSAY OF MAGNESIUM: CPT

## 2021-07-20 PROCEDURE — 85025 COMPLETE CBC W/AUTO DIFF WBC: CPT

## 2021-07-20 PROCEDURE — 83690 ASSAY OF LIPASE: CPT

## 2021-07-20 PROCEDURE — 96372 THER/PROPH/DIAG INJ SC/IM: CPT

## 2021-07-20 PROCEDURE — 99218 HC RM OBSERVATION: CPT

## 2021-07-20 PROCEDURE — 80061 LIPID PANEL: CPT

## 2021-07-20 PROCEDURE — 81001 URINALYSIS AUTO W/SCOPE: CPT

## 2021-07-20 PROCEDURE — 74011250636 HC RX REV CODE- 250/636: Performed by: INTERNAL MEDICINE

## 2021-07-20 PROCEDURE — 82550 ASSAY OF CK (CPK): CPT

## 2021-07-20 PROCEDURE — 83036 HEMOGLOBIN GLYCOSYLATED A1C: CPT

## 2021-07-20 PROCEDURE — 99218 PR INITIAL OBSERVATION CARE/DAY 30 MINUTES: CPT | Performed by: STUDENT IN AN ORGANIZED HEALTH CARE EDUCATION/TRAINING PROGRAM

## 2021-07-20 PROCEDURE — 84484 ASSAY OF TROPONIN QUANT: CPT

## 2021-07-20 PROCEDURE — 74011250637 HC RX REV CODE- 250/637: Performed by: INTERNAL MEDICINE

## 2021-07-20 RX ORDER — ONDANSETRON 2 MG/ML
4 INJECTION INTRAMUSCULAR; INTRAVENOUS
Status: DISCONTINUED | OUTPATIENT
Start: 2021-07-20 | End: 2021-07-20 | Stop reason: HOSPADM

## 2021-07-20 RX ORDER — TESTOSTERONE ENANTHATE 75 MG/.5ML
0.5 INJECTION SUBCUTANEOUS
COMMUNITY

## 2021-07-20 RX ORDER — ACETAMINOPHEN 325 MG/1
650 TABLET ORAL
Status: DISCONTINUED | OUTPATIENT
Start: 2021-07-20 | End: 2021-07-20 | Stop reason: HOSPADM

## 2021-07-20 RX ORDER — SODIUM CHLORIDE 0.9 % (FLUSH) 0.9 %
5-40 SYRINGE (ML) INJECTION AS NEEDED
Status: DISCONTINUED | OUTPATIENT
Start: 2021-07-20 | End: 2021-07-20 | Stop reason: HOSPADM

## 2021-07-20 RX ORDER — SODIUM CHLORIDE 0.9 % (FLUSH) 0.9 %
5-40 SYRINGE (ML) INJECTION EVERY 8 HOURS
Status: DISCONTINUED | OUTPATIENT
Start: 2021-07-20 | End: 2021-07-20 | Stop reason: HOSPADM

## 2021-07-20 RX ORDER — MINOCYCLINE HYDROCHLORIDE 100 MG/1
50 TABLET ORAL DAILY
COMMUNITY
End: 2021-07-27

## 2021-07-20 RX ORDER — SODIUM CHLORIDE 9 MG/ML
75 INJECTION, SOLUTION INTRAVENOUS CONTINUOUS
Status: DISCONTINUED | OUTPATIENT
Start: 2021-07-20 | End: 2021-07-20 | Stop reason: HOSPADM

## 2021-07-20 RX ORDER — ACETAMINOPHEN 650 MG/1
650 SUPPOSITORY RECTAL
Status: DISCONTINUED | OUTPATIENT
Start: 2021-07-20 | End: 2021-07-20 | Stop reason: HOSPADM

## 2021-07-20 RX ORDER — GUAIFENESIN 100 MG/5ML
162 LIQUID (ML) ORAL
Status: COMPLETED | OUTPATIENT
Start: 2021-07-20 | End: 2021-07-20

## 2021-07-20 RX ORDER — ATORVASTATIN CALCIUM 20 MG/1
20 TABLET, FILM COATED ORAL DAILY
Status: DISCONTINUED | OUTPATIENT
Start: 2021-07-20 | End: 2021-07-20 | Stop reason: HOSPADM

## 2021-07-20 RX ORDER — POLYETHYLENE GLYCOL 3350 17 G/17G
17 POWDER, FOR SOLUTION ORAL DAILY PRN
Status: DISCONTINUED | OUTPATIENT
Start: 2021-07-20 | End: 2021-07-20 | Stop reason: HOSPADM

## 2021-07-20 RX ORDER — ENOXAPARIN SODIUM 100 MG/ML
40 INJECTION SUBCUTANEOUS EVERY 24 HOURS
Status: DISCONTINUED | OUTPATIENT
Start: 2021-07-20 | End: 2021-07-20 | Stop reason: HOSPADM

## 2021-07-20 RX ADMIN — ASPIRIN 162 MG: 81 TABLET, CHEWABLE ORAL at 05:19

## 2021-07-20 RX ADMIN — Medication 10 ML: at 05:28

## 2021-07-20 RX ADMIN — ATORVASTATIN CALCIUM 20 MG: 20 TABLET, FILM COATED ORAL at 09:06

## 2021-07-20 RX ADMIN — ENOXAPARIN SODIUM 40 MG: 40 INJECTION SUBCUTANEOUS at 05:28

## 2021-07-20 RX ADMIN — SODIUM CHLORIDE 75 ML/HR: 9 INJECTION, SOLUTION INTRAVENOUS at 05:19

## 2021-07-20 NOTE — ED TRIAGE NOTES
Triage: Pt advises that he was asleep, felt a weird feeling in his stomach, was sweating and had nausea. Symptoms have self resolved since then.

## 2021-07-20 NOTE — DISCHARGE SUMMARY
Shyam Candelaria [facility]  [address] [facility], [address] [place] [address]  [phone]    Physician Discharge Summary     Patient ID:  Cherelle Lawrence  [id number]  52 y.o.  1950    Admit date: 7/20/2021    Discharge date and time: 7/20/2021 11:32 AM    Admission Diagnoses: Chest pain [R07.9]    Discharge Diagnoses:  Principal Diagnosis Chest pain                                            Principal Problem:    Chest pain (7/20/2021)    Active Problems:    Severe obesity ([address] (9/10/2019)      Essential hypertension (1/15/2021)      Other specified hypothyroidism (1/15/2021)      Mixed hyperlipidemia (1/15/2021)      Prediabetes (1/15/2021)      TIA (transient ischemic attack) ()      Overview: Per patient. Negative imgaing. LEO (obstructive sleep apnea) (7/20/2021)           Hospital Course:     73 yo hx of HTN, TIA, hypothyroid, presented w/ chest pain    1) Chest pain: likely non-cardiac. EKG and Trop neg. Echo and Cardiolite in 2019 unremarkable. Cards already evaluated the patient and recommended discharge.   Patient will f/u with Dr. Lucrecia Jones     2) HTN: cont benazepril-HCTZ    3) Hx of TIA: cont plavix, statin     4) Hypothyroid: cont synthroid      PCP: Rah Dowling MD     Consults: Cardiology    Significant Diagnostic Studies: none    Discharge Exam:  Physical Exam:    Gen: Well-developed, well-nourished, obese, in no acute distress  HEENT:  Pink conjunctivae, PERRL, hearing intact to voice, moist mucous membranes  Neck: Supple, without masses, thyroid non-tender  Resp: No accessory muscle use, clear breath sounds without wheezes rales or rhonchi  Card: No murmurs, normal S1, S2 without thrills, bruits or peripheral edema  Abd:  Soft, non-tender, non-distended, normoactive bowel sounds are present  Lymph:  No cervical or inguinal adenopathy  Musc: No cyanosis or clubbing  Skin: No rashes   Neuro:  Cranial nerves are grossly intact, no focal motor weakness, follows commands appropriately  Psych:  Good insight, oriented to person, place and time, alert    Disposition: home  Discharge Condition: Stable    Patient Instructions:   Current Discharge Medication List      CONTINUE these medications which have NOT CHANGED    Details   minocycline (DYNACIN) 100 mg tablet Take 50 mg by mouth daily. Indications: rosacea      testosterone enanthate (Xyosted) 75 mg/0.5 mL atIn 0.5 mL by SubCUTAneous route Every Saturday. mirabegron ER (Myrbetriq) 50 mg ER tablet Take 50 mg by mouth daily. cholecalciferol (VITAMIN D3) (1000 Units /25 mcg) tablet Take 2 Tablets by mouth daily. Qty: 180 Tablet, Refills: 2    Associated Diagnoses: Vitamin D deficiency      atorvastatin (LIPITOR) 20 mg tablet TAKE 1 TABLET DAILY  Qty: 90 Tab, Refills: 3    Associated Diagnoses: Mixed hyperlipidemia      Synthroid 100 mcg tablet TAKE 1 TABLET EVERY MORNING  Qty: 90 Tab, Refills: 3      clopidogreL (PLAVIX) 75 mg tab TAKE 1 TABLET ONCE DAILY  Qty: 90 Tab, Refills: 3      benazepril-hydroCHLOROthiazide (LOTENSIN HCT) 10-12.5 mg per tablet TAKE 1 TABLET DAILY FOR    BLOOD PRESSURE  Qty: 90 Tab, Refills: 3      latanoprost (XALATAN) 0.005 % ophthalmic solution Administer 1 Drop to both eyes nightly. tadalafil (CIALIS) 5 mg tablet Take 5 mg by mouth daily.            Activity: Activity as tolerated  Diet: Cardiac Diet  Wound Care: None needed    Follow-up with  Follow-up Information     Follow up With Specialties Details Why Contact Info    Carlos Cano MD Family Medicine Schedule an appointment as soon as possible for a visit in 5 days  [facility]  [address] [place] [address],[address]  [address] [address] Keeley Gates MD Cardiology Schedule an appointment as soon as possible for a visit in 2 weeks  [address] [facility] [address] [phone]            Follow-up tests/labs none    Signed:  Dianna Zarate MD  7/20/2021  11:32 AM  **I personally spent 31 min on discharge**

## 2021-07-20 NOTE — CONSULTS
Matt Rodriguez DO  [facility] [address], [address] [facility], [address] [place] [address]                                       Office [phone],STO [phone]  Office [phone],XFA [phone]      Date of  Admission: 7/20/2021  2:58 AM  PCP- Juan Velasquez MD    Andi Garcia is a 72 y.o. male admitted for Chest pain [R07.9]. Consult requested by Jess Nicole MD    Assessment/Plan      Transient epigastric discomfort, nausea, diaphoresis      He is without any chest pain symptoms with this constellation of epigastric discomfort and diaphoresis. No recent exertional chest pain symptoms. Echocardiogram in 2019 showed preserved LV function without significant valvular pathology. Cardiolite 10/2019 with out evidence of ischemia. Patient without any recent exertional chest pain symptoms. Troponins have been negative x2. Recommend no further cardiovascular work-up as an inpatient at this time. Patient is scheduled to follow-up with Dr. Jailyn Haskins the beginning of August of this year. Recommend outpatient follow-up with cardiology. History of symptomatic PVCs  History of TIA  Hypertension  LEO         []    High complexity decision making was performed  []    Patient is at high-risk of decompensation with multiple organ involvement      I appreciate the opportunity to be involved in Mr. Dailey. See below note for details. Please do not hesitate to contact us with questions or concerns. Selena Caraballo DO      Subjective:  Andi Garcia is a 72 y.o. male presents to the ED for evaluation of epigastric discomfort and diaphoresis. Patient has a history of hypertension, TIA, PVCs and obstructive sleep apnea. He reports that he woke up in the middle of night with a difficult to describe discomfort in his epigastric region. He was without any chest pain symptoms. He felt very nauseous and felt like he was going to vomit, however he did not vomit.   He laid down on the bed and developed severe diaphoresis that lasted several minutes. Symptoms resolved, however he presented to the ED for further evaluation. He denies ever having any chest pain symptoms. Recently without any exertional chest pain symptoms. No shortness of breath, orthopnea, PND. Past Medical History:   Diagnosis Date    Benign prostatic hyperplasia 1/15/2021    Blood glucose abnormal 1/15/2021    Essential hypertension 1/15/2021    Hypogonadism in male 1/15/2021    Mixed hyperlipidemia 1/15/2021    Other specified hypothyroidism 1/15/2021    Palpitations     Prediabetes 1/15/2021    Rosacea 1/15/2021    TIA (transient ischemic attack)     Per patient. Negative imgaing.       Past Surgical History:   Procedure Laterality Date    HX CIRCUMCISION  2001    HX COLONOSCOPY  07/13/2018    2 adenomatous polyps removed -Dr. Bertin Soto HX COLONOSCOPY  01/01/2012    HX CYST REMOVAL  1963    pilonidal cyst surgery    HX HERNIA REPAIR      HX KNEE REPLACEMENT Bilateral     HX ORTHOPAEDIC      Neuroma on foot removed     HX VASECTOMY      HX VEIN STRIPPING  12/2008    Dr. Peter Gomez Zocor [Simvastatin] Myalgia     Family History   Problem Relation Age of Onset    Heart Disease Mother         had complications after heart valve surgery     Bleeding Prob Half-brother         Brain Tumor     Bleeding Prob Half-sister         Bone Cancer     Heart Attack Half-brother         2003    Heart Attack Half-brother         1980's      Social History     Tobacco Use    Smoking status: Never Smoker    Smokeless tobacco: Never Used   Vaping Use    Vaping Use: Never used   Substance Use Topics    Alcohol use: Never    Drug use: Never          Medications:  (Not in a hospital admission)    Current Facility-Administered Medications   Medication Dose Route Frequency    sodium chloride (NS) flush 5-40 mL  5-40 mL IntraVENous Q8H    sodium chloride (NS) flush 5-40 mL 5-40 mL IntraVENous PRN    acetaminophen (TYLENOL) tablet 650 mg  650 mg Oral Q6H PRN    Or    acetaminophen (TYLENOL) suppository 650 mg  650 mg Rectal Q6H PRN    polyethylene glycol (MIRALAX) packet 17 g  17 g Oral DAILY PRN    enoxaparin (LOVENOX) injection 40 mg  40 mg SubCUTAneous Q24H    ondansetron (ZOFRAN) injection 4 mg  4 mg IntraVENous Q6H PRN    0.9% sodium chloride infusion  75 mL/hr IntraVENous CONTINUOUS    atorvastatin (LIPITOR) tablet 20 mg  20 mg Oral DAILY     Current Outpatient Medications   Medication Sig    minocycline (DYNACIN) 100 mg tablet Take 50 mg by mouth daily. Indications: rosacea    testosterone enanthate (Xyosted) 75 mg/0.5 mL atIn 0.5 mL by SubCUTAneous route Every Saturday.  mirabegron ER (Myrbetriq) 50 mg ER tablet Take 50 mg by mouth daily.  cholecalciferol (VITAMIN D3) (1000 Units /25 mcg) tablet Take 2 Tablets by mouth daily.  atorvastatin (LIPITOR) 20 mg tablet TAKE 1 TABLET DAILY    Synthroid 100 mcg tablet TAKE 1 TABLET EVERY MORNING    clopidogreL (PLAVIX) 75 mg tab TAKE 1 TABLET ONCE DAILY    benazepril-hydroCHLOROthiazide (LOTENSIN HCT) 10-12.5 mg per tablet TAKE 1 TABLET DAILY FOR    BLOOD PRESSURE    latanoprost (XALATAN) 0.005 % ophthalmic solution Administer 1 Drop to both eyes nightly.  tadalafil (CIALIS) 5 mg tablet Take 5 mg by mouth daily.          Review of Systems:  Pertinent Positive transient nausea, epigastric discomfort, diaphoresis  Pertinent Negative:No chest pain, dyspnea on exertion, shortness of breath, orthopnea, PND    All Other systems reviewed and are negative for a Comprehensive ROS (10+)        Physical Exam:  Visit Vitals  BP (!) 122/54   Pulse 63   Temp 98.3 °F (36.8 °C)   Resp 20   Ht 5' 8\" (1.727 m)   Wt 222 lb 3.6 oz (100.8 kg)   SpO2 98%   BMI 33.79 kg/m²           Gen: Well-developed, well-nourished, in no acute distress  HEENT:  Pink conjunctivae, hearing intact to voice, moist mucous membranes  Neck: Supple,No JVD, No Carotid Bruit  Resp: No accessory muscle use, Clear breath sounds, No rales or rhonchi  Card: Normal Rate,Regular Rythm,Normal S1, S2, No murmurs, rubs or gallop. No thrills. Abd:  Soft, non-tender, non-distended, normoactive bowel sounds are present   MSK: No cyanosis or clubbing  Skin: No rashes or ulcers  Neuro:  Cranial nerves are grossly intact, moving all four extremities, no focal deficit, follows commands appropriately  Psych:  Good insight, oriented to person, place and time, alert, Nml Affect  LE: No edema  Vascular:Distal Pulses 2+ and symmetric      Telemetry: normal sinus rhythm    EKG:          Cardiac Testing/ Procedures:    10/21/19    ECHO ADULT COMPLETE 10/22/2019 10/22/2019    Interpretation Summary  · Left Ventricle: Normal cavity size and systolic function (ejection fraction normal). Upper normal wall thickness. Calculated left ventricular ejection fraction is 60%. Biplane method used to measure ejection fraction. No regional wall motion abnormality noted. · Left Atrium: Mildly dilated left atrium. · Aortic Valve: Aortic valve sclerosis. Mild aortic valve regurgitation is present. · Mitral Valve: Mitral valve thickening. Mild mitral valve regurgitation is present. · Tricuspid Valve: Mild tricuspid valve regurgitation is present. Pulmonary arterial systolic pressure is 65.2 mmHg. · Pulmonic Valve: Mild pulmonic valve regurgitation is present. Signed by: Rupal Lim MD on 10/22/2019 10:57 AM    10/21/19    NUCLEAR CARDIAC STRESS TEST 10/21/2019 10/23/2019    Interpretation Summary  · Gated SPECT: Left ventricular function post-stress was normal. Calculated ejection fraction is 51%. · Myocardial perfusion imaging defect 1: Prone imaging acquired  · Baseline ECG: Normal sinus rhythm, myocardial infarction. The infarction is located in the anteroseptal regions. .  · Moderate risk Duke treadmill score.   · Inconclusive stress test. no symptoms of CP, lateral ST depression, exercise duration of 8 mins. · Normal myocardial perfusion imaging. Signed by: Helen Guerrero DO on 10/21/2019  5:05 PM      LABS:    Lab Results   Component Value Date/Time    WBC 6.0 07/20/2021 03:23 AM    HGB 14.4 07/20/2021 03:23 AM    HCT 43.5 07/20/2021 03:23 AM    PLATELET 287 03/03/2525 03:23 AM    MCV 98.9 07/20/2021 03:23 AM     Lab Results   Component Value Date/Time    Sodium 141 07/20/2021 03:23 AM    Potassium 4.1 07/20/2021 03:23 AM    Chloride 111 (H) 07/20/2021 03:23 AM    CO2 26 07/20/2021 03:23 AM    Anion gap 4 (L) 07/20/2021 03:23 AM    Glucose 101 (H) 07/20/2021 03:23 AM    BUN 25 (H) 07/20/2021 03:23 AM    Creatinine 1.17 07/20/2021 03:23 AM    BUN/Creatinine ratio 21 (H) 07/20/2021 03:23 AM    GFR est AA >60 07/20/2021 03:23 AM    GFR est non-AA >60 07/20/2021 03:23 AM    Calcium 8.5 07/20/2021 03:23 AM     Lab Results   Component Value Date/Time    CK 63 07/20/2021 09:00 AM    CK-MB Index Cannot be calculated 07/20/2021 09:00 AM    Troponin-I, Qt. <0.05 07/20/2021 09:05 AM     No results found for: APTT  No results found for: INR, PTMR, PTP, PT1, PT2, INREXT        Kednall Kramer DO    ATTENTION:   This medical record was transcribed using an electronic medical records/speech recognition system. Although proofread, it may and can contain electronic, spelling and other errors. Corrections may be executed at a later time. Please feel free to contact us for any clarifications as needed.

## 2021-07-20 NOTE — ED NOTES
Verbal shift change report given to Crystal RN (oncoming nurse) by Tono Marx RN  (offgoing nurse).  Report included the following information SBAR, ED summary, MAR.

## 2021-07-20 NOTE — DISCHARGE INSTRUCTIONS
HOSPITALIST DISCHARGE INSTRUCTIONS  NAME: Barbra Saleh   :  1950   MRN:  [id number]     Date/Time:  2021 11:31 AM    ADMIT DATE: 2021     DISCHARGE DATE: 2021     ADMITTING DIAGNOSIS:  Non-cardiac chest pain    DISCHARGE DIAGNOSIS:  same    MEDICATIONS:  See after visit summary       · It is important that you take the medication exactly as they are prescribed. · Keep your medication in the bottles provided by the pharmacist and keep a list of the medication names, dosages, and times to be taken in your wallet. · Do not take other medications without consulting your doctor     Pain Management: per above medications    What to do at Home    Recommended diet:  Cardiac Diet    Recommended activity: Activity as tolerated    1) Return to the hospital if you feel worse    2) If you experience any of the following symptoms then please call your primary care physician or return to the emergency room if you cannot get hold of your doctor:  Fever, chills, nausea, vomiting, diarrhea, change in mentation, falling, bleeding, shortness of breath, chest pain, severe headache, severe abdominal pain,     3) Follow up with your cardiologist     Follow Up: Follow-up Information     Follow up With Specialties Details Why Contact Info    Kenroy Shanks MD Family Medicine Schedule an appointment as soon as possible for a visit in 5 days  [facility]  [address] [facility],[address]  [address] [address] Keeley Cloud MD Cardiology Schedule an appointment as soon as possible for a visit in 2 weeks  [address]  [phone]              Information obtained by :  I understand that if any problems occur once I am at home I am to contact my physician. I understand and acknowledge receipt of the instructions indicated above. Physician's or R.N.'s Signature                                                                  Date/Time                                                                                                                                              Patient or Representative Signature                                                          Date/Time

## 2021-07-20 NOTE — PROGRESS NOTES
CARE MANAGEMENT INITIAL ASSESSEMENT      NAME:   Aaliyah Adam   :     1950   MRN:     [id number]       Emergency Contact:  Extended Emergency Contact Information  Primary Emergency Contact: [facility] JOSE A  Address: [address] [place] [address], Sudhakar Uribe [address] [place] [address] Phone: [phone]  Mobile Phone: [phone]  Relation: Spouse  Preferred language: ENGLISH   needed? No  Secondary Emergency Contact: Cristobal Bond 25 Phone: [phone]  Relation: Unknown    Advance Directive:  Full Code, has an advance directive. Copy not available. Gume [facility] Healthcare Decision Maker:   Drake Rosaod- spouse- [phone]    Reason for Admission:  Mr. Jh Hewitt is a 72 y.o. male with history that includes HTN and HLD  who was emergently admitted for:  chest pain    Patient Active Problem List   Diagnosis Code    Severe obesity ([facility] [address] E66.01    Benign prostatic hyperplasia N40.0    Essential hypertension I10    Hypogonadism in male E29.1    Other specified hypothyroidism E03.8    Mixed hyperlipidemia E78.2    Prediabetes R73.03    Rosacea L71.9    Neuropathy G62.9    Cervical spondylosis with radiculopathy M47.22    DDD (degenerative disc disease), cervical M50.30    Other spondylosis with radiculopathy, lumbar region M47.26    TIA (transient ischemic attack) G45.9    Chest pain R07.9    LEO (obstructive sleep apnea) G47.33       Assessment: In person with patient. RUR:  N/A OBS  Risk Level:  N/A  Value-based purchasing:   No  Bundle patient:  No    Residency:  Private residence  Exterior Steps:  3  Interior Steps:  14. Pt's bedroom is on the 1st floor. Pt denies problems with stairs. Lives With:  Spouse    Prior functioning:  Independent.   Patient requires assistance with:  N/A    Prior DME required:  None    DME available:  None    Rehab history:  None    Discharge Concerns:  None      Insurer:  Payor: Teena Olivera / Plan: Michael Power / Product Type: HMO / Observation notice provided in writing to patient and/or caregiver as well as verbal explanation of the policy. Patients who are in outpatient status also receive the Observation notice. Patient has received notice and or patient representative has received via secure email, fax, or certified mail based on patient representative's preference. PCP: Delmar Fabry, MD   Name of Practice:  [facility]   Current patient: Yes   Approximate date of last visit: 2 weeks   Access to virtual PCP visits:  Yes    Pharmacy:  [facility]. Pt denies any problems obtaining/taking medications. COVID-19 vaccination status:  Fully vaccinated on February 2021    DC Transport:  Family      Transition of care plan:  Home with outpatient follow-up     Comments:   CM met with Pt to complete initial assessment. Pt states that he lives at home with his spouse. Pt has no hx of HH, home O2, or equipment. Pt is independent with ADLs and ambulation. Pt denies problems with either. Discharge plan is for Pt to return home. Pt states family will transport him home.   _____________________________________  Jermaine Isis, [address] [place] [address] Drive Management  7/20/2021   1:59 PM      Care Management Interventions  PCP Verified by CM: Yes Harmony Whitman MD)  Last Visit to PCP: 07/06/21  Mode of Transport at Discharge:  Other (see comment) (spouse)  Transition of Care Consult (CM Consult): Discharge Planning  MyChart Signup: No  Discharge Durable Medical Equipment: No  Physical Therapy Consult: No  Occupational Therapy Consult: No  Speech Therapy Consult: No  Current Support Network: Lives with Spouse, Own Home  Confirm Follow Up Transport: Family  Discharge Location  Discharge Placement: Home with outpatient services

## 2021-07-20 NOTE — ED NOTES
Verbal shift change report given to Narda (oncoming nurse) by Georgi Martinez (offgoing nurse). Report included the following information SBAR, ED Summary, MAR and Recent Results.

## 2021-07-20 NOTE — H&P
[facility]  [address] [facility], [facility] [address]  [phone]    Hospitalist Admission Note      NAME:  Michael Yanez   :   1950   MRN:  [id number]     PCP:  Urvashi Jc MD     Date of Service/Time:  2021 5:07 AM         Assessment / Plan:       72 y.o. male with hx of TIA, HTN, LEO presenting w/ substernal chest pain, admitted for further evaluation      Substernal chest pain: no evidence of ACS, yet. EKG no dynamic ischemic changes. Troponin negative. Will check serial cardiac biomarkers. TTE. Cardiology consult. ASA, statin. Unlikely dissection, PE, etc. No hypoxia or tachycardia or dyspnea. Ddx includes GI-related. Lipase WNR. Possible hiatal hernia, GERD, gastritis, esophagitis, etc.       Essential hypertension: BP initially very high, now much improved. Resume home benazepril-HCTZ pending med rec      Hx of TIA: on Plavix which we will continue       LEO / obesity: Would benefit from weight loss and dietary / lifestyle modifications      Other specified hypothyroidism: TSH sent in ED. borderline high. Add on FT4. Continue LT4      Mixed hyperlipidemia: check FLP. Cont statin      Prediabetes: BG controlled. Check A1c      LEO (obstructive sleep apnea): CPAP QHS         Code Status: full     Surrogate decision maker: wife      ED notes, lab results, and imaging studies reviewed.        Total time spent with patient: 50 Minutes   Time spent in the care of this patient included reviewing records, discussing with nursing, obtaining history and examining the patient, and discussing treatment plans, with >50% time spent counseling/coordinating care    Risk of deterioration: High                 Care Plan discussed with: ED provider, Patient, Nursing Staff and >50% of time spent in counseling and coordination of care    Discussed:  Care Plan and D/C Planning    Prophylaxis:  Lovenox    Disposition:  Home w/Family                 Subjective:     CHIEF COMPLAINT: substernal CP    HISTORY OF PRESENT ILLNESS:     Mr. Donaldo Benitez is a 72 y.o. male w/ hx of TIA, HTN, LEO presenting w/ substernal chest pain. Woke him from sleep overnight w/ epigastric/substernal pain, described as discomfort, associated with nausea and diaphoresis. Pain was moderate, now much better and pain-free. No associated with dyspnea, dizziness, palpitations, f/c.     ED workup was unremarkable. Mr. Donaldo Benitez is admitted for further evaluation and management. Past Medical History:   Diagnosis Date    Benign prostatic hyperplasia 1/15/2021    Blood glucose abnormal 1/15/2021    Essential hypertension 1/15/2021    Hypogonadism in male 1/15/2021    Mixed hyperlipidemia 1/15/2021    Other specified hypothyroidism 1/15/2021    Palpitations     Prediabetes 1/15/2021    Rosacea 1/15/2021    TIA (transient ischemic attack)     Per patient. Negative imgaing. Past Surgical History:   Procedure Laterality Date    HX CIRCUMCISION  2001    HX COLONOSCOPY  07/13/2018    2 adenomatous polyps removed -Dr. Angel Lima HX COLONOSCOPY  01/01/2012    HX CYST REMOVAL  1963    pilonidal cyst surgery    HX HERNIA REPAIR      HX KNEE REPLACEMENT Bilateral     HX ORTHOPAEDIC      Neuroma on foot removed     HX VASECTOMY      HX VEIN STRIPPING  12/2008    Dr. Neptali [place] History     Tobacco Use    Smoking status: Never Smoker    Smokeless tobacco: Never Used   Substance Use Topics    Alcohol use: Never        Family History   Problem Relation Age of Onset    Heart Disease Mother         had complications after heart valve surgery     Bleeding Prob Half-brother         Brain Tumor     Bleeding Prob Half-sister         Bone Cancer     Heart Attack Half-brother         2003    Heart Attack Half-brother         1980's        Allergies   Allergen Reactions    Zocor [Simvastatin] Myalgia        Prior to Admission medications    Medication Sig Start Date End Date Taking?  Authorizing Provider   mirabegron ER (Myrbetriq) 50 mg ER tablet Take 50 mg by mouth daily. Yes Provider, Historical   cholecalciferol (VITAMIN D3) (1000 Units /25 mcg) tablet Take 2 Tablets by mouth daily. 7/8/21  Yes Millie Alfredo MD   minocycline (DYNACIN) 100 mg tablet TAKE 1 TABLET DAILY FOR    ROSACEA 7/8/21  Yes Millie Alfredo MD   atorvastatin (LIPITOR) 20 mg tablet TAKE 1 TABLET DAILY 4/19/21  Yes Millie Alfredo MD   Synthroid 100 mcg tablet TAKE 1 TABLET EVERY MORNING 4/4/21  Yes Rain Del Castillo NP   benazepril-hydroCHLOROthiazide (LOTENSIN HCT) 10-12.5 mg per tablet TAKE 1 TABLET DAILY FOR    BLOOD PRESSURE 1/16/21  Yes Yolande Rodriguez NP   Xyosted 75 mg/0.5 mL atIn INJECT 0.5ML INTO THE SKIN ONCE A WEEK 11/30/20  Yes Provider, Historical   latanoprost (XALATAN) 0.005 % ophthalmic solution Administer 1 Drop to both eyes nightly. Yes Provider, Historical   clopidogreL (PLAVIX) 75 mg tab TAKE 1 TABLET ONCE DAILY 4/4/21   Rain Del Castillo NP   tadalafil (CIALIS) 5 mg tablet Take 5 mg by mouth daily. Provider, Historical       Review of Systems:  (bold if positive, if negative)    Gen:  fatigueEyes:  ENT:  CVS:  chest pain,Pulm:  GI:  Abdominal pain, nausea,:  MS:  Skin:  Psych:  Endo:  Hem:  Renal:  Neuro:            Objective:      VITALS:    Vital signs reviewed; most recent are:    Visit Vitals  BP (!) 132/55   Pulse (!) 57   Temp 98.3 °F (36.8 °C)   Resp 19   SpO2 98%     SpO2 Readings from Last 6 Encounters:   07/20/21 98%   07/08/21 98%   02/17/21 98%   01/18/21 99%   01/22/20 98%   11/25/20 99%        No intake or output data in the 24 hours ending 07/20/21 0507         Exam:     Physical Exam:    Gen:  Well-developed, well-nourished, in no acute distress. pleasamt  HEENT:  No scleral icterus,  hearing intact to voice, moist mucous membranes  Neck:  Supple, without masses  Resp:  No accessory muscle use. CTAB without wheezing, rales, rhonchi  Card: RRR.  Normal S1 and S2 without murmurs, rubs, or gallops. No peripheral lower extremity edema. No JVD. Peripheral pulses in tact. No carotid bruits  Abd:  Normoactive bowel sounds. Soft, non-tender, non-distended. No rebound, no guarding. No appreciable hepatosplenomegaly   Lymph:  No cervical adenopathy  Musc:  No cyanosis or clubbing  Skin:  No rashes or ulcers; turgor intact  Neuro:  Cranial nerves are grossly intact, no focal motor weakness, follows commands appropriately  Psych:  Good insight, normal affect. Alert, oriented x 3. Answers questions appropriately       Labs:    Recent Labs     07/20/21  0323   WBC 6.0   HGB 14.4   HCT 43.5        Recent Labs     07/20/21  0323      K 4.1   *   CO2 26   *   BUN 25*   CREA 1.17   CA 8.5   MG 2.0   ALB 3.6   ALT 21     No components found for: GLPOC  No results for input(s): PH, PCO2, PO2, HCO3, FIO2 in the last 72 hours. No results for input(s): INR, INREXT in the last 72 hours. No results found for: SDES  No results found for: CULT  All other current labs reviewed in the computer. Imaging/Studies:    XR CHEST PA LAT    Result Date: 7/20/2021  No acute cardiopulmonary process    Imaging personally reviewed.     EKG: NSR, Q waves V1 V2  EKG personally reviewed    ___________________________________________________    Attending Physician: Leigh Keane MD

## 2021-07-20 NOTE — PROGRESS NOTES
[facility]: MED RECONCILIATION    Comments/Recommendations:   Patient alert and oriented for medication reconciliation and had medication list to review. Patient took all medications yesterday at home. Patient started taking minocycline 50mg (1/2 tab) ~ on 7/10/21 due to discussion with outpatient MD and concerns of resistance since patient has been on medication for a long time. Clopidogrel has been on hold for cataract surgery scheduled for today 7/20/21, last dose 7/8/21. Confirmed allergies and preferred pharmacy as [facility]. Medications added:     none    Medications removed:    none    Medications adjusted:    Minocycline 50mg daily adjusted from 100mg daily-see above    Information obtained from: patient, Rx query, medication list     Allergies: Zocor [simvastatin]    Prior to Admission Medications:     Medication Documentation Review Audit       Reviewed by Geovani Newman (Pharmacist) on 07/20/21 at 0839      Medication Sig Documenting Provider Last Dose Status Taking?   atorvastatin (LIPITOR) 20 mg tablet TAKE 1 TABLET DAILY Irma Shrestha MD 7/19/2021 Active Yes   benazepril-hydroCHLOROthiazide (LOTENSIN HCT) 10-12.5 mg per tablet TAKE 1 TABLET DAILY FOR    BLOOD PRESSURE Lenard Bach NP 7/19/2021 Active Yes   cholecalciferol (VITAMIN D3) (1000 Units /25 mcg) tablet Take 2 Tablets by mouth daily. Irma Shrestha MD 7/19/2021 Active Yes   clopidogreL (PLAVIX) 75 mg tab TAKE 1 TABLET ONCE DAILY Valeria Alvarez NP 7/8/2021 Active Yes           Med Note (GEOVANI SANTAMARIA Jul 20, 2021  8:36 AM) Medication has been on hold for cataract surgery that was scheduled for today 7/20/21, last dose 7/8/21. latanoprost (XALATAN) 0.005 % ophthalmic solution Administer 1 Drop to both eyes nightly. Provider, Historical 7/19/2021 Active Yes   minocycline (DYNACIN) 100 mg tablet Take 50 mg by mouth daily.  Indications: rosacea Provider, Historical 7/19/2021 Active Yes Med Note (GEOVANI SANTAMARIA Jul 20, 2021  8:37 AM) Patient started taking 50mg (1/2 tab) ~ on 7/10/21 due to discussion with outpatient MD and concerns of resistance since patient has been on medication for a long time. mirabegron ER (Myrbetriq) 50 mg ER tablet Take 50 mg by mouth daily. Provider, Historical 7/19/2021 Active Yes   Synthroid 100 mcg tablet TAKE 1 TABLET EVERY MORNING Moi Webster NP 7/19/2021 Active Yes   tadalafil (CIALIS) 5 mg tablet Take 5 mg by mouth daily. Provider, Historical 7/19/2021 Active Yes   testosterone enanthate (Xyosted) 75 mg/0.5 mL atIn 0.5 mL by SubCUTAneous route Every Saturday.  Provider, Historical 7/17/2021 Active Yes                    Thank You,   Geovani Santamaria, PharmD, BCPS   Contact: [phone]

## 2021-07-20 NOTE — ED NOTES
Pt given discharge instructions per provider and verbalized understanding. Pt ambulatory from ED to home with wife as  of vehicle.

## 2021-07-20 NOTE — ED NOTES
Patient AAOX4 and in no distress. Patient with even, unlabored respirations. Patient denies any chest pain or shortness of breath. Patient ambulated to the bathroom with a steady gait; denies any symptoms. Patients labs drawn as ordered.  Vitals remain WNL

## 2021-07-21 LAB
ATRIAL RATE: 64 BPM
CALCULATED P AXIS, ECG09: 60 DEGREES
CALCULATED R AXIS, ECG10: 6 DEGREES
CALCULATED T AXIS, ECG11: 24 DEGREES
DIAGNOSIS, 93000: NORMAL
P-R INTERVAL, ECG05: 186 MS
Q-T INTERVAL, ECG07: 370 MS
QRS DURATION, ECG06: 92 MS
QTC CALCULATION (BEZET), ECG08: 381 MS
VENTRICULAR RATE, ECG03: 64 BPM

## 2021-07-23 ENCOUNTER — OFFICE VISIT (OUTPATIENT)
Dept: FAMILY MEDICINE CLINIC | Age: 71
End: 2021-07-23
Payer: COMMERCIAL

## 2021-07-23 VITALS
BODY MASS INDEX: 33.8 KG/M2 | OXYGEN SATURATION: 97 % | WEIGHT: 223 LBS | DIASTOLIC BLOOD PRESSURE: 78 MMHG | SYSTOLIC BLOOD PRESSURE: 130 MMHG | TEMPERATURE: 97.5 F | HEART RATE: 76 BPM | HEIGHT: 68 IN

## 2021-07-23 DIAGNOSIS — Z01.818 PREOP EXAMINATION: Primary | ICD-10-CM

## 2021-07-23 DIAGNOSIS — R19.5 LOOSE STOOLS: ICD-10-CM

## 2021-07-23 DIAGNOSIS — L71.9 ROSACEA: ICD-10-CM

## 2021-07-23 PROCEDURE — 99214 OFFICE O/P EST MOD 30 MIN: CPT | Performed by: STUDENT IN AN ORGANIZED HEALTH CARE EDUCATION/TRAINING PROGRAM

## 2021-07-23 RX ORDER — METRONIDAZOLE 7.5 MG/G
GEL TOPICAL 2 TIMES DAILY
Qty: 60 G | Refills: 0 | Status: SHIPPED | OUTPATIENT
Start: 2021-07-23 | End: 2021-09-25

## 2021-07-23 NOTE — PROGRESS NOTES
Subjective:     Chief Complaint   Patient presents with   [facility] ED Follow-up     [facility] ED 7/19/21     HPI:  Luís Kurtz is a 72 y.o. male. Who presents with multiple complaints today. He has upcoming cataract surgery. Had originally seen him a couple weeks ago, and approved him for surgery. After that he had an episode of epigastric pain was sent to the hospital had a chest pain work-up. Troponins and EKG were negative. Cardiology stated was likely noncardiac so stress test was not done, and he was advised to follow-up in the office. Advised the patient today to go ahead and see cardiology first before before being cleared for surgery. Patient has history of rosacea. On minocycline 100 mg daily. He is okay with trying topical medicine. He may have been on MetroGel in the past but is unsure. Patient has history of loose stools. Has to chronically drink antidiarrhea medication due to this. Did have colonoscopy done about 3 years ago which showed 2 polyps. He has cut dairy out and has helped his symptoms some but continues to have these loose stools. 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 When not taking antidiarrhea medication he is at home he may have a couple loose stools a day. States that it is not  diarrhea, sometimes they are watery. Past Medical History:   Diagnosis Date    Benign prostatic hyperplasia 1/15/2021    Blood glucose abnormal 1/15/2021    Essential hypertension 1/15/2021    Hypogonadism in male 1/15/2021    Mixed hyperlipidemia 1/15/2021    Other specified hypothyroidism 1/15/2021    Palpitations     Prediabetes 1/15/2021    Rosacea 1/15/2021    TIA (transient ischemic attack)     Per patient. Negative imgaing.      Family History   Problem Relation Age of Onset    Heart Disease Mother         had complications after heart valve surgery     Bleeding Prob Half-brother         Brain Tumor     Bleeding Prob Half-sister         Bone Cancer     Heart Attack Half-brother         2003    Heart Attack Half-brother         18's     Social History     Socioeconomic History    Marital status:      Spouse name: Not on file    Number of children: Not on file    Years of education: Not on file    Highest education level: Not on file   Occupational History    Not on file   Tobacco Use    Smoking status: Never Smoker    Smokeless tobacco: Never Used   Vaping Use    Vaping Use: Never used   Substance and Sexual Activity    Alcohol use: Never    Drug use: Never    Sexual activity: Not on file   Other Topics Concern    Not on file   Social History Narrative    Not on file     Social Determinants of Health     Financial Resource Strain:     Difficulty of Paying Living Expenses:    Food Insecurity:     Worried About Running Out of Food in the Last Year:     [address] in the Last Year:    Transportation Needs:     Lack of Transportation (Medical):  Lack of Transportation (Non-Medical):    Physical Activity:     Days of Exercise per Week:     Minutes of Exercise per Session:    Stress:     Feeling of Stress :    Social Connections:     Frequency of Communication with Friends and Family:     Frequency of Social Gatherings with Friends and Family:     Attends Shinto Services:     Active Member of Clubs or Organizations:     Attends Club or Organization Meetings:     Marital Status:    Intimate Partner Violence:     Fear of Current or Ex-Partner:     Emotionally Abused:     Physically Abused:     Sexually Abused:      Current Outpatient Medications on File Prior to Visit   Medication Sig Dispense Refill    minocycline (DYNACIN) 100 mg tablet Take 50 mg by mouth daily. Indications: rosacea      testosterone enanthate (Xyosted) 75 mg/0.5 mL atIn 0.5 mL by SubCUTAneous route Every Saturday.  mirabegron ER (Myrbetriq) 50 mg ER tablet Take 50 mg by mouth daily.       cholecalciferol (VITAMIN D3) (1000 Units /25 mcg) tablet Take 2 Tablets by mouth daily. 180 Tablet 2    atorvastatin (LIPITOR) 20 mg tablet TAKE 1 TABLET DAILY 90 Tab 3    Synthroid 100 mcg tablet TAKE 1 TABLET EVERY MORNING 90 Tab 3    clopidogreL (PLAVIX) 75 mg tab TAKE 1 TABLET ONCE DAILY 90 Tab 3    benazepril-hydroCHLOROthiazide (LOTENSIN HCT) 10-12.5 mg per tablet TAKE 1 TABLET DAILY FOR    BLOOD PRESSURE 90 Tab 3    latanoprost (XALATAN) 0.005 % ophthalmic solution Administer 1 Drop to both eyes nightly.  tadalafil (CIALIS) 5 mg tablet Take 5 mg by mouth daily. No current facility-administered medications on file prior to visit. Allergies   Allergen Reactions    Zocor [Simvastatin] Myalgia     ROS      Objective:     Vitals:    07/23/21 0729   BP: 130/78   Pulse: 76   Temp: 97.5 °F (36.4 °C)   TempSrc: Temporal   SpO2: 97%   Weight: 223 lb (101.2 kg)   Height: 5' 8\" (1.727 m)     Physical Exam  Vitals reviewed. HENT:      Head: Normocephalic and atraumatic. Cardiovascular:      Rate and Rhythm: Normal rate and regular rhythm. Pulmonary:      Effort: Pulmonary effort is normal.   Skin:     Comments: Mild redness seen over nose. No papules or pustules. Neurological:      Mental Status: He is oriented to person, place, and time. Psychiatric:         Behavior: Behavior normal.            Assessment/Plan:       1. Preop examination     ED follow-up/chest pain  -Recently had epigastric pain, was admitted for chest pain rule out. ECG and troponin were unremarkable. Cardiology consulted and they felt it was likely noncardiac. Recommendation at that time was to follow-up with cardiology in the office. Advised to follow-up and see cardiology for preop clearance. I will see patient in the office after he sees cardiology. 2. Rosacea        -      Advised to stop minocycline with time MetroGel.   MetroGel is ineffective we will try topical minocycline, and if that is ineffective we will try subantimicrobial dosing of doxycycline.  -     metroNIDAZOLE (METROGEL) 0.75 % topical gel; Apply  to affected area two (2) times a day. 3 loose stools  -We will be getting off of chronic antibiotic for now. Can offer probiotics next visit. Consider GI consult in the future. Follow-Up:  Follow-up and Dispositions    · Return in about 1 month (around 8/23/2021) for Rosacea.           Shabbir James MD

## 2021-07-23 NOTE — PROGRESS NOTES
Chief Complaint   Patient presents with   [place] [facility] [address] [facility] ED 7/19/21     1. Have you been to the ER, urgent care clinic since your last visit? Hospitalized since your last visit? Yes When: 7/19/21 Where: [address] [place] [address] Reason for visit: Chest Pressure    2. Have you seen or consulted any other health care providers outside of the [address] [place] [address] since your last visit? Include any pap smears or colon screening.  No

## 2021-07-27 ENCOUNTER — OFFICE VISIT (OUTPATIENT)
Dept: CARDIOLOGY CLINIC | Age: 71
End: 2021-07-27
Payer: COMMERCIAL

## 2021-07-27 VITALS
DIASTOLIC BLOOD PRESSURE: 60 MMHG | HEART RATE: 64 BPM | WEIGHT: 221 LBS | HEIGHT: 68 IN | SYSTOLIC BLOOD PRESSURE: 126 MMHG | OXYGEN SATURATION: 100 % | BODY MASS INDEX: 33.49 KG/M2

## 2021-07-27 DIAGNOSIS — Z99.89 OSA ON CPAP: ICD-10-CM

## 2021-07-27 DIAGNOSIS — R00.2 PALPITATIONS: Primary | ICD-10-CM

## 2021-07-27 DIAGNOSIS — I10 ESSENTIAL HYPERTENSION: ICD-10-CM

## 2021-07-27 DIAGNOSIS — R60.0 BILATERAL LEG EDEMA: ICD-10-CM

## 2021-07-27 DIAGNOSIS — E78.2 MIXED HYPERLIPIDEMIA: Primary | ICD-10-CM

## 2021-07-27 DIAGNOSIS — Z01.810 PREOP CARDIOVASCULAR EXAM: ICD-10-CM

## 2021-07-27 DIAGNOSIS — G47.33 OSA ON CPAP: ICD-10-CM

## 2021-07-27 PROCEDURE — 99214 OFFICE O/P EST MOD 30 MIN: CPT | Performed by: SPECIALIST

## 2021-07-27 NOTE — PROGRESS NOTES
CARDIOLOGY OFFICE NOTE    Vicente Alaniz MD, [address], [place], [address] [facility]  Phone [phone]; Fax [phone]  Mobile [phone]   Voice Mail [phone]    LAST OFFICE VISIT : Visit date not found  Dayanna Campbell MD       ATTENTION:   This medical record was transcribed using an electronic medical records/speech recognition system. Although proofread, it may and can contain electronic, spelling and other errors. Corrections may be executed at a later time. Please feel free to contact us for any clarifications as needed. ICD-10-CM ICD-9-CM   1. Palpitations  R00.2 785.1   2. Essential hypertension  I10 401.9   3. LEO on CPAP  G47.33 327.23    Z99.89 V46.8   4. Bilateral leg edema  R60.0 782.3   5. Preop cardiovascular exam  Z01.810 V72.81            Yolette Rodrigues is a 72 y.o. male with  referred for palpitations and lower extremity edema   . Cardiac risk factors: dyslipidemia, HTN, male gender  I have personally obtained the history from the patient. HISTORY OF PRESENTING ILLNESS    Yolette Rodrigues is a 72 y.o. male. He was seen in the emergency room with chest discomfort. He had some epigastric discomfort and his chest pain work-up was negative. Troponins and EKGs were negative. Does have a history of dyslipidemia, LEO, and hypertension. He is doing well and he states that the episode occurred after he ate a late meal and it was a hamburger at night.   Did have a slight case of diarrhea so I think the symptoms he has were related to a GI event.            ACTIVE PROBLEM LIST     Patient Active Problem List    Diagnosis Date Noted    Chest pain 07/20/2021    LEO (obstructive sleep apnea) 07/20/2021    TIA (transient ischemic attack)     Cervical spondylosis with radiculopathy 03/21/2021    DDD (degenerative disc disease), cervical 03/21/2021    Other spondylosis with radiculopathy, lumbar region 03/21/2021    Neuropathy 01/18/2021    Benign prostatic hyperplasia 01/15/2021    Essential hypertension 01/15/2021    Hypogonadism in male 01/15/2021    Other specified hypothyroidism 01/15/2021    Mixed hyperlipidemia 01/15/2021    Prediabetes 01/15/2021    Rosacea 01/15/2021    Severe obesity ([address] 09/10/2019           PAST MEDICAL HISTORY     Past Medical History:   Diagnosis Date    Benign prostatic hyperplasia 1/15/2021    Blood glucose abnormal 1/15/2021    Essential hypertension 1/15/2021    Hypogonadism in male 1/15/2021    Mixed hyperlipidemia 1/15/2021    Other specified hypothyroidism 1/15/2021    Palpitations     Prediabetes 1/15/2021    Rosacea 1/15/2021    TIA (transient ischemic attack)     Per patient. Negative imgaing.            PAST SURGICAL HISTORY     Past Surgical History:   Procedure Laterality Date    HX CIRCUMCISION  2001    HX COLONOSCOPY  07/13/2018    2 adenomatous polyps removed -Dr. Brittany Shepherd HX COLONOSCOPY  01/01/2012    HX CYST REMOVAL  1963    pilonidal cyst surgery    HX HERNIA REPAIR      HX KNEE REPLACEMENT Bilateral     HX ORTHOPAEDIC      Neuroma on foot removed     HX VASECTOMY      HX VEIN STRIPPING  12/2008    Dr. Roberto Abrams Zocor [Simvastatin] Myalgia          FAMILY HISTORY     Family History   Problem Relation Age of Onset    Heart Disease Mother         had complications after heart valve surgery     Bleeding Prob Half-brother         Brain Tumor     Bleeding Prob Half-sister         Bone Cancer     Heart Attack Half-brother         2003    Heart Attack Half-brother         1980's    negative for cardiac disease       SOCIAL HISTORY     Social History     Socioeconomic History    Marital status:      Spouse name: Not on file    Number of children: Not on file    Years of education: Not on file    Highest education level: Not on file   Tobacco Use    Smoking status: Never Smoker    Smokeless tobacco: Never Used   Vaping Use    Vaping Use: Never used   Substance and Sexual Activity    Alcohol use: Never    Drug use: Never     Social Determinants of Health     Financial Resource Strain:     Difficulty of Paying Living Expenses:    Food Insecurity:     Worried About Running Out of Food in the Last Year:     [address] in the Last Year:    Transportation Needs:     Lack of Transportation (Medical):  Lack of Transportation (Non-Medical):    Physical Activity:     Days of Exercise per Week:     Minutes of Exercise per Session:    Stress:     Feeling of Stress :    Social Connections:     Frequency of Communication with Friends and Family:     Frequency of Social Gatherings with Friends and Family:     Attends Synagogue Services:     Active Member of Clubs or Organizations:     Attends Club or Organization Meetings:     Marital Status:          MEDICATIONS     Current Outpatient Medications   Medication Sig    metroNIDAZOLE (METROGEL) 0.75 % topical gel Apply  to affected area two (2) times a day.  testosterone enanthate (Xyosted) 75 mg/0.5 mL atIn 0.5 mL by SubCUTAneous route Every Saturday.  mirabegron ER (Myrbetriq) 50 mg ER tablet Take 50 mg by mouth daily.  cholecalciferol (VITAMIN D3) (1000 Units /25 mcg) tablet Take 2 Tablets by mouth daily.  atorvastatin (LIPITOR) 20 mg tablet TAKE 1 TABLET DAILY    Synthroid 100 mcg tablet TAKE 1 TABLET EVERY MORNING    clopidogreL (PLAVIX) 75 mg tab TAKE 1 TABLET ONCE DAILY    benazepril-hydroCHLOROthiazide (LOTENSIN HCT) 10-12.5 mg per tablet TAKE 1 TABLET DAILY FOR    BLOOD PRESSURE    latanoprost (XALATAN) 0.005 % ophthalmic solution Administer 1 Drop to both eyes nightly.  tadalafil (CIALIS) 5 mg tablet Take 5 mg by mouth daily.  minocycline (DYNACIN) 100 mg tablet Take 50 mg by mouth daily. Indications: rosacea     No current facility-administered medications for this visit.        I have reviewed the nurses notes, vitals, problem list, allergy list, medical history, family, social history and medications. REVIEW OF SYMPTOMS   As per HPI  General: Pt denies excessive weight gain or loss. Pt is able to conduct ADL's  HEENT: Denies blurred vision, headaches, hearing loss, epistaxis and difficulty swallowing. Respiratory: Denies cough, congestion, shortness of breath, SEGOVIA, wheezing or stridor. Cardiovascular: Denies precordial pain, palpitations, edema or PND  Gastrointestinal: Denies poor appetite, indigestion, abdominal pain or blood in stool  Genitourinary: Denies hematuria, dysuria, increased urinary frequency  Musculoskeletal: Denies joint pain or swelling from muscles or joints  Neurologic: Denies tremor, paresthesias, headache, or sensory motor disturbance  Psychiatric: Denies confusion, insomnia, depression  Integumentray: Denies rash, itching or ulcers. Hematologic: Denies easy bruising, bleeding     PHYSICAL EXAMINATION      Vitals:    07/27/21 0857   BP: 126/60   Pulse: 64   SpO2: 100%   Weight: 221 lb (100.2 kg)   Height: 5' 8\" (1.727 m)     General: Well developed, in no acute distress. HEENT: No jaundice, oral mucosa moist, no oral ulcers  Neck: Supple, no stiffness, no lymphadenopathy, supple  Heart:  Normal S1/S2 negative S3 or S4. Regular, no murmur, gallop or rub, no jugular venous distention  Respiratory: Clear bilaterally x 4, no wheezing or rales  Extremities: Wearing compression hose, normal cap refill, no cyanosis. Musculoskeletal: No clubbing, no deformities  Neuro: A&Ox3, speech clear, gait stable, cooperative, no focal neurologic deficits  Skin: Skin color is normal. No rashes or lesions. Non diaphoretic, moist.             DIAGNOSTIC DATA     1. Echo   10/21/19-EF 60%, LAE, AV sclerosis, Mild AI/MR/TR/PI, Pulmonary arterial systolic pressure is 69.4 mmHg. 7/20/21-EF 60 - 65%, LAE    2. Stress Test   10/21/19-Cardiolite- normal, mets 10.1     3.  Lipids   9/14/19- TC 97, HDL 31, LDL 43, TG 117   1/23/20- , HDL 40, LDL 53,    7/20/21- , HDL 40, LDL 55.2,      4. LE Duplex   11/13/19-No evidence of deep vein thrombosis or venous obstruction within the lower extremities bilaterally     5. Loop   9/29-10/28/19- SR with PVC's, average HR 64, min 43, max 143         LABORATORY DATA            Lab Results   Component Value Date/Time    WBC 6.0 07/20/2021 03:23 AM    HGB 14.4 07/20/2021 03:23 AM    HCT 43.5 07/20/2021 03:23 AM    PLATELET 456 13/22/7529 03:23 AM    MCV 98.9 07/20/2021 03:23 AM      Lab Results   Component Value Date/Time    Sodium 141 07/20/2021 03:23 AM    Potassium 4.1 07/20/2021 03:23 AM    Chloride 111 (H) 07/20/2021 03:23 AM    CO2 26 07/20/2021 03:23 AM    Anion gap 4 (L) 07/20/2021 03:23 AM    Glucose 101 (H) 07/20/2021 03:23 AM    BUN 25 (H) 07/20/2021 03:23 AM    Creatinine 1.17 07/20/2021 03:23 AM    BUN/Creatinine ratio 21 (H) 07/20/2021 03:23 AM    GFR est AA >60 07/20/2021 03:23 AM    GFR est non-AA >60 07/20/2021 03:23 AM    Calcium 8.5 07/20/2021 03:23 AM    Bilirubin, total 0.5 07/20/2021 03:23 AM    Alk. phosphatase 75 07/20/2021 03:23 AM    Protein, total 6.4 07/20/2021 03:23 AM    Albumin 3.6 07/20/2021 03:23 AM    Globulin 2.8 07/20/2021 03:23 AM    A-G Ratio 1.3 07/20/2021 03:23 AM    ALT (SGPT) 21 07/20/2021 03:23 AM           PLAN        1. HTN  - Blood pressure is excellent today no adjustments in antihypertensives     2. Dyslipidemia  -LDL is excellent in the 50 range     . LOE on CPAP  -Is compliant and uses on a regular basis      3. Possible TIA  -On Plavix    4. Lower extremity edema  -Edema has improved significantly wears with compression hose regularly    5. Cardiac preop risk assessment  -From my standpoint the recent event that he had which was nausea and diaphoresis and a negative cardiac work-up he should go forward with surgery and is it is a low risk procedure.   The cardiac testing is indicated     Follow-up with me in 6 months or PRN    We discussed the expected course, resolution and complications of the diagnosis(es) in detail. Medication risks, benefits, costs, interactions, and alternatives were discussed as indicated. I advised him to contact the office if his condition worsens, changes or fails to improve as anticipated. He expressed understanding with the diagnosis(es) and plan    No orders of the defined types were placed in this encounter. We discussed the expected course, resolution and complications of the diagnosis(es) in detail. Medication risks, benefits, costs, interactions, and alternatives were discussed as indicated. I advised him to contact the office if his condition worsens, changes or fails to improve as anticipated. He expressed understanding with the diagnosis(es) and plan        Follow-up and Dispositions  ·   Return in about 6 months (around 1/27/2022). I have discussed the diagnosis with  Bety Kim and the intended plan as seen in the above orders. Questions were answered concerning future plans. I have discussed medication side effects and warnings with the patient as well. Thank you,  Rosalinda Mandujano MD for involving me in the care of  Bety Kim. Please do not hesitate to contact me for further questions/concerns. Vicente Khalil MD, [address]      [facility], [address] [place] [address] Drive      [phone] / [phone] Fax

## 2021-07-27 NOTE — PROGRESS NOTES
Room 2    Pre-op for Cataract procedure   Aug. 5  Dr. Mckeon [facility])   [phone] FAX    Visit Vitals  /60 (BP 1 Location: Left upper arm, BP Patient Position: Sitting, BP Cuff Size: Large adult)   Pulse 64   Ht 5' 8\" (1.727 m)   Wt 221 lb (100.2 kg)   SpO2 100%   BMI 33.60 kg/m²         Chest pain: no  Shortness of breath: no  Edema: no  Palpitations: no  Dizziness: no    New diagnosis/Surgeries: no    ER/Hospitalizations: ER 7-20 Sweating, CP     Refills: no

## 2021-07-27 NOTE — PATIENT INSTRUCTIONS
1) From my standpoint you are okay to proceed with surgery and I consider you a low operative risk for a low risk procedure    2) please get your cholesterol checked again prior to seeing me in the next 6 months    3) should you have any of this discomfort with activity and increased sweating please call me and I will arrange for stress test.

## 2021-07-28 ENCOUNTER — TELEPHONE (OUTPATIENT)
Dept: FAMILY MEDICINE CLINIC | Age: 71
End: 2021-07-28

## 2021-07-28 NOTE — TELEPHONE ENCOUNTER
Patient called and wanted to let you know he saw the cardiologist 7/27/2021 and wanted to see if Dr. Apolonio Nyhan would fill out the paperwork for cataract surgery, he can be reached at [phone]

## 2021-08-03 ENCOUNTER — HOSPITAL ENCOUNTER (OUTPATIENT)
Dept: PHYSICAL THERAPY | Age: 71
Discharge: HOME | End: 2021-08-03
Payer: COMMERCIAL

## 2021-08-03 PROCEDURE — 97161 PT EVAL LOW COMPLEX 20 MIN: CPT

## 2021-08-03 PROCEDURE — 97140 MANUAL THERAPY 1/> REGIONS: CPT

## 2021-08-03 NOTE — PROGRESS NOTES
[facility]  Lymphedema Clinic and [facility] Rehabilitation  [address] [place] [address].  [address]  [address]      INITIAL EVALUATION    NAME: Cathy Maria AGE: 72 y.o. GENDER: male  DATE: 08/03/21  REFERRING PHYSICIAN: Mckinley Bull MD (cardiology)  HISTORY AND BACKGROUND:  Pt is a 73 yo male returning today for 6 month re-evaluation of bilateral LE secondary lymphedema. Pt continues to wear Juzo 3512 knee high stockings every day from morning until evening. Pt says he is able to don/doff stockings independently using medi donning cerna and dycem. Pt has upcoming cataract surgery planned. Pt also reports recent ED visit due to chest pain, workup negative for cardiac event. Pt denies other changes to medical history since last visit. From prior evaluation: Pt reports 20+ year history of fluctuating LE edema, mostly in the foot and ankle, exacerbated by varicose veins in the last 3 years. Pt with history of B TKA and neuroma on right foot. Previous doppler negative for DVT. Primary Diagnosis:  · BLE lymphedema, secondary (I89.0)  Other Treatment Diagnoses:   Swelling not relieved by elevation (R60.0 localized edema,)   Venous insufficiency I87.2;   Date of Onset: reports a 20+ year history of fluctuating LE edema, mostly in the foot and ankle. In the past few years exacerbated by varicose issues  Present Symptoms and Functional Limitations: LE swelling has fluctuations and difficulty with wearing shoes. Reports improvement since initiating stocking wear in July 2020.    Lower Extremity Functional Scale: 13/68, 19% impairment     Past Medical History:   Past Medical History:   Diagnosis Date    Benign prostatic hyperplasia 1/15/2021    Blood glucose abnormal 1/15/2021    Essential hypertension 1/15/2021    Hypogonadism in male 1/15/2021    Mixed hyperlipidemia 1/15/2021    Other specified hypothyroidism 1/15/2021    Palpitations     Prediabetes 1/15/2021    Rosacea 1/15/2021    TIA (transient ischemic attack)     Per patient. Negative imgaing. Past Surgical History:   Procedure Laterality Date    HX CIRCUMCISION  2001    HX COLONOSCOPY  07/13/2018    2 adenomatous polyps removed -Dr. Bertin Soto HX COLONOSCOPY  01/01/2012    HX CYST REMOVAL  1963    pilonidal cyst surgery    HX HERNIA REPAIR      HX KNEE REPLACEMENT Bilateral     HX ORTHOPAEDIC      Neuroma on foot removed     HX VASECTOMY      HX VEIN STRIPPING  12/2008    Dr. Beatrice Love     Current Medications:    Current Outpatient Medications   Medication Sig    metroNIDAZOLE (METROGEL) 0.75 % topical gel Apply  to affected area two (2) times a day.  testosterone enanthate (Xyosted) 75 mg/0.5 mL atIn 0.5 mL by SubCUTAneous route Every Saturday.  mirabegron ER (Myrbetriq) 50 mg ER tablet Take 50 mg by mouth daily.  cholecalciferol (VITAMIN D3) (1000 Units /25 mcg) tablet Take 2 Tablets by mouth daily.  atorvastatin (LIPITOR) 20 mg tablet TAKE 1 TABLET DAILY    Synthroid 100 mcg tablet TAKE 1 TABLET EVERY MORNING    clopidogreL (PLAVIX) 75 mg tab TAKE 1 TABLET ONCE DAILY    benazepril-hydroCHLOROthiazide (LOTENSIN HCT) 10-12.5 mg per tablet TAKE 1 TABLET DAILY FOR    BLOOD PRESSURE    latanoprost (XALATAN) 0.005 % ophthalmic solution Administer 1 Drop to both eyes nightly. No current facility-administered medications for this encounter. Allergies:    Allergies   Allergen Reactions    Zocor [Simvastatin] Myalgia      Prior Level of Function/Social/Work History/Personal factors and/or comorbidities impacting plan of care: still working as a special  for [employer], active with yard work and grandkids   Living Situation: home      Trainable Caregiver?: wife   Self-care/ADLs: independent     Mobility: independent   Sleeping Arrangement:  In a bed   [employer]: none   Other:   Previous Therapy:  Seen at Lymphedema clinic in July 2020, re-eval in Feb 2021. Compression/Lymphedema Equipment:  Cinegifzo 3512 knee high stockings, size III regular, closed toe (4 pair). SUBJECTIVE:   Patient reports good tolerance to knee high stockings and feels swelling is stable. Patients goals for therapy: recheck volumes and obtain new compression garments. OBJECTIVE DATA SUMMARY:   EXAMINATION/PRESENTATION/DECISION MAKING:   Pain:   0/10        Self-Care and ADLs:  Grooming: Independent  Bathing: Independent    UB Dressing: Independent  LB Dressing: Independent    Don/Doff Shoes/Socks: Independent  Toileting: Independent    Other:       Skin and Tissue Assessment:  Dermal Status:  [x]   Intact []  Dry   []  Tenuous [] Flaky   []  Wound/lesion present [x]  Scars: B TKA   []  Dermatitis    Texture/Consistency:  []  Boggy [x]  Pitting Edema: 1+ pitting lower legs, malleolar region    []  Brawny []  Combination   []  Fibrotic/Woody    Pigmentation/Color Change:  []  Normal []  Hemosiderin   []  Red []  Erythematous   [x]  Hyperpigmented: around feet/ankles  []  Hyperlipodermatosclerosis   Anomalies:  []  Lymphorrhea []  Vesicles   []  Petechiae []  Warty Vercusis   []  Bullae []  Papilloma   Circulatory:  []  XENA [x]  Varicosities: varicous veins present   [x]  Pulses palpable  [x]  Vascular studies ruled out DVT in past   []  DVT History    Nails:  [x]   Normal  []   Fungus  Stemmers Sign: negative  Vitals  Blood pressure  138/62  Heart rate (bpm)  64  Oxygen saturation 100%  Height:   5'8\" Weight:   221 lbs  BMI:   33.6  (36 or greater: adversely affecting lymphedema)  Wound/Ulcer:  no      Wound Pain:   none  Volumetric Measurements:   Today 8/3/2021  Right:  3172.44 mL Left:  3101.0 mL   % Difference: 2.25% Dominance: Right   (See scanned graph)    2/23/2021  Right:  1579. 14 mL Left:  3056.21 mL   % Difference: 2.89 Dominance: Right   (See scanned graph)    7/22/2020:  Right:  3535.05 mL Left:  3420.34 mL   % Difference: 3.16 Dominance: Right (See scanned graph)  Range of Motion: WFL  Strength: WFL  Sensation:  Intact  Mobility:  Bed/Chair Mobility:  Independent  Transfers:  Independent    Sitting Balance:  intact Standing Balance:  intact   Gait:  Independent  Wheelchair Mobility:  n/a   Endurance:  good Stairs:  Independent          Safety:  Patient is alert and oriented:  x4   Safety awareness:  yes   Fall Risk?:  no   Patient given written fall prevention handout: Yes   Precautions:  Standard lymphedema precautions to include avoiding blood pressure readings, injections and IVs or other procedures/acts that could lead to broken skin on affected area, and avoiding excessive heat, resistive activity or altitude without compression garment    Physical Therapy Evaluation Charge Determination   History Examination Presentation Decision-Making   MEDIUM  Complexity : 1-2 comorbidities / personal factors will impact the outcome/ POC  LOW Complexity : 1-2 Standardized tests and measures addressing body structure, function, activity limitation and / or participation in recreation  LOW Complexity : Stable, uncomplicated  Other outcome measures LLIS  LOW       Based on the above components, the patient evaluation is determined to be of the following complexity level: LOW     Evaluation Time: 2:10 - 2:30 pm (20 minutes)    TREATMENT PROVIDED:   1. Treatment description:  Manual therapy x 1  Patient with combination edema complicated by vascular insufficiency. Reviewed limb volumes as above and garment measurements. Patient measured for replacement Juzo 3512 CCL2 knee high, beige and black pair, closed toe stockings for bilateral LE, size III regular. Pt asked about open toe, however feel that these may cause irritation due to neuroma. Pt in agreement. DME order sent to vendor. Explained to pt that this may take several weeks to receive stockings due to need for signed eval/LMN from MD and then obtaining insurance authorization.  Pt to fit garments on his own and contact clinic with any issues prior to next 6 month re-evaluation. Treatment time:  2:30 - 2:50 pm  Minutes: 20    ASSESSMENT:   Reagan Gonzales is a 72 y.o. male who presents with Stage II combination edema with venous insufficiency. Patient's limb volumes have reduced since transitioning to Juzo dynamic stockings in July 2020. Patient's leg discrepancy 2.25% and able to be measured for replacement RM garments, Juzo 3512 size III regular, closed toe. DME order sent to vendor. Explained to pt that this may take several weeks to receive stockings due to need for signed eval/LMN from MD and then obtaining insurance authorization. Pt to fit garments on his own since no change in size/style indicated and return to clinic in 6 months for re-evaluation. Patient continues to benefit from aspects of complete decongestive therapy (CDT) including manual lymphatic drainage (MLD) technique, short-stretch compression system to maintain limb volumes, and kinesiotaping as appropriate. Patient will receive instruction in proper skin care to recognize signs/symptoms of and prevent infection, therapeutic exercise, and self-MLD for independent home program and restorative lymphatic performance. This care is medically necessary due to the infection risk with lymphedema, and to improve functional activities. CDT is necessary to resolve swelling to allow patient to return to wearing normal clothes/footwear, and prevent worsening of symptoms, such as venous stasis ulcerations, infections, or hospitalizations. Patient will be independent with home program strategies to allow improved ADL ability and mobility and to allow patient to return to greatest functional independence. PLAN OF CARE:   Recommendations and Planned Interventions:  No f/u visits indicated. Pt to return in 6 months for re-evaluation or sooner as needed. Patient has participated in goal setting and agrees to work toward plan of care.   Patient was instructed to call if questions or concerns arise. Thank you for this referral.  Alonzo Drummond. Sera, PT, DPT, [facility]    Time Calculation: 40 mins    TREATMENT PLAN EFFECTIVE DATES:   8/3/2021 TO 10/26/2021  I have read the above plan of care for [address] - [facility]. I certify the above prescribed services are required by this patient and are medically necessary.   The above plan of care has been developed in conjunction with the lymphedema/physical therapist.       Physician Signature: ____________________________________Date:______________

## 2021-08-04 NOTE — PROGRESS NOTES
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 [x]   I87.2 Venous Insufficiency   [x]   I89.0 Lymphedema, other secondary  []   I83.019 Venous Stasis Ulcer LE, Right   []   I89.9 Unspecified Lymphatic Disorder []   I83.029 Venous Stasis Ulcer LE, Left   [x]   R60.9 Swelling not relieved by elevation []   Q82.0 Hereditary/ Congenital Lymphedema   []   C50.211 Malignant neoplasm of breast, Right []   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   []   C50.212 Malignant neoplasm of breast, Left []   L03.115 LE Cellulitis, Right   []    []   L03.116 LE Cellulitis, Left     Recommended Product for Diagnosis as noted above:  Units Upper Extremity Rt Lt Units Lower Extremity Rt Lt    Compression Multi-Layered Bandages    Compression Multi-Layered Bandages      Circ-Aid, Ready Wrap, Sigvaris Arm    Inelastic binders (Circ-Aid, Farrow)  []Foot   []Below Knee   []Knee   []Thigh      Circ-Aid Ready Wrap, Sigvaris hand    Castillo Miguel A, Leg, night use      Tribute Arm, night use    Tribute Leg, night use      Castillo Miguel A Arm, night use    Olu Sleeve Leg/ Foot, night use      Vasopneumatic Compression Pump  []Arm []Arm w/Shoulder  []Arm w/Vest    Vasopneumatic Compression Pump  []Leg         []Trunk       []Pant      Gradiant Compression Sleeves & Gloves  Custom/ RM Arm:    []CCL1 []CCL2 []CCL3  Custom/ RM Glove: []CCL1 []CCL2 []CCL3     4 Gradient Compression Stockings  Custom/ RM Lower Extremity:   [x]CCL1       [x]CCL2         []CCL3   [x]Knee      []Thigh        []Full Length x x    Olu sleeve arm w/ hand, night use    Tribute Wrap, night use      Compression Bra    Compression Vest          Compression Multi-Layered Bandages     The above patient was referred for treatment of Lymphedema due to the diagnosis indicated above.   Lymphedema is a progressive and chronic condition. It may cause acute infections, muscle atrophy, discomfort, and connective tissue fibrosis with irreversible tissue damage, decreased ROM in the affected limb and diminished functional mobility possibly interfering with independence and ability to work. Recurrent infections and wound complications that commonly occur with Lymphedema often require hospitalization and extensive wound care, thus increasing medical costs. The patient has received complete decongestive therapy which includes manual lymphatic drainage, lymphedema specific exercises, compression bandaging, and hygiene/skin care. Goals of therapy are to reduce the edema and prevent re-accumulation of fluid with its complications. It is medically necessary for this patient to have daytime garments to control this condition. They will need 2 sets (one set to wear and one set to wash for adequate skin care and wearing time). Garments must be replaced every 4-6 months for effectiveness. There are no substitutes available that offer acceptable compression treatment for this Lymphedema patient. If further information is requested, please contact our certified lymphedema therapists at [phone].     [x] Jose Quevedo, PT, DPT, [facility]  [] Timo Dia PT, [facility]  [] Alfie Munson, PT, DPT, [facility]      Printed MD Name  MD Signature  Date

## 2021-09-14 ENCOUNTER — OFFICE VISIT (OUTPATIENT)
Dept: NEUROLOGY | Age: 71
End: 2021-09-14
Payer: COMMERCIAL

## 2021-09-14 VITALS
HEART RATE: 65 BPM | RESPIRATION RATE: 20 BRPM | SYSTOLIC BLOOD PRESSURE: 146 MMHG | DIASTOLIC BLOOD PRESSURE: 80 MMHG | OXYGEN SATURATION: 98 %

## 2021-09-14 DIAGNOSIS — R20.2 PARESTHESIA: ICD-10-CM

## 2021-09-14 DIAGNOSIS — M54.16 LUMBAR RADICULOPATHY: ICD-10-CM

## 2021-09-14 DIAGNOSIS — G45.9 TIA (TRANSIENT ISCHEMIC ATTACK): ICD-10-CM

## 2021-09-14 DIAGNOSIS — M54.12 CERVICAL RADICULOPATHY: Primary | ICD-10-CM

## 2021-09-14 PROCEDURE — 99214 OFFICE O/P EST MOD 30 MIN: CPT | Performed by: PSYCHIATRY & NEUROLOGY

## 2021-09-14 RX ORDER — GABAPENTIN 100 MG/1
CAPSULE ORAL
Qty: 90 CAPSULE | Refills: 2 | Status: SHIPPED | OUTPATIENT
Start: 2021-09-14 | End: 2021-10-21 | Stop reason: SDUPTHER

## 2021-09-14 NOTE — PROGRESS NOTES
NEUROLOGY CLINIC NOTE    Patient ID:  Dolores Paula  [id number]  89 y.o.  1950    Date of Visit:  September 14, 2021    Referring Physician: Dr. Karla Maradiaga    Reason for Visit:  Hand and feet cold sensation  Chief Complaint   Patient presents with    Follow-up       History of Present Illness:     Patient Active Problem List    Diagnosis Date Noted    Chest pain 07/20/2021    LEO (obstructive sleep apnea) 07/20/2021    TIA (transient ischemic attack)     Cervical spondylosis with radiculopathy 03/21/2021    DDD (degenerative disc disease), cervical 03/21/2021    Other spondylosis with radiculopathy, lumbar region 03/21/2021    Neuropathy 01/18/2021    Benign prostatic hyperplasia 01/15/2021    Essential hypertension 01/15/2021    Hypogonadism in male 01/15/2021    Other specified hypothyroidism 01/15/2021    Mixed hyperlipidemia 01/15/2021    Prediabetes 01/15/2021    Rosacea 01/15/2021    Severe obesity ([address] 09/10/2019     Past Medical History:   Diagnosis Date    Benign prostatic hyperplasia 1/15/2021    Blood glucose abnormal 1/15/2021    Essential hypertension 1/15/2021    Hypogonadism in male 1/15/2021    Mixed hyperlipidemia 1/15/2021    Other specified hypothyroidism 1/15/2021    Palpitations     Prediabetes 1/15/2021    Rosacea 1/15/2021    TIA (transient ischemic attack)     Per patient. Negative imgaing. Past Surgical History:   Procedure Laterality Date    HX CIRCUMCISION  2001    HX COLONOSCOPY  07/13/2018    2 adenomatous polyps removed -Dr. Demetrius Gorman HX COLONOSCOPY  01/01/2012    HX CYST REMOVAL  1963    pilonidal cyst surgery    HX HERNIA REPAIR      HX KNEE REPLACEMENT Bilateral     HX ORTHOPAEDIC      Neuroma on foot removed     HX VASECTOMY      HX VEIN STRIPPING  12/2008    Dr. Luis Miguel Oquendo      Prior to Admission medications    Medication Sig Start Date End Date Taking?  Authorizing Provider   metroNIDAZOLE (METROGEL) 0.75 % topical gel Apply  to affected area two (2) times a day. 7/23/21  Yes Parisa Chavez MD   testosterone enanthate Edmondson Vicki) 75 mg/0.5 mL atIn 0.5 mL by SubCUTAneous route Every Saturday. Yes Provider, Historical   mirabegron ER (Myrbetriq) 50 mg ER tablet Take 50 mg by mouth daily. Yes Provider, Historical   cholecalciferol (VITAMIN D3) (1000 Units /25 mcg) tablet Take 2 Tablets by mouth daily. 7/8/21  Yes Parisa Chavez MD   atorvastatin (LIPITOR) 20 mg tablet TAKE 1 TABLET DAILY 4/19/21  Yes Parisa Chavez MD   Synthroid 100 mcg tablet TAKE 1 TABLET EVERY MORNING 4/4/21  Yes Daysi Thomas NP   clopidogreL (PLAVIX) 75 mg tab TAKE 1 TABLET ONCE DAILY 4/4/21  Yes Daysi Thomas NP   benazepril-hydroCHLOROthiazide (LOTENSIN HCT) 10-12.5 mg per tablet TAKE 1 TABLET DAILY FOR    BLOOD PRESSURE 1/16/21  Yes Kamryn Lucero NP   latanoprost (XALATAN) 0.005 % ophthalmic solution Administer 1 Drop to both eyes nightly. Yes Provider, Historical     Allergies   Allergen Reactions    Zocor [Simvastatin] Myalgia      Social History     Tobacco Use    Smoking status: Never Smoker    Smokeless tobacco: Never Used   Substance Use Topics    Alcohol use: Never      Family History   Problem Relation Age of Onset    Heart Disease Mother         had complications after heart valve surgery     Bleeding Prob Half-brother         Brain Tumor     Bleeding Prob Half-sister         Bone Cancer     Heart Attack Half-brother         2003    Heart Attack Half-brother         1980's        Subjective:      Raúl Ledezma is a 80 y.o. RHWM with history of BPH, HTN, hypogonadism, hyperlipidemia, thyroid disorder, prediabetes and TIA who was referred here by Dr. Anderson  for further evaluation of hand and feet cold sensation. Patient is here for follow up. Noticeable for the past 2 years but longer than that.    Coldness in the fingers and toes - mid morning or early evening in the winter  Bluish discoloration  Not much during the summertime   Feels unstable. No falls. No weakness or loss of muscle bulk. Issues with ankle swelling better with compression stockings  Some discomfort but no pain  No sense of weakness in the hands or dropping things. 6 months sleeps with the arms up to prevent numbness and tingling. Resting elbow on armrest causes numbness and tingling bilateral 4th and 5th fingers    Teacher at [employer]    Denies any significant alcohol use. History of TIA. Placed on Aggrenox but due to nosebleeds and shifted to clopidrogel 75 mg daily    Patient was seen by his PCP last 1/18/2021. Note mentions pins and needle sensation and tingling in the feet. Less often in the hands. Also notes discoloration of the fingers. Labs done were unremarkable (CBC, CMP, B12, folate, magnesium, lipid panel, TSH and hgbA1c) except for extremely low vitamin D levels. Since last visit, patient underwent EMG/NCS of bilateral lower extremity 3/2/2021 which revealed mild chronic left at least L5 greater than right lumbar radiculopathy. No evidence of generalized large fiber neuropathy or myopathy. EMG/NCS of bilateral upper extremity done 3/3/2021 revealed right greater than left mild mid to lower cervical radiculopathy. No evidence of entrapment neuropathy, generalized large fiber neuropathy or myopathy. Cervical and lumbar MRIs were ordered. Initially denied by insurance. Cervical x-ray done 3/16/2021 revealed mild multilevel degenerative disc disease and spondylosis most significant at C5-6 and C6-7. Lumbar x-ray done 3/16/2021 revealed multilevel degenerative changes with spondylosis. Patient was informed and referred to physical therapy and advised to take Aleve or Motrin as needed. Patient did undergo physical therapy with no benefit. Continues to have numbness and tingling intermittently of his hands and feet.      Review of records revealed patient was admitted [facility] last 7/20/2021 and discharged the same day for chest pains, nausea and diaphoresis. Negative EKG and troponin. Elevated TSH at 5.53. Unremarkable CBC, CMP, magnesium, lipase, free T4, CK-MB and index, urinalysis, lipid panel and hemoglobin A1c. Patient was seen by Dr. Tati Dennison (cardiology) 7/27/2021 and mentions patient has had previous extensive cardiac work-up in 2019. No need to repeat. Monitor for now. Patient is compliant with Plavix 75 mg daily for TIA prophylaxis. Denies any side effects. Outside reports reviewed: office notes, ER notes, radiology report, lab reports, EMG/NCS    Review of Systems:    A comprehensive review of systems was performed:   Constitutional: positive for none  Eyes: positive for none  Ears, nose, mouth, throat, and face: positive for hearing loss  Respiratory: positive for none  Cardiovascular: positive for leg swelling  Gastrointestinal: positive for none  Genitourinary: positive for none  Integument/breast: positive for none  Hematologic/lymphatic: positive for none  Musculoskeletal: positive for joint pain  Neurological: positive for numbness/tingling   Behavioral/Psych: positive for none  Endocrine: positive for none  Allergic/Immunologic: positive for none      Objective:     Visit Vitals  BP (!) 146/80   Pulse 65   Resp 20   SpO2 98%         PHYSICAL EXAM:    NEUROLOGICAL EXAM:    Appearance: The patient is obese, provides a coherent history and is in no acute distress. Mental Status: Oriented to time, place and person. Fluent, no aphasia or dysarthria. Mood and affect appropriate. Cranial Nerves:   II - XII were intact. Motor:  5/5 strength in upper and lower proximal and distal muscles. Normal bulk and tone. No pronator drift. Reflexes:   Deep tendon reflexes were symmetrical.    Sensory:   Normal to PP. Decrease cold in soles. Profound decrease in vibratory sensation. .   Gait:  Antalgic but steady. No Romberg. Problems tandem walking. Tremor:   No tremor noted.    Cerebellar:  Intact FTN/AVANI/HTS. Lab Review      Assessment:   Cervical radiculopathy  Lumbar radiculopathy  Paresthesia  TIA    Plan:   Neurological examination reveals decrease cold sensation in the soles but more profound loss of vibratory sensation causing problems with tandem walking. EMG/NCS of bilateral upper extremity did not reveal any entrapment neuropathy, generalized large fiber neuropathy or myopathy. It did reveal mild right greater than left mid to lower cervical radiculopathy. Cervical x-ray revealed mild multilevel spondylosis from C5-C7. Discussed trial of symptomatic treatment with medication and continue exercises taught by physical therapy and if continues to persist or progress especially associated weakness then we will proceed with cervical MRI. Patient understood. Trial of gabapentin 100 mg twice daily initially and up to 100 mg 3 times daily if necessary. Prescription was provided. Further titration will depend upon response and tolerability. EMG/NCS of bilateral lower extremity did not reveal any neuropathy or myopathy. Mild chronic left at least L5 greater than right lumbar radiculopathy. Lumbar x-ray revealed degenerative changes and spondylosis. No clear benefit with physical therapy. Trial of medication as above. If condition continues to progress then will obtain lumbar MRI for correlate. Hands and feet numbness and tingling is also suspicious for possible Raynaud's. Monitor for now. History of TIA. Continue Plavix 75 mg daily for stroke prophylaxis. This will be a life time need. Call 911 if new deficits occur. Strict compliance with dietary modifications and medications for HTN and hyperlipidemia. All questions and concerns were answered. This note was created using voice recognition software. Despite editing, there may be syntax errors.

## 2021-09-14 NOTE — LETTER
9/16/2021    Patient: Barbra Saleh   YOB: 1950   Date of Visit: 9/14/2021     Pham Bales MD  [address] [facility] [phone] [phone]  [address] [facility] [address]    Dear Pham Bales MD,      Thank you for referring Mr. Barbra Saleh to [facility] for evaluation. My notes for this consultation are attached. If you have questions, please do not hesitate to call me. I look forward to following your patient along with you.       Sincerely,    Digna Jacob MD

## 2021-09-24 DIAGNOSIS — L71.9 ROSACEA: ICD-10-CM

## 2021-09-25 RX ORDER — METRONIDAZOLE 7.5 MG/G
GEL TOPICAL
Qty: 45 G | Refills: 1 | Status: SHIPPED | OUTPATIENT
Start: 2021-09-25 | End: 2022-02-17

## 2021-10-07 ENCOUNTER — OFFICE VISIT (OUTPATIENT)
Dept: FAMILY MEDICINE CLINIC | Age: 71
End: 2021-10-07
Payer: COMMERCIAL

## 2021-10-07 VITALS
DIASTOLIC BLOOD PRESSURE: 54 MMHG | HEIGHT: 68 IN | OXYGEN SATURATION: 98 % | TEMPERATURE: 97.6 F | RESPIRATION RATE: 16 BRPM | BODY MASS INDEX: 33.49 KG/M2 | SYSTOLIC BLOOD PRESSURE: 130 MMHG | WEIGHT: 221 LBS | HEART RATE: 64 BPM

## 2021-10-07 DIAGNOSIS — M54.16 LUMBAR RADICULOPATHY: ICD-10-CM

## 2021-10-07 DIAGNOSIS — M54.12 CERVICAL RADICULOPATHY: ICD-10-CM

## 2021-10-07 DIAGNOSIS — L98.9 SKIN LESION: ICD-10-CM

## 2021-10-07 DIAGNOSIS — R19.5 LOOSE STOOLS: ICD-10-CM

## 2021-10-07 DIAGNOSIS — L71.9 ROSACEA: Primary | ICD-10-CM

## 2021-10-07 PROCEDURE — 99214 OFFICE O/P EST MOD 30 MIN: CPT | Performed by: STUDENT IN AN ORGANIZED HEALTH CARE EDUCATION/TRAINING PROGRAM

## 2021-10-07 RX ORDER — SAME BUTANEDISULFONATE/BETAINE 400-600 MG
250 POWDER IN PACKET (EA) ORAL 2 TIMES DAILY
Qty: 60 CAPSULE | Refills: 1 | Status: SHIPPED | OUTPATIENT
Start: 2021-10-07 | End: 2021-12-06

## 2021-10-07 NOTE — PROGRESS NOTES
1. Have you been to the ER, urgent care clinic since your last visit? Hospitalized since your last visit? No    2. Have you seen or consulted any other health care providers outside of the [address] [place] [address] since your last visit? Include any pap smears or colon screening.  No   Visit Vitals  BP (!) 130/54 (BP 1 Location: Left upper arm, BP Patient Position: Sitting)   Pulse 64   Temp 97.6 °F (36.4 °C) (Axillary)   Resp 16   Ht 5' 8\" (1.727 m)   Wt 221 lb (100.2 kg)   SpO2 98%   BMI 33.60 kg/m²

## 2021-10-07 NOTE — PROGRESS NOTES
Subjective:     Chief Complaint   Patient presents with    Follow-up     HPI:  Angela George is a 80 y.o. male who presents for follow-up of diarrhea, radiculopathy, rosacea, loose stools and lesion on his finger. Patient complained of diarrhea last visit. Chronic minocycline was stopped. Since then his symptoms have improved and he may have loose stools once or twice a day he is very pleased with this. For his rosacea symptoms second will start MetroGel started. He feels that the MetroGel was doing a good job, and rosacea has stayed well controlled. His nose is mildly red today. Patient had neuropathy type symptoms in the upper and lower extremities. He was sent to neurology. EMGs were positive for cervical and lumbar radiculopathy. He was placed on gabapentin and symptoms have improved significantly. He is very pleased with this. Patient has a small lesion on his finger. Was first noted a couple weeks ago he is unsure how long he had a. Past Medical History:   Diagnosis Date    Benign prostatic hyperplasia 1/15/2021    Blood glucose abnormal 1/15/2021    Essential hypertension 1/15/2021    Hypogonadism in male 1/15/2021    Mixed hyperlipidemia 1/15/2021    Other specified hypothyroidism 1/15/2021    Palpitations     Prediabetes 1/15/2021    Rosacea 1/15/2021    TIA (transient ischemic attack)     Per patient. Negative imgaing.      Family History   Problem Relation Age of Onset    Heart Disease Mother         had complications after heart valve surgery     Bleeding Prob Half-brother         Brain Tumor     Bleeding Prob Half-sister         Bone Cancer     Heart Attack Half-brother         2003    Heart Attack Half-brother         18's     Social History     Socioeconomic History    Marital status:      Spouse name: Not on file    Number of children: Not on file    Years of education: Not on file    Highest education level: Not on file   Occupational History    Not on file   Tobacco Use    Smoking status: Never Smoker    Smokeless tobacco: Never Used   Vaping Use    Vaping Use: Never used   Substance and Sexual Activity    Alcohol use: Never    Drug use: Never    Sexual activity: Not on file   Other Topics Concern    Not on file   Social History Narrative    Not on file     Social Determinants of Health     Financial Resource Strain:     Difficulty of Paying Living Expenses:    Food Insecurity:     Worried About Running Out of Food in the Last Year:     [address] in the Last Year:    Transportation Needs:     Lack of Transportation (Medical):  Lack of Transportation (Non-Medical):    Physical Activity:     Days of Exercise per Week:     Minutes of Exercise per Session:    Stress:     Feeling of Stress :    Social Connections:     Frequency of Communication with Friends and Family:     Frequency of Social Gatherings with Friends and Family:     Attends Yaz[employer] Services:     Active Member of Clubs or Organizations:     Attends Club or Organization Meetings:     Marital Status:    Intimate Partner Violence:     Fear of Current or Ex-Partner:     Emotionally Abused:     Physically Abused:     Sexually Abused:      Current Outpatient Medications on File Prior to Visit   Medication Sig Dispense Refill    metroNIDAZOLE (METROGEL) 0.75 % topical gel APPLY TOPICALLY TO THE AFFECTED AREA TWICE DAILY 45 g 1    gabapentin (NEURONTIN) 100 mg capsule Take 1 cap BID x 1 week; then 1 cap TID 90 Capsule 2    testosterone enanthate (Xyosted) 75 mg/0.5 mL atIn 0.5 mL by SubCUTAneous route Every Saturday.  cholecalciferol (VITAMIN D3) (1000 Units /25 mcg) tablet Take 2 Tablets by mouth daily.  180 Tablet 2    atorvastatin (LIPITOR) 20 mg tablet TAKE 1 TABLET DAILY 90 Tab 3    Synthroid 100 mcg tablet TAKE 1 TABLET EVERY MORNING 90 Tab 3    clopidogreL (PLAVIX) 75 mg tab TAKE 1 TABLET ONCE DAILY 90 Tab 3    benazepril-hydroCHLOROthiazide (LOTENSIN HCT) 10-12.5 mg per tablet TAKE 1 TABLET DAILY FOR    BLOOD PRESSURE 90 Tab 3    latanoprost (XALATAN) 0.005 % ophthalmic solution Administer 1 Drop to both eyes nightly.  mirabegron ER (Myrbetriq) 50 mg ER tablet Take 50 mg by mouth daily. (Patient not taking: Reported on 10/7/2021)       No current facility-administered medications on file prior to visit. Allergies   Allergen Reactions    Zocor [Simvastatin] Myalgia     Review of Systems   Constitutional: Negative for fever. Respiratory: Negative for shortness of breath. Cardiovascular: Negative for chest pain and palpitations. Neurological: Negative for dizziness. Objective:     Vitals:    10/07/21 1535   BP: (!) 130/54   Pulse: 64   Resp: 16   Temp: 97.6 °F (36.4 °C)   TempSrc: Axillary   SpO2: 98%   Weight: 221 lb (100.2 kg)   Height: 5' 8\" (1.727 m)     Physical Exam  Vitals reviewed. HENT:      Head: Normocephalic and atraumatic. Cardiovascular:      Rate and Rhythm: Normal rate. Pulmonary:      Effort: Pulmonary effort is normal.   Skin:     Comments: Round lesion measuring 0 .5 x 0.5 cm. Nontender with central punctum, on the right middle finger. Mild redness noted over his nose   Neurological:      Mental Status: He is oriented to person, place, and time. Psychiatric:         Behavior: Behavior normal.                Assessment/Plan:       ICD-10-CM ICD-9-CM    1. Rosacea  L71.9 695.3    2. Skin lesion  L98.9 709.9    3. Cervical radiculopathy  M54.12 723.4    4. Lumbar radiculopathy  M54.16 724.4    5. Loose stools  R19.5 787.7 Saccharomyces boulardii (Florastor) 250 mg capsule     Rosacea -well-controlled since stopping oral minocycline and starting MetroGel. Loose stools/diarrhea-has improved significantly since stopping minocycline, and now only has occasional loose stools. Probiotics ordered. Cervical/lumbar radiculopathy-following with neurology.   Symptoms have improved significantly with gabapentin    Right middle finger lesion-I suspect this may be an epidermoid cyst versus ganglion cyst.  We will watch for now. Can order ultrasound if needed. Patient is pleased as his issues are improving. Follow-up and Dispositions    · Return in about 4 months (around 2/7/2022) for Wellness.             Cosme Barry MD

## 2021-10-08 PROBLEM — M54.12 CERVICAL RADICULOPATHY: Status: ACTIVE | Noted: 2021-10-08

## 2021-10-08 PROBLEM — M54.16 LUMBAR RADICULOPATHY: Status: ACTIVE | Noted: 2021-10-08

## 2021-10-21 ENCOUNTER — OFFICE VISIT (OUTPATIENT)
Dept: NEUROLOGY | Age: 71
End: 2021-10-21
Payer: COMMERCIAL

## 2021-10-21 VITALS — SYSTOLIC BLOOD PRESSURE: 122 MMHG | RESPIRATION RATE: 20 BRPM | DIASTOLIC BLOOD PRESSURE: 70 MMHG

## 2021-10-21 DIAGNOSIS — M54.12 CERVICAL RADICULOPATHY: Primary | ICD-10-CM

## 2021-10-21 DIAGNOSIS — M54.16 LUMBAR RADICULOPATHY: ICD-10-CM

## 2021-10-21 DIAGNOSIS — G45.9 TIA (TRANSIENT ISCHEMIC ATTACK): ICD-10-CM

## 2021-10-21 DIAGNOSIS — R20.2 PARESTHESIA: ICD-10-CM

## 2021-10-21 PROCEDURE — 99214 OFFICE O/P EST MOD 30 MIN: CPT | Performed by: PSYCHIATRY & NEUROLOGY

## 2021-10-21 RX ORDER — GABAPENTIN 100 MG/1
200 CAPSULE ORAL 3 TIMES DAILY
Qty: 180 CAPSULE | Refills: 4 | Status: SHIPPED | OUTPATIENT
Start: 2021-10-21 | End: 2022-04-18 | Stop reason: SDUPTHER

## 2021-10-21 NOTE — LETTER
10/21/2021    Patient: Oscar Echevarria   YOB: 1950   Date of Visit: 10/21/2021     Kiera Mckee MD  [phone] [phone]  [address] [facility] [address]    Dear Kiera Mckee MD,      Thank you for referring Mr. Oscar Echevarria to [facility] for evaluation. My notes for this consultation are attached. If you have questions, please do not hesitate to call me. I look forward to following your patient along with you.       Sincerely,    Nilay Luis MD

## 2021-10-21 NOTE — PROGRESS NOTES
NEUROLOGY CLINIC NOTE    Patient ID:  Alix Alvarez  [id number]  44 y.o.  1950    Date of Visit:  October 21, 2021    Referring Physician: Dr. Johanny Petty    Reason for Visit:  Hand and feet cold sensation    No chief complaint on file. History of Present Illness:     Patient Active Problem List    Diagnosis Date Noted    Cervical radiculopathy 10/08/2021    Lumbar radiculopathy 10/08/2021    Chest pain 07/20/2021    LEO (obstructive sleep apnea) 07/20/2021    TIA (transient ischemic attack)     Cervical spondylosis with radiculopathy 03/21/2021    DDD (degenerative disc disease), cervical 03/21/2021    Other spondylosis with radiculopathy, lumbar region 03/21/2021    Neuropathy 01/18/2021    Benign prostatic hyperplasia 01/15/2021    Essential hypertension 01/15/2021    Hypogonadism in male 01/15/2021    Other specified hypothyroidism 01/15/2021    Mixed hyperlipidemia 01/15/2021    Prediabetes 01/15/2021    Rosacea 01/15/2021    Severe obesity ([address] 09/10/2019     Past Medical History:   Diagnosis Date    Benign prostatic hyperplasia 1/15/2021    Blood glucose abnormal 1/15/2021    Essential hypertension 1/15/2021    Hypogonadism in male 1/15/2021    Mixed hyperlipidemia 1/15/2021    Other specified hypothyroidism 1/15/2021    Palpitations     Prediabetes 1/15/2021    Rosacea 1/15/2021    TIA (transient ischemic attack)     Per patient. Negative imgaing. Past Surgical History:   Procedure Laterality Date    HX CIRCUMCISION  2001    HX COLONOSCOPY  07/13/2018    2 adenomatous polyps removed -Dr. Pankaj Albarran HX COLONOSCOPY  01/01/2012    HX CYST REMOVAL  1963    pilonidal cyst surgery    HX HERNIA REPAIR      HX KNEE REPLACEMENT Bilateral     HX ORTHOPAEDIC      Neuroma on foot removed     HX VASECTOMY      HX VEIN STRIPPING  12/2008    Dr. Berry Daniels      Prior to Admission medications    Medication Sig Start Date End Date Taking?  Authorizing Provider   Saccharomyces boulardii (Florastor) 250 mg capsule Take 1 Capsule by mouth two (2) times a day for 60 days. 10/7/21 12/6/21  Eliza Rizzo MD   metroNIDAZOLE (METROGEL) 0.75 % topical gel APPLY TOPICALLY TO THE AFFECTED AREA TWICE DAILY 9/25/21   Eliza Rizzo MD   gabapentin (NEURONTIN) 100 mg capsule Take 1 cap BID x 1 week; then 1 cap TID 9/14/21   Princess Lawrence MD   testosterone enanthate Debby Grammes) 75 mg/0.5 mL atIn 0.5 mL by SubCUTAneous route Every Saturday. Provider, Historical   mirabegron ER (Myrbetriq) 50 mg ER tablet Take 50 mg by mouth daily. Patient not taking: Reported on 10/7/2021    Provider, Historical   cholecalciferol (VITAMIN D3) (1000 Units /25 mcg) tablet Take 2 Tablets by mouth daily. 7/8/21   Eliza Rizzo MD   atorvastatin (LIPITOR) 20 mg tablet TAKE 1 TABLET DAILY 4/19/21   Eliza Rizzo MD   Synthroid 100 mcg tablet TAKE 1 TABLET EVERY MORNING 4/4/21   Joana Abarca NP   clopidogreL (PLAVIX) 75 mg tab TAKE 1 TABLET ONCE DAILY 4/4/21   Joana Abarca NP   benazepril-hydroCHLOROthiazide (LOTENSIN HCT) 10-12.5 mg per tablet TAKE 1 TABLET DAILY FOR    BLOOD PRESSURE 1/16/21   Harry Sheppard NP   latanoprost (XALATAN) 0.005 % ophthalmic solution Administer 1 Drop to both eyes nightly. Provider, Historical     Allergies   Allergen Reactions    Zocor [Simvastatin] Myalgia      Social History     Tobacco Use    Smoking status: Never Smoker    Smokeless tobacco: Never Used   Substance Use Topics    Alcohol use: Never      Family History   Problem Relation Age of Onset    Heart Disease Mother         had complications after heart valve surgery     Bleeding Prob Half-brother         Brain Tumor     Bleeding Prob Half-sister         Bone Cancer     Heart Attack Half-brother         2003    Heart Attack Half-brother         1980's        Subjective:      Sammy Zamora is a 80 y.o.  RHWM with history of BPH, HTN, hypogonadism, hyperlipidemia, thyroid disorder, prediabetes and TIA who was referred here by Dr. Brent Castañeda for further evaluation of hand and feet cold sensation. Patient is here for follow up. Noticeable for the past 2 years but longer than that. Coldness in the fingers and toes - mid morning or early evening in the winter  Bluish discoloration  Not much during the summertime   Feels unstable. No falls. No weakness or loss of muscle bulk. Issues with ankle swelling better with compression stockings  Some discomfort but no pain  No sense of weakness in the hands or dropping things. 6 months sleeps with the arms up to prevent numbness and tingling. Resting elbow on armrest causes numbness and tingling bilateral 4th and 5th fingers    Teacher at [employer]    Denies any significant alcohol use. History of TIA. Placed on Aggrenox but due to nosebleeds and shifted to clopidrogel 75 mg daily    Patient was seen by his PCP last 1/18/2021. Note mentions pins and needle sensation and tingling in the feet. Less often in the hands. Also notes discoloration of the fingers. Labs done were unremarkable (CBC, CMP, B12, folate, magnesium, lipid panel, TSH and hgbA1c) except for extremely low vitamin D levels. Patient underwent EMG/NCS of bilateral lower extremity 3/2/2021 which revealed mild chronic left at least L5 greater than right lumbar radiculopathy. No evidence of generalized large fiber neuropathy or myopathy. EMG/NCS of bilateral upper extremity done 3/3/2021 revealed right greater than left mild mid to lower cervical radiculopathy. No evidence of entrapment neuropathy, generalized large fiber neuropathy or myopathy. Cervical and lumbar MRIs were ordered. Initially denied by insurance. Cervical x-ray done 3/16/2021 revealed mild multilevel degenerative disc disease and spondylosis most significant at C5-6 and C6-7. Lumbar x-ray done 3/16/2021 revealed multilevel degenerative changes with spondylosis.   Patient was informed and referred to physical therapy and advised to take Aleve or Motrin as needed. Review of records revealed patient was admitted [place] [address] last 7/20/2021 and discharged the same day for chest pains, nausea and diaphoresis. Negative EKG and troponin. Elevated TSH at 5.53. Unremarkable CBC, CMP, magnesium, lipase, free T4, CK-MB and index, urinalysis, lipid panel and hemoglobin A1c. Patient was seen by Dr. Simran Bellamy (cardiology) 7/27/2021 and mentions patient has had previous extensive cardiac work-up in 2019. No need to repeat. Monitor for now. Since the last visit on September 14, 2021, patient was started on gabapentin 100 mg 3 times daily. He immediately noticed improvement with ability to easily close his fingers. It does help significantly with numbness and tingling that occurred intermittently in his hands and feet. He knowledges has episodes of left neck and shoulder discomforts. He is happy with his progress. Patient is compliant with Plavix 75 mg daily for TIA prophylaxis. Denies any side effects. Outside reports reviewed: none    Review of Systems:    A comprehensive review of systems was performed:   Constitutional: positive for none  Eyes: positive for none  Ears, nose, mouth, throat, and face: positive for hearing loss  Respiratory: positive for none  Cardiovascular: positive for leg swelling  Gastrointestinal: positive for none  Genitourinary: positive for none  Integument/breast: positive for none  Hematologic/lymphatic: positive for none  Musculoskeletal: positive for joint pain  Neurological: positive for numbness/tingling   Behavioral/Psych: positive for none  Endocrine: positive for none  Allergic/Immunologic: positive for none      Objective:     Visit Vitals  /70   Resp 20       PHYSICAL EXAM:    NEUROLOGICAL EXAM:    Appearance: The patient is obese, provides a coherent history and is in no acute distress. Mental Status: Oriented to time, place and person. Fluent, no aphasia or dysarthria. Mood and affect appropriate. Cranial Nerves:   II - XII were intact. Motor:  5/5 strength in upper and lower proximal and distal muscles. Normal bulk and tone. No pronator drift. Reflexes:   Deep tendon reflexes were symmetrical.    Sensory:   Normal to PP. Decrease cold in soles. Profound decrease in vibratory sensation. .   Gait:  Antalgic but steady. No Romberg. Problems tandem walking. Tremor:   No tremor noted. Cerebellar:  Intact FTN/AVANI/HTS. Assessment:   Cervical radiculopathy  Lumbar radiculopathy  Paresthesia  TIA    Plan:   Neurological examination reveals decrease cold sensation in the soles but more profound loss of vibratory sensation causing problems with tandem walking. Previous EMG/NCS of bilateral upper extremity did not reveal any entrapment neuropathy, generalized large fiber neuropathy or myopathy. It did reveal mild right greater than left mid to lower cervical radiculopathy. Cervical x-ray revealed mild multilevel spondylosis from C5-C7. Discussed trial of symptomatic treatment with medication and continue exercises taught by physical therapy and if continues to persist or progress especially associated weakness then we will proceed with cervical MRI. Patient understood. Increase dose of gabapentin to 200 mg 3 times daily. Prescription was provided. Further titration will depend upon response and tolerability. EMG/NCS of bilateral lower extremity did not reveal any neuropathy or myopathy. Mild chronic left at least L5 greater than right lumbar radiculopathy. Lumbar x-ray revealed degenerative changes and spondylosis. No clear benefit with physical therapy. Trial of increase medication as above. If condition continues to progress then will obtain lumbar MRI for correlate. Hands and feet numbness and tingling is also suspicious for possible Raynaud's. Monitor for now. History of TIA.  Continue Plavix 75 mg daily for stroke prophylaxis. This will be a life time need. Call 911 if new deficits occur. Strict compliance with dietary modifications and medications for HTN and hyperlipidemia. All questions and concerns were answered. This note was created using voice recognition software. Despite editing, there may be syntax errors.

## 2022-01-30 NOTE — PROGRESS NOTES
CARDIOLOGY OFFICE NOTE    Vicente Zacarias MD, [address], [place], [address] [facility]  Phone [phone]; Fax [phone]  Mobile [phone]   Voice Mail [phone]    LAST OFFICE VISIT : Visit date not found  Casandra Ryan MD       ATTENTION:   This medical record was transcribed using an electronic medical records/speech recognition system. Although proofread, it may and can contain electronic, spelling and other errors. Corrections may be executed at a later time. Please feel free to contact us for any clarifications as needed. ICD-10-CM ICD-9-CM   1. Palpitations  R00.2 785.1   2. Essential hypertension  I10 401.9   3. LEO on CPAP  G47.33 327.23    Z99.89 V46.8   4. Bilateral leg edema  R60.0 782. 3            Diamond Lemos is a 80 y.o. male with  referred for palpitations and lower extremity edema   . Cardiac risk factors: dyslipidemia, HTN, male gender  I have personally obtained the history from the patient. HISTORY OF PRESENTING ILLNESS    Diamond Lemos is a 71y. o. male. He does have a history of dyslipidemia, LEO, and hypertension. All the stress testing was done in 2019 and was normal. He had a twinge of chest discomfort that sounded atypical for angina. Told him he gets worse with any more episodes with activity please let me know. Blood pressure is elevated today and he states he has not had any excessive sodium intake recently and no medicine changes.          ACTIVE PROBLEM LIST     Patient Active Problem List    Diagnosis Date Noted    Cervical radiculopathy 10/08/2021    Lumbar radiculopathy 10/08/2021    Chest pain 07/20/2021    LEO (obstructive sleep apnea) 07/20/2021    TIA (transient ischemic attack)     Cervical spondylosis with radiculopathy 03/21/2021    DDD (degenerative disc disease), cervical 03/21/2021    Other spondylosis with radiculopathy, lumbar region 03/21/2021    Neuropathy 01/18/2021    Benign prostatic hyperplasia 01/15/2021    Essential hypertension 01/15/2021    Hypogonadism in male 01/15/2021    Other specified hypothyroidism 01/15/2021    Mixed hyperlipidemia 01/15/2021    Prediabetes 01/15/2021    Rosacea 01/15/2021    Severe obesity ([address] 09/10/2019           PAST MEDICAL HISTORY     Past Medical History:   Diagnosis Date    Benign prostatic hyperplasia 1/15/2021    Blood glucose abnormal 1/15/2021    Essential hypertension 1/15/2021    Hypogonadism in male 1/15/2021    Mixed hyperlipidemia 1/15/2021    Other specified hypothyroidism 1/15/2021    Palpitations     Prediabetes 1/15/2021    Rosacea 1/15/2021    TIA (transient ischemic attack)     Per patient. Negative imgaing.            PAST SURGICAL HISTORY     Past Surgical History:   Procedure Laterality Date    HX CATARACT REMOVAL  08/2021    HX CIRCUMCISION  2001    HX COLONOSCOPY  07/13/2018    2 adenomatous polyps removed -Dr. Josesito Marquez HX COLONOSCOPY  01/01/2012    HX CYST REMOVAL  1963    pilonidal cyst surgery    HX HERNIA REPAIR      HX KNEE REPLACEMENT Bilateral     HX ORTHOPAEDIC      Neuroma on foot removed     HX VASECTOMY      HX VEIN STRIPPING  12/2008    Dr. Angely Marin [Simvastatin] Myalgia          FAMILY HISTORY     Family History   Problem Relation Age of Onset    Heart Disease Mother         had complications after heart valve surgery     Bleeding Prob Half-brother         Brain Tumor     Bleeding Prob Half-sister         Bone Cancer     Heart Attack Half-brother         2003    Heart Attack Half-brother         1980's    negative for cardiac disease       SOCIAL HISTORY     Social History     Socioeconomic History    Marital status:    Tobacco Use    Smoking status: Never Smoker    Smokeless tobacco: Never Used   Vaping Use    Vaping Use: Never used   Substance and Sexual Activity    Alcohol use: Never    Drug use: Never MEDICATIONS     Current Outpatient Medications   Medication Sig    tadalafiL (CIALIS) 5 mg tablet Take 5 mg by mouth daily.  solifenacin (VESICARE) 10 mg tablet 10 mg.    tamsulosin (FLOMAX) 0.4 mg capsule Take 0.4 mg by mouth.  benazepril-hydroCHLOROthiazide (LOTENSIN HCT) 10-12.5 mg per tablet TAKE 1 TABLET DAILY FOR    BLOOD PRESSURE    gabapentin (NEURONTIN) 100 mg capsule Take 2 Capsules by mouth three (3) times daily. Max Daily Amount: 600 mg.    metroNIDAZOLE (METROGEL) 0.75 % topical gel APPLY TOPICALLY TO THE AFFECTED AREA TWICE DAILY    testosterone enanthate (Xyosted) 75 mg/0.5 mL atIn 0.5 mL by SubCUTAneous route Every Saturday.  cholecalciferol (VITAMIN D3) (1000 Units /25 mcg) tablet Take 2 Tablets by mouth daily.  atorvastatin (LIPITOR) 20 mg tablet TAKE 1 TABLET DAILY    Synthroid 100 mcg tablet TAKE 1 TABLET EVERY MORNING    clopidogreL (PLAVIX) 75 mg tab TAKE 1 TABLET ONCE DAILY    latanoprost (XALATAN) 0.005 % ophthalmic solution Administer 1 Drop to both eyes nightly. No current facility-administered medications for this visit. I have reviewed the nurses notes, vitals, problem list, allergy list, medical history, family, social history and medications. REVIEW OF SYMPTOMS   As per HPI  General: Pt denies excessive weight gain or loss. Pt is able to conduct ADL's  HEENT: Denies blurred vision, headaches, hearing loss, epistaxis and difficulty swallowing. Respiratory: Denies cough, congestion, shortness of breath, SEGOVIA, wheezing or stridor.   Cardiovascular: Denies precordial pain, palpitations, edema or PND  Gastrointestinal: Denies poor appetite, indigestion, abdominal pain or blood in stool  Genitourinary: Denies hematuria, dysuria, increased urinary frequency  Musculoskeletal: Denies joint pain or swelling from muscles or joints  Neurologic: Denies tremor, paresthesias, headache, or sensory motor disturbance  Psychiatric: Denies confusion, insomnia, depression  Integumentray: Denies rash, itching or ulcers. Hematologic: Denies easy bruising, bleeding     PHYSICAL EXAMINATION      Vitals:    02/01/22 1604 02/01/22 1621   BP: (!) 156/88 (!) 162/82   Pulse: 66    SpO2: 100%    Weight: 229 lb (103.9 kg)    Height: 5' 8\" (1.727 m)      General: Well developed, in no acute distress. HEENT: No jaundice, oral mucosa moist, no oral ulcers  Neck: Supple, no stiffness, no lymphadenopathy, supple  Heart:  Normal S1/S2 negative S3 or S4. Regular, no murmur, gallop or rub, no jugular venous distention  Respiratory: Clear bilaterally x 4, no wheezing or rales  Extremities: Wearing compression hose, normal cap refill, no cyanosis. Musculoskeletal: No clubbing, no deformities  Neuro: A&Ox3, speech clear, gait stable, cooperative, no focal neurologic deficits  Skin: Skin color is normal. No rashes or lesions. Non diaphoretic, moist.             DIAGNOSTIC DATA     1. Echo   10/21/19-EF 60%, LAE, AV sclerosis, Mild AI/MR/TR/PI, Pulmonary arterial systolic pressure is 33.3 mmHg. 7/20/21-EF 60 - 65%, LAE    2. Stress Test   10/21/19-Cardiolite- normal, mets 10.1     3. Lipids   9/14/19- TC 97, HDL 31, LDL 43,    1/23/20- , HDL 40, LDL 53,    7/20/21- , HDL 40, LDL 55.2,      4. LE Duplex   11/13/19-No evidence of deep vein thrombosis or venous obstruction within the lower extremities bilaterally     5.  Loop   9/29-10/28/19- SR with PVC's, average HR 64, min 43, max 143         LABORATORY DATA            Lab Results   Component Value Date/Time    WBC 6.0 07/20/2021 03:23 AM    HGB 14.4 07/20/2021 03:23 AM    HCT 43.5 07/20/2021 03:23 AM    PLATELET 357 57/29/5235 03:23 AM    MCV 98.9 07/20/2021 03:23 AM      Lab Results   Component Value Date/Time    Sodium 141 07/20/2021 03:23 AM    Potassium 4.1 07/20/2021 03:23 AM    Chloride 111 (H) 07/20/2021 03:23 AM    CO2 26 07/20/2021 03:23 AM    Anion gap 4 (L) 07/20/2021 03:23 AM    Glucose 101 (H) 07/20/2021 03:23 AM    BUN 25 (H) 07/20/2021 03:23 AM    Creatinine 1.17 07/20/2021 03:23 AM    BUN/Creatinine ratio 21 (H) 07/20/2021 03:23 AM    GFR est AA >60 07/20/2021 03:23 AM    GFR est non-AA >60 07/20/2021 03:23 AM    Calcium 8.5 07/20/2021 03:23 AM    Bilirubin, total 0.5 07/20/2021 03:23 AM    Alk. phosphatase 75 07/20/2021 03:23 AM    Protein, total 6.4 07/20/2021 03:23 AM    Albumin 3.6 07/20/2021 03:23 AM    Globulin 2.8 07/20/2021 03:23 AM    A-G Ratio 1.3 07/20/2021 03:23 AM    ALT (SGPT) 21 07/20/2021 03:23 AM           PLAN        1. HTN  - Blood pressure is is elevated in reviewing his chart over the years this is probably one of the highest numbers he has had.  -I rechecked his blood pressure is 160/68  -Only medicine changes been the gabapentin  -Denies any over-the-counter medicines  -Follow-up in 4 weeks for blood pressure check; I said he seen his primary care next week and will have his blood pressure checked there as well.     2. Dyslipidemia  -LDL is excellent in the 50 range  -We will repeat labs I gave him a lab slip to have a strong I think you once his primary care to check     3. LEO on CPAP  -Failed to ask him if he was using his CPAP machine but I am sure he is     4. Possible TIA  -On Plavix    Follow-up with me in 6 months or PRN    We discussed the expected course, resolution and complications of the diagnosis(es) in detail. Medication risks, benefits, costs, interactions, and alternatives were discussed as indicated. I advised him to contact the office if his condition worsens, changes or fails to improve as anticipated. He expressed understanding with the diagnosis(es) and plan    Orders Placed This Encounter    tadalafiL (CIALIS) 5 mg tablet     Sig: Take 5 mg by mouth daily.  solifenacin (VESICARE) 10 mg tablet     Sig: 10 mg.    tamsulosin (FLOMAX) 0.4 mg capsule     Sig: Take 0.4 mg by mouth.        We discussed the expected course, resolution and complications of the diagnosis(es) in detail. Medication risks, benefits, costs, interactions, and alternatives were discussed as indicated. I advised him to contact the office if his condition worsens, changes or fails to improve as anticipated. He expressed understanding with the diagnosis(es) and plan        Follow-up and Dispositions  ·   Return in about 6 months (around 8/1/2022). I have discussed the diagnosis with  Hanna Jorgensen and the intended plan as seen in the above orders. Questions were answered concerning future plans. I have discussed medication side effects and warnings with the patient as well. Thank you,  Ga Garcia MD for involving me in the care of  Hanna Jorgensen. Please do not hesitate to contact me for further questions/concerns. Vicente Khalil MD, [facility] [address]  [address]      [facility], [address] [place] [address] Drive      [phone] / [phone] Fax

## 2022-02-01 ENCOUNTER — OFFICE VISIT (OUTPATIENT)
Dept: CARDIOLOGY CLINIC | Age: 72
End: 2022-02-01
Payer: COMMERCIAL

## 2022-02-01 VITALS
DIASTOLIC BLOOD PRESSURE: 82 MMHG | SYSTOLIC BLOOD PRESSURE: 162 MMHG | OXYGEN SATURATION: 100 % | BODY MASS INDEX: 34.71 KG/M2 | HEART RATE: 66 BPM | WEIGHT: 229 LBS | HEIGHT: 68 IN

## 2022-02-01 DIAGNOSIS — R00.2 PALPITATIONS: Primary | ICD-10-CM

## 2022-02-01 DIAGNOSIS — G47.33 OSA ON CPAP: ICD-10-CM

## 2022-02-01 DIAGNOSIS — R60.0 BILATERAL LEG EDEMA: ICD-10-CM

## 2022-02-01 DIAGNOSIS — Z99.89 OSA ON CPAP: ICD-10-CM

## 2022-02-01 DIAGNOSIS — I10 ESSENTIAL HYPERTENSION: ICD-10-CM

## 2022-02-01 PROCEDURE — 99214 OFFICE O/P EST MOD 30 MIN: CPT | Performed by: SPECIALIST

## 2022-02-01 RX ORDER — SOLIFENACIN SUCCINATE 10 MG/1
10 TABLET, FILM COATED ORAL
COMMUNITY
Start: 2021-12-25 | End: 2022-07-01

## 2022-02-01 RX ORDER — TAMSULOSIN HYDROCHLORIDE 0.4 MG/1
0.4 CAPSULE ORAL
COMMUNITY
Start: 2021-11-22 | End: 2022-09-09

## 2022-02-01 RX ORDER — TADALAFIL 5 MG/1
5 TABLET ORAL DAILY
COMMUNITY
Start: 2022-01-14

## 2022-02-01 NOTE — PROGRESS NOTES
Room     Visit Vitals  BP (!) 162/82 (BP 1 Location: Right arm, BP Patient Position: Sitting, BP Cuff Size: Adult)   Pulse 66   Ht 5' 8\" (1.727 m)   Wt 229 lb (103.9 kg)   SpO2 100%   BMI 34.82 kg/m²         Chest pain: no  Shortness of breath: no  Edema: no  Palpitations: no  Dizziness: no    New diagnosis/Surgeries: Cataract Aug 21    ER/Hospitalizations: no    Refills: no

## 2022-02-10 ENCOUNTER — OFFICE VISIT (OUTPATIENT)
Dept: FAMILY MEDICINE CLINIC | Age: 72
End: 2022-02-10
Payer: COMMERCIAL

## 2022-02-10 VITALS
TEMPERATURE: 97.6 F | OXYGEN SATURATION: 99 % | RESPIRATION RATE: 16 BRPM | SYSTOLIC BLOOD PRESSURE: 142 MMHG | HEIGHT: 68 IN | DIASTOLIC BLOOD PRESSURE: 65 MMHG | WEIGHT: 228 LBS | BODY MASS INDEX: 34.56 KG/M2 | HEART RATE: 63 BPM

## 2022-02-10 DIAGNOSIS — Z00.00 WELLNESS EXAMINATION: ICD-10-CM

## 2022-02-10 DIAGNOSIS — E03.8 OTHER SPECIFIED HYPOTHYROIDISM: ICD-10-CM

## 2022-02-10 DIAGNOSIS — I73.00 RAYNAUD'S DISEASE WITHOUT GANGRENE: ICD-10-CM

## 2022-02-10 DIAGNOSIS — I10 ESSENTIAL HYPERTENSION: Primary | ICD-10-CM

## 2022-02-10 DIAGNOSIS — R20.2 PARESTHESIA: ICD-10-CM

## 2022-02-10 DIAGNOSIS — Z23 ENCOUNTER FOR IMMUNIZATION: ICD-10-CM

## 2022-02-10 PROCEDURE — 90471 IMMUNIZATION ADMIN: CPT | Performed by: STUDENT IN AN ORGANIZED HEALTH CARE EDUCATION/TRAINING PROGRAM

## 2022-02-10 PROCEDURE — 90750 HZV VACC RECOMBINANT IM: CPT | Performed by: STUDENT IN AN ORGANIZED HEALTH CARE EDUCATION/TRAINING PROGRAM

## 2022-02-10 PROCEDURE — 99214 OFFICE O/P EST MOD 30 MIN: CPT | Performed by: STUDENT IN AN ORGANIZED HEALTH CARE EDUCATION/TRAINING PROGRAM

## 2022-02-10 PROCEDURE — 99397 PER PM REEVAL EST PAT 65+ YR: CPT | Performed by: STUDENT IN AN ORGANIZED HEALTH CARE EDUCATION/TRAINING PROGRAM

## 2022-02-10 RX ORDER — AMLODIPINE BESYLATE 2.5 MG/1
2.5 TABLET ORAL DAILY
Qty: 30 TABLET | Refills: 0 | Status: SHIPPED | OUTPATIENT
Start: 2022-02-10 | End: 2022-03-25 | Stop reason: SDUPTHER

## 2022-02-10 NOTE — PATIENT INSTRUCTIONS
Check blood pressure twice a day and keep blood pressure log. Call with blood pressure log results next week on Wednesday. Raynaud's Phenomenon: Care Instructions  Overview  Raynaud's is a condition that causes your hands and feet to overreact to cold. They may become painful and numb, and they can change colors, becoming very pale and then blue. This condition also is called Raynaud's phenomenon. There are two kinds of Raynaud's. Primary Raynaud's, also known as Raynaud's disease, happens by itself and is the most common form. Secondary Raynaud's, also called Raynaud's syndrome, happens as part of another disease. In Raynaud's, the small vessels that bring blood to the skin either become narrow, or constrict for a short period of time. This limits blood flow to the hands and feet and sometimes to the nose or ears. Your hands and feet feel cold and numb and then turn very pale. As blood flow returns, your fingers and toes may turn red, and begin to throb and hurt. Raynaud's can be painful and annoying, but it usually does not cause serious problems. You can take simple steps to protect your hands and feet from the cold. If you have a bad case of Raynaud's and you cannot keep your hands and feet warm enough, your doctor may prescribe medicine. Follow-up care is a key part of your treatment and safety. Be sure to make and go to all appointments, and call your doctor if you are having problems. It's also a good idea to know your test results and keep a list of the medicines you take. How can you care for yourself at home? To prevent Raynaud's episodes or ease symptoms  · Run warm water over your hands or feet to increase blood flow. Use another part of your body, such as your forearm, to make sure the water is not too hot; you could burn your hands or feet and not feel it because they are numb. · Swing your arms in a [place] at the sides of your body (\"windmilling\") to increase blood flow.   · If your doctor prescribes medicine to help Raynaud's, take it exactly as prescribed. Call your doctor if you think you are having a problem with your medicine. · If another condition causes your Raynaud's, make sure to follow your treatment for that condition. · Wear mittens or gloves when it is cold outside. Mittens are warmer than gloves because they keep your fingers together. Gloves underneath mittens will keep your hands warmer than gloves alone. You also can use pot holders or oven mitts when getting something from the freezer or refrigerator. · You can slip chemical hand warmers into your mittens or gloves when you do outside activities. · Do not smoke. Nicotine makes blood vessels constrict, which can bring on an attack. If you need help quitting, talk to your doctor about stop-smoking programs and medicines. These can increase your chances of quitting for good. · Avoid caffeine and cold medicines that have pseudoephedrine. They make blood vessels constrict. Beta-blocker medicines, often used to treat high blood pressure, also can make Raynaud's worse. · Drink plenty of fluids to prevent dehydration, which can lower the amount of blood moving through the blood vessels. If you have kidney, heart, or liver disease and have to limit fluids, talk with your doctor before you increase the amount of fluids you drink. · Try to stay calm when you are under stress. Anxiety can make your blood vessels constrict and lead to a Raynaud's attack. To keep your whole body warm  · Eat a hot meal and drink a warm liquid before going outside. They may help raise your body temperature. · Wear layers of warm clothing. The inner layer should be made of a material such as polypropylene that pulls moisture away from your body. · Wear a hat. · Do not wear clothing that is too tight. It can decrease or cut off blood flow. · Try to stay dry. Choose waterproof, breathable jackets and boots.  Being wet makes you more likely to become chilled. When should you call for help? Call your doctor now or seek immediate medical care if:    · You have severe pain in your hands or feet.     · Normal color does not return to your hands or feet.     · Your hands or feet do not warm up even after home care. Watch closely for changes in your health, and be sure to contact your doctor if you have any problems. Where can you learn more? Go to http://www.gray.com/  Enter P043 in the search box to learn more about \"Raynaud's Phenomenon: Care Instructions. \"  Current as of: April 30, 2021               Content Version: 13.0  © 8056-6785 [employer]. Care instructions adapted under license by [employer] (which disclaims liability or warranty for this information). If you have questions about a medical condition or this instruction, always ask your healthcare professional. [address] any warranty or liability for your use of this information.

## 2022-02-10 NOTE — PROGRESS NOTES
Chief Complaint   Patient presents with   [facility] Annual Wellness Visit     Visit Vitals  BP (!) 142/65 (BP 1 Location: Left upper arm, BP Patient Position: Sitting)   Pulse 63   Temp 97.6 °F (36.4 °C) (Axillary)   Resp 16   Ht 5' 8\" (1.727 m)   Wt 228 lb (103.4 kg)   SpO2 99%   BMI 34.67 kg/m²     1. Have you been to the ER, urgent care clinic since your last visit? Hospitalized since your last visit? No    2. Have you seen or consulted any other health care providers outside of the [address] [place] [address] since your last visit? Include any pap smears or colon screening.  No

## 2022-02-10 NOTE — PROGRESS NOTES
Subjective:     Chief Complaint   Patient presents with    Annual Wellness Visit     HPI:  Hanna Jorgensen is a 80 y.o. male. For the wellness:  Flu-up-to-date per patient  Tetanus-will be due later this year as it was done in August 2012  Jenniffer Honey agreed to get shingles vaccine today. [id number]- N/A  Prevnar 13- N/A  HCV screening- complete and negative in 2019  PSA-elevated at 4.6 last visit  Colon cancer screening-colonoscopy in 7/13/2018 is found to have a couple polyps which ended up being benign tubular adenomas. With instructions to follow-up in 5 years. AAA screening- N/A  Low dose CT scan- N/A  Smoking status- never  Moods- at goal, normal phq- 2 today. Exercise-as much as he would like  Dental Exams-regularly  Vision Exams-regularly  Cord vaccine-up-to-date       Complains of neuropathy type symptoms. Gets pins-and-needles, and tingling in bilateral feet which has been worsening recently. Also get similar symptoms in his hands which occur less often. Also notes coldness in hands and feet. Gets white discoloration of hands. Follows with neurology and EMG was negative. He is currently on gabapentin for symptomatic treatment of neuropathy. Per neurology this may be Raynaud's  Gabapentin has helped symptoms some as well as helping close his hands now. He has history of TIA, on Plavix. Imaging was negative the time. Does not follow with neurology.     History of palpitations, sees cardiology for this. [facility] cardiology for his hypertension. During visit blood pressure was elevated  in the office so he was asked to keep a BP log. Blood pressure is elevated once again today. Has not been keeping blood pressure log. He was found to have elevated PSA, elevated testosterone . He was being treated with testosterone therapy at the time. Seeing urology for this.   Testosterone was continued to be high, so they changed him to a subq. and has decreased his dosage to every 3 weeks and has helped his testosterone numbers. Biopsy of the prostate was unremarkable.     History of lower extremity edema, which has improved tremendously with compression stockings. DVT ultrasound, echo are unremarkable. Follows with cardiology. Patient Active Problem List    Diagnosis    Cervical radiculopathy    Lumbar radiculopathy    Chest pain    LEO (obstructive sleep apnea)    TIA (transient ischemic attack)     Per patient. Negative imgaing.  Cervical spondylosis with radiculopathy     Mild spondylosis. Seen on EMG, and XR. Following with neurology.  DDD (degenerative disc disease), cervical     Seen on XR.  Other spondylosis with radiculopathy, lumbar region     Mild spondylosis. Seen on EMG, and XR. Following with neurology.  Neuropathy    Benign prostatic hyperplasia    Essential hypertension    Hypogonadism in male    Other specified hypothyroidism    Mixed hyperlipidemia    Prediabetes    Rosacea    Severe obesity ([address]     Past Medical History:   Diagnosis Date    Benign prostatic hyperplasia 1/15/2021    Blood glucose abnormal 1/15/2021    Essential hypertension 1/15/2021    Hypogonadism in male 1/15/2021    Mixed hyperlipidemia 1/15/2021    Other specified hypothyroidism 1/15/2021    Palpitations     Prediabetes 1/15/2021    Rosacea 1/15/2021    TIA (transient ischemic attack)     Per patient. Negative imgaing. Family History   Problem Relation Age of Onset    Heart Disease Mother         had complications after heart valve surgery     Bleeding Prob Half-brother         Brain Tumor     Bleeding Prob Half-sister         Bone Cancer     Heart Attack Half-brother         2003    Heart Attack Half-brother         1980's      reports that he has never smoked. He has never used smokeless tobacco. He reports that he does not drink alcohol and does not use drugs.   Current Outpatient Medications on File Prior to Visit   Medication Sig Dispense Refill  tadalafiL (CIALIS) 5 mg tablet Take 5 mg by mouth daily.  solifenacin (VESICARE) 10 mg tablet 10 mg.      tamsulosin (FLOMAX) 0.4 mg capsule Take 0.4 mg by mouth.  benazepril-hydroCHLOROthiazide (LOTENSIN HCT) 10-12.5 mg per tablet TAKE 1 TABLET DAILY FOR    BLOOD PRESSURE 90 Tablet 3    gabapentin (NEURONTIN) 100 mg capsule Take 2 Capsules by mouth three (3) times daily. Max Daily Amount: 600 mg. 180 Capsule 4    metroNIDAZOLE (METROGEL) 0.75 % topical gel APPLY TOPICALLY TO THE AFFECTED AREA TWICE DAILY 45 g 1    testosterone enanthate (Xyosted) 75 mg/0.5 mL atIn 0.5 mL by SubCUTAneous route Every Saturday.  cholecalciferol (VITAMIN D3) (1000 Units /25 mcg) tablet Take 2 Tablets by mouth daily. 180 Tablet 2    atorvastatin (LIPITOR) 20 mg tablet TAKE 1 TABLET DAILY 90 Tab 3    Synthroid 100 mcg tablet TAKE 1 TABLET EVERY MORNING 90 Tab 3    clopidogreL (PLAVIX) 75 mg tab TAKE 1 TABLET ONCE DAILY 90 Tab 3    latanoprost (XALATAN) 0.005 % ophthalmic solution Administer 1 Drop to both eyes nightly. No current facility-administered medications on file prior to visit. Allergies   Allergen Reactions    Zocor [Simvastatin] Myalgia     Review of Systems   All other systems reviewed and are negative. Objective:     Vitals:    02/10/22 1537   BP: (!) 142/65   Pulse: 63   Resp: 16   Temp: 97.6 °F (36.4 °C)   TempSrc: Axillary   SpO2: 99%   Weight: 228 lb (103.4 kg)   Height: 5' 8\" (1.727 m)     Physical Exam  Vitals reviewed. HENT:      Head: Normocephalic and atraumatic. Cardiovascular:      Rate and Rhythm: Normal rate and regular rhythm. Heart sounds: Normal heart sounds. Pulmonary:      Effort: Pulmonary effort is normal.      Breath sounds: Normal breath sounds. Neurological:      Mental Status: He is oriented to person, place, and time. Psychiatric:         Behavior: Behavior normal.            Assessment/Plan:       ICD-10-CM ICD-9-CM    1.  Essential hypertension  C81 935.0 METABOLIC PANEL, COMPREHENSIVE      CBC WITH AUTOMATED DIFF      LIPID PANEL      amLODIPine (NORVASC) 2.5 mg tablet   2. Wellness examination  Z00.00 V70.0    3. Other specified hypothyroidism  E03.8 244.8 TSH 3RD GENERATION   4. Encounter for immunization  Z23 V03.89 ZOSTER VACC RECOMBINANT ADJUVANTED   5. Raynaud's disease without gangrene  I73.00 443.0      Wellness   -     Update HCM. Follow-up labs. Shingles vaccine given today. Paresthesia-symptoms in hands and feet. Possibly due to Raynaud's phenomenon per neurology. Follows with neurology and EMG unremarkable for large fiber neuropathy or myopathy. Lumbar mild left greater than right sided radiculopathy reviewed neurology notes. Right greater than left cervical radiculopathy. Will trial amlodipine as his BP has been elevated as well. HTN-BP mildly elevated today in the office once again. Following with cardiology. Low-dose amlodipine ordered. Advised to call with blood pressure log in 5 days. May increase medication if needed. Hypothyroidism-continue current management follow-up labs      Follow-up and Dispositions    · Return in about 3 months (around 5/10/2022) for HTN, Raynauds.             Bg Sanon MD

## 2022-02-15 DIAGNOSIS — L71.9 ROSACEA: ICD-10-CM

## 2022-02-17 RX ORDER — METRONIDAZOLE 7.5 MG/G
GEL TOPICAL
Qty: 45 G | Refills: 1 | Status: SHIPPED | OUTPATIENT
Start: 2022-02-17 | End: 2022-07-22

## 2022-02-24 ENCOUNTER — OFFICE VISIT (OUTPATIENT)
Dept: FAMILY MEDICINE CLINIC | Age: 72
End: 2022-02-24

## 2022-02-24 ENCOUNTER — OFFICE VISIT (OUTPATIENT)
Dept: NEUROLOGY | Age: 72
End: 2022-02-24

## 2022-02-24 ENCOUNTER — HOSPITAL ENCOUNTER (OUTPATIENT)
Dept: PHYSICAL THERAPY | Age: 72
Discharge: HOME | End: 2022-02-24
Payer: COMMERCIAL

## 2022-02-24 VITALS — DIASTOLIC BLOOD PRESSURE: 70 MMHG | SYSTOLIC BLOOD PRESSURE: 136 MMHG | OXYGEN SATURATION: 96 % | HEART RATE: 65 BPM

## 2022-02-24 VITALS
DIASTOLIC BLOOD PRESSURE: 70 MMHG | HEART RATE: 70 BPM | RESPIRATION RATE: 16 BRPM | TEMPERATURE: 97.5 F | WEIGHT: 225.25 LBS | HEIGHT: 68 IN | SYSTOLIC BLOOD PRESSURE: 148 MMHG | BODY MASS INDEX: 34.14 KG/M2 | OXYGEN SATURATION: 98 %

## 2022-02-24 DIAGNOSIS — M54.16 LUMBAR RADICULOPATHY: ICD-10-CM

## 2022-02-24 DIAGNOSIS — R20.2 PARESTHESIA: ICD-10-CM

## 2022-02-24 DIAGNOSIS — J06.9 VIRAL UPPER RESPIRATORY TRACT INFECTION: Primary | ICD-10-CM

## 2022-02-24 DIAGNOSIS — G45.9 TIA (TRANSIENT ISCHEMIC ATTACK): ICD-10-CM

## 2022-02-24 DIAGNOSIS — Z20.822 CLOSE EXPOSURE TO COVID-19 VIRUS: ICD-10-CM

## 2022-02-24 DIAGNOSIS — M54.12 CERVICAL RADICULOPATHY: Primary | ICD-10-CM

## 2022-02-24 PROCEDURE — 99213 OFFICE O/P EST LOW 20 MIN: CPT | Performed by: NURSE PRACTITIONER

## 2022-02-24 PROCEDURE — 97161 PT EVAL LOW COMPLEX 20 MIN: CPT

## 2022-02-24 PROCEDURE — 97140 MANUAL THERAPY 1/> REGIONS: CPT

## 2022-02-24 PROCEDURE — 99214 OFFICE O/P EST MOD 30 MIN: CPT | Performed by: PSYCHIATRY & NEUROLOGY

## 2022-02-24 NOTE — LETTER
2/24/2022    Patient: Carolina Campa   YOB: 1950   Date of Visit: 2/24/2022     Walter Jaeger MD  [address] [place] [phone]  Via [address] [facility] [address]    Dear Walter Jaeger MD,      Thank you for referring Mr. Carolina Campa to [address] [place] [address] for evaluation. My notes for this consultation are attached. If you have questions, please do not hesitate to call me. I look forward to following your patient along with you.       Sincerely,    Guillermo Garcia MD

## 2022-02-24 NOTE — PROGRESS NOTES
Statement of Medical Necessity  Page 1 of 2      Nadine Gallegos 1950 Today's Date: 2/24/2022 KAILEY: [employer]   Payor: BLUE CROSS / Plan: Gume Chol / Product Type: HMO /  Benny Shawn: TBD  Refills: 2               Diagnosis  []   I97.2 Post-Mastectomy Lymphedema [x]   I87.2 Venous Insufficiency   [x]   I89.0 Lymphedema, other secondary  []   I83.019 Venous Stasis Ulcer LE, Right   []   I89.9 Unspecified Lymphatic Disorder []   I83.029 Venous Stasis Ulcer LE, Left   [x]   R60.9 Swelling not relieved by elevation []   Q82.0 Hereditary/ Congenital Lymphedema   []   C50.211 Malignant neoplasm of breast, Right []   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   []   C50.212 Malignant neoplasm of breast, Left []   L03.115 LE Cellulitis, Right   []    []   L03.116 LE Cellulitis, Left                                                           Nadine Gallegos    1950  Page 2 of 2    Physician Order for DME for Diagnosis of secondary lymphedema (I89.0) as Listed on Statement of Medical Necessity, Page 1        Recommended Product:  Units Upper Extremity Rt Lt Units Lower Extremity Rt Lt    Circ-Aid, Ready Wrap, Sigvaris Arm    Inelastic binders (Circ-Aid, Farrow)  []Foot   []Below Knee   []Knee   []Thigh      Circ-Aid Ready Wrap, Sigvaris hand    [place] Miguel A, night use []Full Leg  []Lower Leg      Tribute Arm, night use    Tribute, night use  []Full Leg  []Lower Leg      Cast[place] Miguel A Arm, night use    Olu Sleeve Leg/ Foot, night use      Gradiant Compression Sleeves & Gloves  []Custom [] RM Arm:  []CCL1 []CCL2 []CCL3  []Custom [] RM Glove: []CCL1 []CCL2 []CCL3     4 Gradient Compression Stockings   []Custom  [x]RM Lower Extremity:   [x]CCL1       [x]CCL2         []CCL3   [x]Knee       []Thigh        []Waist Length x x    Olu sleeve arm w/ hand, night use    Tribute Wrap, night use      Compression Bra          Compression Vest         The above patient was referred for treatment of Lymphedema due to the diagnosis indicated above.   Lymphedema is a progressive and chronic condition. It may cause acute infections, muscle atrophy, discomfort, and connective tissue fibrosis with irreversible tissue damage, decreased ROM in the affected limb and diminished functional mobility possibly interfering with independence and ability to work. Recurrent infections and wound complications that commonly occur with Lymphedema often require hospitalization and extensive wound care, thus increasing medical costs. The patient has received complete decongestive therapy which includes manual lymphatic drainage, lymphedema specific exercises, compression bandaging, and hygiene/skin care. Goals of therapy are to reduce the edema and prevent re-accumulation of fluid with its complications. It is medically necessary for this patient to have daytime garments to control this condition. They will need 2 sets (one set to wear and one set to wash for adequate skin care and wearing time). Garments must be replaced every 4-6 months for effectiveness. There are no substitutes available that offer acceptable compression treatment for this Lymphedema patient. If further information is requested, please contact our certified lymphedema therapists at [phone].     [x] Kaden Frederick, PT, DPT, JUSTIN  [] Andree Bradshaw PT, [facility]  [] Olimpia Perkins, PT, DPT, [facility]      Printed  Provider Name Nigel Alpers, MD   Provider Signature  Date    Provider NPI [id number]

## 2022-02-24 NOTE — LETTER
NOTIFICATION RETURN TO WORK / SCHOOL    2/24/2022 8:40 AM    Mr. Dedrick Favre  [address]  [facility] [place] [id number]      To Whom It May Concern:    Dedrick Favre is currently under the care of [address] [place] [address]. He will return to work/school on: 2/28/2022 if COVID negative, 2/29/2022 if COVID positive    If there are questions or concerns please have the patient contact our office.         Sincerely,      Blase Client, NP

## 2022-02-24 NOTE — PROGRESS NOTES
Subjective  Chief Complaint   Patient presents with    Other     sore throat since saturday, sinus problems, sneezing, cough     HPI:  Elizabeth Menezes is a 80 y.o. male. Patient presents with complaint of scratchy sore throat that started Saturday. Tuesday developed sinus congestion, runny nose, sneezing, and coughing. Cough is described as dry. Denies fever, chills, and body aches. Denies SOB, CP, and wheezing. Denies known sick contacts. 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 Home rapid tests negative Sunday and yesterday. Evaluation to date: none  Treatment to date: Contact Cough and Cold is improving symptoms  Relevant PMH: No pertinent additional PMH. Past Medical History:   Diagnosis Date    Benign prostatic hyperplasia 1/15/2021    Blood glucose abnormal 1/15/2021    Essential hypertension 1/15/2021    Hypogonadism in male 1/15/2021    Mixed hyperlipidemia 1/15/2021    Other specified hypothyroidism 1/15/2021    Palpitations     Prediabetes 1/15/2021    Rosacea 1/15/2021    TIA (transient ischemic attack)     Per patient. Negative imgaing.      Family History   Problem Relation Age of Onset    Heart Disease Mother         had complications after heart valve surgery     Bleeding Prob Half-brother         Brain Tumor     Bleeding Prob Half-sister         Bone Cancer     Heart Attack Half-brother         2003    Heart Attack Half-brother         18's     Social History     Socioeconomic History    Marital status:      Spouse name: Not on file    Number of children: Not on file    Years of education: Not on file    Highest education level: Not on file   Occupational History    Not on file   Tobacco Use    Smoking status: Never Smoker    Smokeless tobacco: Never Used   Vaping Use    Vaping Use: Never used   Substance and Sexual Activity    Alcohol use: Never    Drug use: Never    Sexual activity: Not on file   Other Topics Concern    Not on file   Social History Narrative    Not on file     Social Determinants of Health     Financial Resource Strain:     Difficulty of Paying Living Expenses: Not on file   Food Insecurity:     Worried About [address] in the Last Year: Not on file    Raz of Food in the Last Year: Not on file   Transportation Needs:     Lack of Transportation (Medical): Not on file    Lack of Transportation (Non-Medical): Not on file   Physical Activity:     Days of Exercise per Week: Not on file    Minutes of Exercise per Session: Not on file   Stress:     Feeling of Stress : Not on file   Social Connections:     Frequency of Communication with Friends and Family: Not on file    Frequency of Social Gatherings with Friends and Family: Not on file    Attends Anabaptist Services: Not on file    Active Member of [address] [place] [address] or Organizations: Not on file    Attends Club or Organization Meetings: Not on file    Marital Status: Not on file   Intimate Partner Violence:     Fear of Current or Ex-Partner: Not on file    Emotionally Abused: Not on file    Physically Abused: Not on file    Sexually Abused: Not on file   Housing Stability:     Unable to Pay for Housing in the Last Year: Not on file    Number of [place] in the Last Year: Not on file    Unstable Housing in the Last Year: Not on file     Current Outpatient Medications on File Prior to Visit   Medication Sig Dispense Refill    metroNIDAZOLE (METROGEL) 0.75 % topical gel APPLY TOPICALLY TO THE AFFECTED AREA TWICE DAILY 45 g 1    amLODIPine (NORVASC) 2.5 mg tablet Take 1 Tablet by mouth daily. 30 Tablet 0    tadalafiL (CIALIS) 5 mg tablet Take 5 mg by mouth daily.  solifenacin (VESICARE) 10 mg tablet 10 mg.      tamsulosin (FLOMAX) 0.4 mg capsule Take 0.4 mg by mouth.       benazepril-hydroCHLOROthiazide (LOTENSIN HCT) 10-12.5 mg per tablet TAKE 1 TABLET DAILY FOR    BLOOD PRESSURE 90 Tablet 3    gabapentin (NEURONTIN) 100 mg capsule Take 2 Capsules by mouth three (3) times daily. Max Daily Amount: 600 mg. 180 Capsule 4    testosterone enanthate (Xyosted) 75 mg/0.5 mL atIn 0.5 mL by SubCUTAneous route Every Saturday.  cholecalciferol (VITAMIN D3) (1000 Units /25 mcg) tablet Take 2 Tablets by mouth daily. 180 Tablet 2    atorvastatin (LIPITOR) 20 mg tablet TAKE 1 TABLET DAILY 90 Tab 3    Synthroid 100 mcg tablet TAKE 1 TABLET EVERY MORNING 90 Tab 3    clopidogreL (PLAVIX) 75 mg tab TAKE 1 TABLET ONCE DAILY 90 Tab 3    latanoprost (XALATAN) 0.005 % ophthalmic solution Administer 1 Drop to both eyes nightly. No current facility-administered medications on file prior to visit. Allergies   Allergen Reactions    Zocor [Simvastatin] Myalgia     ROS  See HPI for pertinent ROS. Objective  There were no vitals taken for this visit. /70  P 70  RR 16  Temp 97.5  Sats 98%  Weight 225#    Physical Exam  Vitals and nursing note reviewed. Constitutional:       General: He is not in acute distress. Appearance: Normal appearance. He is obese. HENT:      Head: Normocephalic. Nose:      Right Sinus: Frontal sinus tenderness present. Left Sinus: Frontal sinus tenderness present. Eyes:      Extraocular Movements: Extraocular movements intact. Cardiovascular:      Rate and Rhythm: Normal rate and regular rhythm. Heart sounds: Normal heart sounds. Pulmonary:      Effort: Pulmonary effort is normal.      Breath sounds: Normal breath sounds. Musculoskeletal:         General: Normal range of motion. Right lower leg: No edema. Left lower leg: No edema. Lymphadenopathy:      Cervical: No cervical adenopathy. Skin:     General: Skin is warm and dry. Neurological:      Mental Status: He is alert and oriented to person, place, and time.    Psychiatric:         Mood and Affect: Mood normal.         Behavior: Behavior normal.          Assessment & Plan      ICD-10-CM ICD-9-CM 1. Viral upper respiratory tract infection  J06.9 465.9    2. Close exposure to COVID-19 virus  Z20.822 V01.79 NOVEL CORONAVIRUS (COVID-19)     Diagnoses and all orders for this visit:    1. Viral upper respiratory tract infection  We discussed the pros and cons of home testing and retesting today. Agreeable to PCR testing today. Treating for basic viral upper respiratory illness while COVID test is pending. We reviewed the importance of rest, fluids, and over-the-counter medications for symptomatic relief. he should isolate for the time being. If the Covid testing results come back negative he can discontinue the isolation so long that he has been afebrile x24 h. If the Covid testing comes back positive he would continue isolation until he has been afebrile x24 h, it has been 5 days since the onset of symptoms, and symptoms are improving. He should continue to mask at all times for an additional 5 days. If he is not seeing improvement in symptoms over the next week he should contact the office. Feel free to call sooner if any concerns. Work note provided. 2. Close exposure to COVID-19 virus  -     NOVEL CORONAVIRUS (COVID-19)    I have discussed the diagnosis with the patient and the intended plan as seen in the above orders. The patient has received an after-visit summary and questions were answered concerning future plans. I have discussed medication side effects and warnings with the patient as well. Follow-up and Dispositions    · Return if symptoms worsen or fail to improve.            Sharon Romo NP

## 2022-02-24 NOTE — PROGRESS NOTES
Chief Complaint   Patient presents with    Other     cough, sore throat since saturday, sinus problems, runny nose, sneezing   1. Have you been to the ER, urgent care clinic since your last visit? Hospitalized since your last visit? No    2. Have you seen or consulted any other health care providers outside of the [address] [place] [address] since your last visit? Include any pap smears or colon screening.  No   Visit Vitals  BP (!) 148/70 (BP 1 Location: Right upper arm, BP Patient Position: Sitting)   Pulse 70   Temp 97.5 °F (36.4 °C) (Temporal)   Resp 16   Ht 5' 8\" (1.727 m)   Wt 225 lb 4 oz (102.2 kg)   SpO2 98%   BMI 34.25 kg/m²

## 2022-02-24 NOTE — PROGRESS NOTES
NEUROLOGY CLINIC NOTE    Patient ID:  Jefferson Yuan  [id number]  88 y.o.  1950    Date of Visit:  February 24, 2022    Referring Physician: Dr. Madison Quiñones    Reason for Visit:  Hand and feet cold sensation    No chief complaint on file. History of Present Illness:     Patient Active Problem List    Diagnosis Date Noted    Paresthesia 02/10/2022    Cervical radiculopathy 10/08/2021    Lumbar radiculopathy 10/08/2021    LEO (obstructive sleep apnea) 07/20/2021    TIA (transient ischemic attack)     Cervical spondylosis with radiculopathy 03/21/2021    DDD (degenerative disc disease), cervical 03/21/2021    Other spondylosis with radiculopathy, lumbar region 03/21/2021    Benign prostatic hyperplasia 01/15/2021    Essential hypertension 01/15/2021    Hypogonadism in male 01/15/2021    Other specified hypothyroidism 01/15/2021    Mixed hyperlipidemia 01/15/2021    Prediabetes 01/15/2021    Rosacea 01/15/2021    Severe obesity ([address] 09/10/2019     Past Medical History:   Diagnosis Date    Benign prostatic hyperplasia 1/15/2021    Blood glucose abnormal 1/15/2021    Essential hypertension 1/15/2021    Hypogonadism in male 1/15/2021    Mixed hyperlipidemia 1/15/2021    Other specified hypothyroidism 1/15/2021    Palpitations     Prediabetes 1/15/2021    Rosacea 1/15/2021    TIA (transient ischemic attack)     Per patient. Negative imgaing. Past Surgical History:   Procedure Laterality Date    HX CATARACT REMOVAL  08/2021    HX CIRCUMCISION  2001    HX COLONOSCOPY  07/13/2018    2 adenomatous polyps removed -Dr. Meng Board HX COLONOSCOPY  01/01/2012    HX CYST REMOVAL  1963    pilonidal cyst surgery    HX HERNIA REPAIR      HX KNEE REPLACEMENT Bilateral     HX ORTHOPAEDIC      Neuroma on foot removed     HX VASECTOMY      HX VEIN STRIPPING  12/2008    Dr. Niall Del Rosario      Prior to Admission medications    Medication Sig Start Date End Date Taking?  Authorizing Provider metroNIDAZOLE (METROGEL) 0.75 % topical gel APPLY TOPICALLY TO THE AFFECTED AREA TWICE DAILY 2/17/22   Laray Hashimoto, MD   amLODIPine (NORVASC) 2.5 mg tablet Take 1 Tablet by mouth daily. 2/10/22   Laray Hashimoto, MD   tadalafiL (CIALIS) 5 mg tablet Take 5 mg by mouth daily. 1/14/22   Provider, Historical   solifenacin (VESICARE) 10 mg tablet 10 mg. 12/25/21   Provider, Historical   tamsulosin (FLOMAX) 0.4 mg capsule Take 0.4 mg by mouth. 11/22/21   Provider, Historical   benazepril-hydroCHLOROthiazide (LOTENSIN HCT) 10-12.5 mg per tablet TAKE 1 TABLET DAILY FOR    BLOOD PRESSURE 1/9/22   Laray Hashimoto, MD   gabapentin (NEURONTIN) 100 mg capsule Take 2 Capsules by mouth three (3) times daily. Max Daily Amount: 600 mg. 10/21/21   Yadi Flanagan MD   testosterone enanthate Claudia Esteban) 75 mg/0.5 mL atIn 0.5 mL by SubCUTAneous route Every Saturday. Provider, Historical   cholecalciferol (VITAMIN D3) (1000 Units /25 mcg) tablet Take 2 Tablets by mouth daily. 7/8/21   Laray Hashimoto, MD   atorvastatin (LIPITOR) 20 mg tablet TAKE 1 TABLET DAILY 4/19/21   Laray Hashimoto, MD   Synthroid 100 mcg tablet TAKE 1 TABLET EVERY MORNING 4/4/21   Aston Amaral NP   clopidogreL (PLAVIX) 75 mg tab TAKE 1 TABLET ONCE DAILY 4/4/21   Aston Amaral NP   latanoprost (XALATAN) 0.005 % ophthalmic solution Administer 1 Drop to both eyes nightly.     Provider, Historical     Allergies   Allergen Reactions    Zocor [Simvastatin] Myalgia      Social History     Tobacco Use    Smoking status: Never Smoker    Smokeless tobacco: Never Used   Substance Use Topics    Alcohol use: Never      Family History   Problem Relation Age of Onset    Heart Disease Mother         had complications after heart valve surgery     Bleeding Prob Half-brother         Brain Tumor     Bleeding Prob Half-sister         Bone Cancer     Heart Attack Half-brother         2003    Heart Attack Half-brother         1980's        Subjective: Chaim Owens is a 80 y.o. RHWM with history of BPH, HTN, hypogonadism, hyperlipidemia, thyroid disorder, prediabetes and TIA who was referred here by Dr. Alexis Cain for further evaluation of hand and feet cold sensation. Patient is here for follow up. Noticeable for the past 2 years but longer than that. Coldness in the fingers and toes - mid morning or early evening in the winter  Bluish discoloration  Not much during the summertime   Feels unstable. No falls. No weakness or loss of muscle bulk. Issues with ankle swelling better with compression stockings  Some discomfort but no pain  No sense of weakness in the hands or dropping things. 6 months sleeps with the arms up to prevent numbness and tingling. Resting elbow on armrest causes numbness and tingling bilateral 4th and 5th fingers    Teacher at [employer]    Denies any significant alcohol use. History of TIA. Placed on Aggrenox but due to nosebleeds and shifted to clopidrogel 75 mg daily    Patient was seen by his PCP last 1/18/2021. Note mentions pins and needle sensation and tingling in the feet. Less often in the hands. Also notes discoloration of the fingers. Labs done were unremarkable (CBC, CMP, B12, folate, magnesium, lipid panel, TSH and hgbA1c) except for extremely low vitamin D levels. Patient underwent EMG/NCS of bilateral lower extremity 3/2/2021 which revealed mild chronic left at least L5 greater than right lumbar radiculopathy. No evidence of generalized large fiber neuropathy or myopathy. EMG/NCS of bilateral upper extremity done 3/3/2021 revealed right greater than left mild mid to lower cervical radiculopathy. No evidence of entrapment neuropathy, generalized large fiber neuropathy or myopathy. Cervical and lumbar MRIs were ordered. Initially denied by insurance. Cervical x-ray done 3/16/2021 revealed mild multilevel degenerative disc disease and spondylosis most significant at C5-6 and C6-7. Lumbar x-ray done 3/16/2021 revealed multilevel degenerative changes with spondylosis. Patient was informed and referred to physical therapy and advised to take Aleve or Motrin as needed. Review of records revealed patient was admitted [place] [address] last 7/20/2021 and discharged the same day for chest pains, nausea and diaphoresis. Negative EKG and troponin. Elevated TSH at 5.53. Unremarkable CBC, CMP, magnesium, lipase, free T4, CK-MB and index, urinalysis, lipid panel and hemoglobin A1c. Patient was seen by Dr. Tera Briscoe (cardiology) 7/27/2021 and mentions patient has had previous extensive cardiac work-up in 2019. No need to repeat. Monitor for now. Since the last visit on 10/21/2021, patient's gabapentin was increased to 200 mg 3 times daily. He had a brief issues with problem finding words and fatigue but then it went away. He is tolerating the medication well. If he misses the mid day dose, he would feel numbness and tingling in his face. His ability to easily close his fingers is sustained. It continues to help significantly with numbness and tingling that occurred intermittently in his hands and feet. He knowledges has episodes of left neck and shoulder discomforts. Main issue that remains is the send of coldness especially in his left foot with whitish discoloration. Patient is compliant with Plavix 75 mg daily for TIA prophylaxis. Denies any side effects.     Outside reports reviewed: none    Review of Systems:    A comprehensive review of systems was performed:   Constitutional: positive for none  Eyes: positive for none  Ears, nose, mouth, throat, and face: positive for hearing loss  Respiratory: positive for none  Cardiovascular: positive for leg swelling  Gastrointestinal: positive for none  Genitourinary: positive for none  Integument/breast: positive for none  Hematologic/lymphatic: positive for none  Musculoskeletal: positive for joint pain  Neurological: positive for numbness/tingling   Behavioral/Psych: positive for none  Endocrine: positive for none  Allergic/Immunologic: positive for none      Objective:     Visit Vitals  /70 (BP 1 Location: Left arm, BP Patient Position: Sitting, BP Cuff Size: Adult)   Pulse 65   SpO2 96%       PHYSICAL EXAM:    NEUROLOGICAL EXAM:    Appearance: The patient is obese, provides a coherent history and is in no acute distress. Mental Status: Oriented to time, place and person. Fluent, no aphasia or dysarthria. Mood and affect appropriate. Cranial Nerves:   II - XII were intact. Motor:  5/5 strength in upper and lower proximal and distal muscles. Normal bulk and tone. No pronator drift. Reflexes:   Deep tendon reflexes were symmetrical.    Sensory:   Normal to PP. Decrease cold in soles. Profound decrease in vibratory sensation. .   Gait:  Antalgic but steady. No Romberg. Problems tandem walking. Tremor:   No tremor noted. Cerebellar:  Intact FTN/AVANI/HTS. Assessment:   Cervical radiculopathy  Lumbar radiculopathy  Paresthesia  TIA    Plan:   Neurological examination reveals decrease cold sensation in the soles but more profound loss of vibratory sensation causing problems with tandem walking. Previous EMG/NCS of bilateral upper extremity did not reveal any entrapment neuropathy, generalized large fiber neuropathy or myopathy. It did reveal mild right greater than left mid to lower cervical radiculopathy. Cervical x-ray revealed mild multilevel spondylosis from C5-C7. Discussed again symptomatic treatment with medication and continue exercises taught by physical therapy and if continues to persist or progress especially associated weakness then we will proceed with cervical MRI. Patient understood. Continue Gabapentin to 200 mg 3 times daily. I will change it to a 90 day on next refill. [facility]  review was done and patient is compliant. Refill 180 capsules of 100 mg Gabapentin on 2/7/2022.     EMG/NCS of bilateral lower extremity did not reveal any neuropathy or myopathy. Mild chronic left at least L5 greater than right lumbar radiculopathy. Lumbar x-ray revealed degenerative changes and spondylosis. No clear benefit with physical therapy. Continue Gabapentin as above. If condition continues to progress then will obtain lumbar MRI for correlate. Hands and feet numbness and tingling is also suspicious for possible Raynaud's. Monitor for now. History of TIA. Continue Plavix 75 mg daily for stroke prophylaxis. This will be a life time need. Call 911 if new deficits occur. Strict compliance with dietary modifications and medications for HTN and hyperlipidemia. All questions and concerns were answered. This note was created using voice recognition software. Despite editing, there may be syntax errors.

## 2022-02-26 LAB
SARS-COV-2, NAA 2 DAY TAT: NORMAL
SARS-COV-2, NAA: NOT DETECTED

## 2022-03-06 LAB
ALBUMIN SERPL-MCNC: 4.4 G/DL (ref 3.8–4.8)
ALBUMIN SERPL-MCNC: 4.5 G/DL (ref 3.8–4.8)
ALBUMIN/GLOB SERPL: 1.8 {RATIO} (ref 1.2–2.2)
ALP SERPL-CCNC: 85 IU/L (ref 44–121)
ALP SERPL-CCNC: 88 IU/L (ref 44–121)
ALT SERPL-CCNC: 18 IU/L (ref 0–44)
ALT SERPL-CCNC: 20 IU/L (ref 0–44)
AST SERPL-CCNC: 17 IU/L (ref 0–40)
AST SERPL-CCNC: 20 IU/L (ref 0–40)
BASOPHILS # BLD AUTO: 0.1 X10E3/UL (ref 0–0.2)
BASOPHILS NFR BLD AUTO: 1 %
BILIRUB DIRECT SERPL-MCNC: 0.22 MG/DL (ref 0–0.4)
BILIRUB SERPL-MCNC: 0.7 MG/DL (ref 0–1.2)
BILIRUB SERPL-MCNC: 0.7 MG/DL (ref 0–1.2)
BUN SERPL-MCNC: 20 MG/DL (ref 8–27)
BUN/CREAT SERPL: 16 (ref 10–24)
CALCIUM SERPL-MCNC: 9 MG/DL (ref 8.6–10.2)
CHLORIDE SERPL-SCNC: 99 MMOL/L (ref 96–106)
CHOLEST SERPL-MCNC: 110 MG/DL (ref 100–199)
CHOLEST SERPL-MCNC: 112 MG/DL (ref 100–199)
CO2 SERPL-SCNC: 23 MMOL/L (ref 20–29)
CREAT SERPL-MCNC: 1.25 MG/DL (ref 0.76–1.27)
EGFR: 62 ML/MIN/1.73
EOSINOPHIL # BLD AUTO: 0.1 X10E3/UL (ref 0–0.4)
EOSINOPHIL NFR BLD AUTO: 1 %
ERYTHROCYTE [DISTWIDTH] IN BLOOD BY AUTOMATED COUNT: 12 % (ref 11.6–15.4)
GLOBULIN SER CALC-MCNC: 2.5 G/DL (ref 1.5–4.5)
GLUCOSE SERPL-MCNC: 97 MG/DL (ref 65–99)
HCT VFR BLD AUTO: 48.4 % (ref 37.5–51)
HDLC SERPL-MCNC: 35 MG/DL
HDLC SERPL-MCNC: 37 MG/DL
HGB BLD-MCNC: 16.1 G/DL (ref 13–17.7)
IMM GRANULOCYTES # BLD AUTO: 0.1 X10E3/UL (ref 0–0.1)
IMM GRANULOCYTES NFR BLD AUTO: 1 %
IMP & REVIEW OF LAB RESULTS: NORMAL
LDLC SERPL CALC-MCNC: 58 MG/DL (ref 0–99)
LDLC SERPL CALC-MCNC: 62 MG/DL (ref 0–99)
LYMPHOCYTES # BLD AUTO: 1.2 X10E3/UL (ref 0.7–3.1)
LYMPHOCYTES NFR BLD AUTO: 18 %
MCH RBC QN AUTO: 32.1 PG (ref 26.6–33)
MCHC RBC AUTO-ENTMCNC: 33.3 G/DL (ref 31.5–35.7)
MCV RBC AUTO: 96 FL (ref 79–97)
MONOCYTES # BLD AUTO: 0.6 X10E3/UL (ref 0.1–0.9)
MONOCYTES NFR BLD AUTO: 9 %
NEUTROPHILS # BLD AUTO: 4.7 X10E3/UL (ref 1.4–7)
NEUTROPHILS NFR BLD AUTO: 70 %
PLATELET # BLD AUTO: 257 X10E3/UL (ref 150–450)
POTASSIUM SERPL-SCNC: 4.9 MMOL/L (ref 3.5–5.2)
PROT SERPL-MCNC: 6.9 G/DL (ref 6–8.5)
PROT SERPL-MCNC: 7.1 G/DL (ref 6–8.5)
RBC # BLD AUTO: 5.02 X10E6/UL (ref 4.14–5.8)
SODIUM SERPL-SCNC: 139 MMOL/L (ref 134–144)
TRIGL SERPL-MCNC: 74 MG/DL (ref 0–149)
TRIGL SERPL-MCNC: 75 MG/DL (ref 0–149)
TSH SERPL DL<=0.005 MIU/L-ACNC: 1.27 UIU/ML (ref 0.45–4.5)
VLDLC SERPL CALC-MCNC: 15 MG/DL (ref 5–40)
VLDLC SERPL CALC-MCNC: 15 MG/DL (ref 5–40)
WBC # BLD AUTO: 6.6 X10E3/UL (ref 3.4–10.8)

## 2022-03-06 NOTE — PROGRESS NOTES
Call patient advise labs are essentially normal.  Clear normal liver, kidneys, electrolytes, thyroid. No anemia. Cholesterol essentially normal with borderline low good cholesterol.

## 2022-03-07 DIAGNOSIS — E55.9 VITAMIN D DEFICIENCY: ICD-10-CM

## 2022-03-09 RX ORDER — CLOPIDOGREL BISULFATE 75 MG/1
TABLET ORAL
Qty: 90 TABLET | Refills: 3 | Status: SHIPPED | OUTPATIENT
Start: 2022-03-09

## 2022-03-09 RX ORDER — MELATONIN
Qty: 180 TABLET | Refills: 2 | Status: SHIPPED | OUTPATIENT
Start: 2022-03-09

## 2022-03-18 PROBLEM — M50.30 DDD (DEGENERATIVE DISC DISEASE), CERVICAL: Status: ACTIVE | Noted: 2021-03-21

## 2022-03-18 PROBLEM — E66.01 SEVERE OBESITY (HCC): Status: ACTIVE | Noted: 2019-09-10

## 2022-03-18 PROBLEM — I10 ESSENTIAL HYPERTENSION: Status: ACTIVE | Noted: 2021-01-15

## 2022-03-18 PROBLEM — E03.8 OTHER SPECIFIED HYPOTHYROIDISM: Status: ACTIVE | Noted: 2021-01-15

## 2022-03-18 PROBLEM — M54.16 LUMBAR RADICULOPATHY: Status: ACTIVE | Noted: 2021-10-08

## 2022-03-19 PROBLEM — R20.2 PARESTHESIA: Status: ACTIVE | Noted: 2022-02-10

## 2022-03-19 PROBLEM — E78.2 MIXED HYPERLIPIDEMIA: Status: ACTIVE | Noted: 2021-01-15

## 2022-03-19 PROBLEM — M47.26 OTHER SPONDYLOSIS WITH RADICULOPATHY, LUMBAR REGION: Status: ACTIVE | Noted: 2021-03-21

## 2022-03-19 PROBLEM — M54.12 CERVICAL RADICULOPATHY: Status: ACTIVE | Noted: 2021-10-08

## 2022-03-19 PROBLEM — N40.0 BENIGN PROSTATIC HYPERPLASIA: Status: ACTIVE | Noted: 2021-01-15

## 2022-03-20 PROBLEM — M47.22 CERVICAL SPONDYLOSIS WITH RADICULOPATHY: Status: ACTIVE | Noted: 2021-03-21

## 2022-03-20 PROBLEM — L71.9 ROSACEA: Status: ACTIVE | Noted: 2021-01-15

## 2022-03-20 PROBLEM — E29.1 HYPOGONADISM IN MALE: Status: ACTIVE | Noted: 2021-01-15

## 2022-03-20 PROBLEM — G47.33 OSA (OBSTRUCTIVE SLEEP APNEA): Status: ACTIVE | Noted: 2021-07-20

## 2022-03-20 PROBLEM — R73.03 PREDIABETES: Status: ACTIVE | Noted: 2021-01-15

## 2022-03-24 ENCOUNTER — TELEPHONE (OUTPATIENT)
Dept: FAMILY MEDICINE CLINIC | Age: 72
End: 2022-03-24

## 2022-03-24 DIAGNOSIS — I10 ESSENTIAL HYPERTENSION: ICD-10-CM

## 2022-03-24 NOTE — TELEPHONE ENCOUNTER
Pt stated that he needs a refill for Amlodipine for a 90- day supply if you would like him to continue taking this medication. Please send the refill to [facility].

## 2022-03-25 RX ORDER — AMLODIPINE BESYLATE 5 MG/1
5 TABLET ORAL DAILY
Qty: 90 TABLET | Refills: 1 | Status: SHIPPED | OUTPATIENT
Start: 2022-03-25 | End: 2022-07-01 | Stop reason: SDUPTHER

## 2022-03-25 NOTE — TELEPHONE ENCOUNTER
Please call patient and inform that amlodipine was ordered for 90-day supply. I actually increased it to 5 mg from 2.5 mg as his blood pressure was elevated to the he was here for his appointment and when he saw a nurse practitioner edgardo. He is to check his blood pressure at home. He notices if he continues elevated or goes to low he is to let us know.     -CAREY

## 2022-03-30 NOTE — LETTER
2/17/2021 Patient: Yoni Jackson YOB: 1950 Date of Visit: 2/17/2021 Lina Posada MD 
[address] [address] [address] [employer] Dear Lina Posada MD, Thank you for referring Mr. Yoni Jackson to [address] [place] [address] [facility] for evaluation. My notes for this consultation are attached. If you have questions, please do not hesitate to call me. I look forward to following your patient along with you. Sincerely, Brandie Johnson MD 
 

No

## 2022-04-01 ENCOUNTER — PATIENT MESSAGE (OUTPATIENT)
Dept: CARDIOLOGY CLINIC | Age: 72
End: 2022-04-01

## 2022-04-01 DIAGNOSIS — I10 ESSENTIAL HYPERTENSION: Primary | ICD-10-CM

## 2022-04-05 ENCOUNTER — TELEPHONE (OUTPATIENT)
Dept: FAMILY MEDICINE CLINIC | Age: 72
End: 2022-04-05

## 2022-04-05 ENCOUNTER — OFFICE VISIT (OUTPATIENT)
Dept: FAMILY MEDICINE CLINIC | Age: 72
End: 2022-04-05
Payer: COMMERCIAL

## 2022-04-05 VITALS
BODY MASS INDEX: 33.8 KG/M2 | WEIGHT: 223 LBS | TEMPERATURE: 97.3 F | OXYGEN SATURATION: 98 % | HEIGHT: 68 IN | SYSTOLIC BLOOD PRESSURE: 150 MMHG | RESPIRATION RATE: 16 BRPM | HEART RATE: 78 BPM | DIASTOLIC BLOOD PRESSURE: 70 MMHG

## 2022-04-05 DIAGNOSIS — R05.8 DRY COUGH: ICD-10-CM

## 2022-04-05 DIAGNOSIS — H61.21 IMPACTED CERUMEN, RIGHT EAR: ICD-10-CM

## 2022-04-05 DIAGNOSIS — J30.1 SEASONAL ALLERGIC RHINITIS DUE TO POLLEN: Primary | ICD-10-CM

## 2022-04-05 DIAGNOSIS — H65.90 FLUID COLLECTION OF MIDDLE EAR: ICD-10-CM

## 2022-04-05 PROCEDURE — 99214 OFFICE O/P EST MOD 30 MIN: CPT | Performed by: NURSE PRACTITIONER

## 2022-04-05 PROCEDURE — 69210 REMOVE IMPACTED EAR WAX UNI: CPT | Performed by: NURSE PRACTITIONER

## 2022-04-05 RX ORDER — FLUTICASONE PROPIONATE 50 MCG
2 SPRAY, SUSPENSION (ML) NASAL DAILY
Qty: 3 EACH | Refills: 0 | Status: SHIPPED | OUTPATIENT
Start: 2022-04-05 | End: 2022-07-01

## 2022-04-05 RX ORDER — LORATADINE 10 MG/1
10 TABLET ORAL DAILY
Qty: 90 TABLET | Refills: 0 | Status: SHIPPED | OUTPATIENT
Start: 2022-04-05 | End: 2022-07-01

## 2022-04-05 NOTE — PROGRESS NOTES
Chief Complaint   Patient presents with    Cough     allergies   1. Have you been to the ER, urgent care clinic since your last visit? Hospitalized since your last visit? No    2. Have you seen or consulted any other health care providers outside of the [address] [place] [address] since your last visit? Include any pap smears or colon screening.  No   3 most recent PHQ Screens 2/10/2022   Little interest or pleasure in doing things Not at all   Feeling down, depressed, irritable, or hopeless Not at all   Total Score PHQ 2 0     Visit Vitals  BP (!) 150/70 (BP 1 Location: Left upper arm, BP Patient Position: Sitting)   Pulse 78   Temp 97.3 °F (36.3 °C) (Temporal)   Resp 16   Ht 5' 8\" (1.727 m)   Wt 223 lb (101.2 kg)   SpO2 98%   BMI 33.91 kg/m²

## 2022-04-05 NOTE — TELEPHONE ENCOUNTER
Patient was in for same day visit with JJ today and gave a list of his bp readings he has been doing at home. They have been scanned into the media section for you to look at.

## 2022-04-05 NOTE — TELEPHONE ENCOUNTER
Please call patient to have increased amlodipine to 10 mg daily. He is to continue checking his blood pressure. He is already scheduled appointment in May.

## 2022-04-05 NOTE — PROGRESS NOTES
Subjective  Chief Complaint   Patient presents with    Cough     allergies     HPI:  Dallas Caldera is a 80 y.o. male. Patient presents with complaint of dry cough for the past 1 1/2 weeks. Started after mowing the lawn along with congestion and sore throat. Congestion and sore throat are now resolved. Cough is triggered by deep breaths and worse when laying supine. Denies PND. Denies SOB, wheezing, and CP. Denies fever. Evaluation to date: none  Aggravating factors: as above  Reliving factors: Contact  Treatment to date: Contact   Relevant PMH: No pertinent additional PMH. Past Medical History:   Diagnosis Date    Benign prostatic hyperplasia     Blood glucose abnormal     Essential hypertension     Hypogonadism in male     Mixed hyperlipidemia     Other specified hypothyroidism     Palpitations     Prediabetes     Rosacea     TIA (transient ischemic attack)     Per patient. Negative imgaing.      Family History   Problem Relation Age of Onset    Heart Disease Mother         had complications after heart valve surgery     Bleeding Prob Half-brother         Brain Tumor     Bleeding Prob Half-sister         Bone Cancer     Heart Attack Half-brother         2003    Heart Attack Half-brother         18's     Social History     Socioeconomic History    Marital status:      Spouse name: Not on file    Number of children: Not on file    Years of education: Not on file    Highest education level: Not on file   Occupational History    Not on file   Tobacco Use    Smoking status: Never Smoker    Smokeless tobacco: Never Used   Vaping Use    Vaping Use: Never used   Substance and Sexual Activity    Alcohol use: Never    Drug use: Never    Sexual activity: Not on file   Other Topics Concern    Not on file   Social History Narrative    Not on file     Social Determinants of Health     Financial Resource Strain:     Difficulty of Paying Living Expenses: Not on file   Food Insecurity:  Worried About Running Out of Food in the Last Year: Not on file    Raz of Food in the Last Year: Not on file   Transportation Needs:     Lack of Transportation (Medical): Not on file    Lack of Transportation (Non-Medical): Not on file   Physical Activity:     Days of Exercise per Week: Not on file    Minutes of Exercise per Session: Not on file   Stress:     Feeling of Stress : Not on file   Social Connections:     Frequency of Communication with Friends and Family: Not on file    Frequency of Social Gatherings with Friends and Family: Not on file    Attends Protestant Services: Not on file    Active Member of [address] [place] [address] [employer] or Organizations: Not on file    Attends Club or Organization Meetings: Not on file    Marital Status: Not on file   Intimate Partner Violence:     Fear of Current or Ex-Partner: Not on file    Emotionally Abused: Not on file    Physically Abused: Not on file    Sexually Abused: Not on file   Housing Stability:     Unable to Pay for Housing in the Last Year: Not on file    Number of [place] in the Last Year: Not on file    Unstable Housing in the Last Year: Not on file     Current Outpatient Medications on File Prior to Visit   Medication Sig Dispense Refill    amLODIPine (NORVASC) 5 mg tablet Take 1 Tablet by mouth daily. 90 Tablet 1    clopidogreL (PLAVIX) 75 mg tab TAKE 1 TABLET ONCE DAILY 90 Tablet 3    cholecalciferol (VITAMIN D3) (1000 Units /25 mcg) tablet TAKE 2 TABLETS DAILY 180 Tablet 2    metroNIDAZOLE (METROGEL) 0.75 % topical gel APPLY TOPICALLY TO THE AFFECTED AREA TWICE DAILY 45 g 1    tadalafiL (CIALIS) 5 mg tablet Take 5 mg by mouth daily.  solifenacin (VESICARE) 10 mg tablet 10 mg.      tamsulosin (FLOMAX) 0.4 mg capsule Take 0.4 mg by mouth.       benazepril-hydroCHLOROthiazide (LOTENSIN HCT) 10-12.5 mg per tablet TAKE 1 TABLET DAILY FOR    BLOOD PRESSURE 90 Tablet 3    gabapentin (NEURONTIN) 100 mg capsule Take 2 Capsules by mouth three (3) times daily. Max Daily Amount: 600 mg. 180 Capsule 4    testosterone enanthate (Xyosted) 75 mg/0.5 mL atIn 0.5 mL by SubCUTAneous route Every Saturday.  atorvastatin (LIPITOR) 20 mg tablet TAKE 1 TABLET DAILY 90 Tab 3    Synthroid 100 mcg tablet TAKE 1 TABLET EVERY MORNING 90 Tab 3    latanoprost (XALATAN) 0.005 % ophthalmic solution Administer 1 Drop to both eyes nightly. No current facility-administered medications on file prior to visit. Allergies   Allergen Reactions    Zocor [Simvastatin] Myalgia     ROS  See HPI for pertinent ROS. Objective  Visit Vitals  BP (!) 150/70 (BP 1 Location: Left upper arm, BP Patient Position: Sitting)   Pulse 78   Temp 97.3 °F (36.3 °C) (Temporal)   Resp 16   Ht 5' 8\" (1.727 m)   Wt 223 lb (101.2 kg)   SpO2 98%   BMI 33.91 kg/m²       Physical Exam  Vitals and nursing note reviewed. Constitutional:       General: He is not in acute distress. Appearance: Normal appearance. HENT:      Head: Normocephalic. Right Ear: A middle ear effusion is present. There is impacted cerumen. Left Ear: A middle ear effusion is present. Nose: No mucosal edema, congestion or rhinorrhea. Right Turbinates: Not enlarged or swollen. Left Turbinates: Not enlarged or swollen. Eyes:      Extraocular Movements: Extraocular movements intact. Cardiovascular:      Rate and Rhythm: Normal rate and regular rhythm. Heart sounds: Normal heart sounds. Pulmonary:      Effort: Pulmonary effort is normal.      Breath sounds: Normal breath sounds. Musculoskeletal:         General: Normal range of motion. Right lower leg: No edema. Left lower leg: No edema. Lymphadenopathy:      Cervical: No cervical adenopathy. Skin:     General: Skin is warm and dry. Neurological:      Mental Status: He is alert and oriented to person, place, and time.    Psychiatric:         Mood and Affect: Mood normal.         Behavior: Behavior normal. Assessment & Plan      ICD-10-CM ICD-9-CM    1. Seasonal allergic rhinitis due to pollen  J30.1 477.0 fluticasone propionate (FLONASE) 50 mcg/actuation nasal spray      loratadine (Claritin) 10 mg tablet   2. Fluid collection of middle ear  H65.90 381.4    3. Dry cough  R05.8 786.2    4. Impacted cerumen, right ear  H61.21 380.4 REMOVE IMPACTED EAR WAX     Diagnoses and all orders for this visit:    1. Seasonal allergic rhinitis due to pollen  Symptoms are consistent with likely allergic etiology. He does not appear ill and did not cough during visit. Medication as ordered which can be taken alone or in combination. Cautioned it may take up to 2 weeks to notice any improvement in symptoms. Call if symptoms persist.  -     fluticasone propionate (FLONASE) 50 mcg/actuation nasal spray; 2 Sprays by Both Nostrils route daily. -     loratadine (Claritin) 10 mg tablet; Take 1 Tablet by mouth daily. 2. Fluid collection of middle ear  Typically associated with allergic or viral cause. Start medication as ordered. 3. Dry cough  No observed cough today. Advised cough is a symptom we do not treat. Identifying and treating the underlying etiology should improve cough. Start medication as ordered. 4. Impacted cerumen, right ear  Ceruminosis is noted. Wax is removed easily by manual debridement with cerumen curette.   -     REMOVE IMPACTED EAR WAX      Follow-up and Dispositions    · Return if symptoms worsen or fail to improve, for as scheduled 5/17/2022 with AA. I have discussed the diagnosis with the patient and the intended plan as seen in the above orders. The patient has received an after-visit summary and questions were answered concerning future plans. I have discussed medication side effects and warnings with the patient as well.      Francois Ruano NP

## 2022-04-13 DIAGNOSIS — E78.2 MIXED HYPERLIPIDEMIA: ICD-10-CM

## 2022-04-14 RX ORDER — ATORVASTATIN CALCIUM 20 MG/1
TABLET, FILM COATED ORAL
Qty: 90 TABLET | Refills: 3 | Status: SHIPPED | OUTPATIENT
Start: 2022-04-14

## 2022-04-15 DIAGNOSIS — M54.12 CERVICAL RADICULOPATHY: ICD-10-CM

## 2022-04-15 DIAGNOSIS — R20.2 PARESTHESIA: ICD-10-CM

## 2022-04-15 DIAGNOSIS — M54.16 LUMBAR RADICULOPATHY: ICD-10-CM

## 2022-04-15 NOTE — TELEPHONE ENCOUNTER
Patient was calling to check the status of the fax that was sent for his GABAPENTIN medication  Refill request

## 2022-04-18 DIAGNOSIS — M54.12 CERVICAL RADICULOPATHY: ICD-10-CM

## 2022-04-18 DIAGNOSIS — R20.2 PARESTHESIA: ICD-10-CM

## 2022-04-18 DIAGNOSIS — M54.16 LUMBAR RADICULOPATHY: ICD-10-CM

## 2022-04-18 RX ORDER — GABAPENTIN 100 MG/1
200 CAPSULE ORAL 3 TIMES DAILY
Qty: 180 CAPSULE | Refills: 0 | OUTPATIENT
Start: 2022-04-18

## 2022-04-18 RX ORDER — GABAPENTIN 100 MG/1
200 CAPSULE ORAL 3 TIMES DAILY
Qty: 180 CAPSULE | Refills: 5 | Status: SHIPPED | OUTPATIENT
Start: 2022-04-18 | End: 2022-10-21

## 2022-05-06 ENCOUNTER — TELEPHONE (OUTPATIENT)
Dept: FAMILY MEDICINE CLINIC | Age: 72
End: 2022-05-06

## 2022-05-06 NOTE — TELEPHONE ENCOUNTER
Patient dropped off BP readings earlier this week and wants to know if Dr Dioni Perkins has had a chance to look at them. Please advise.

## 2022-05-17 ENCOUNTER — VIRTUAL VISIT (OUTPATIENT)
Dept: FAMILY MEDICINE CLINIC | Age: 72
End: 2022-05-17
Payer: COMMERCIAL

## 2022-05-17 DIAGNOSIS — I10 ESSENTIAL HYPERTENSION: Primary | ICD-10-CM

## 2022-05-17 DIAGNOSIS — I73.00 RAYNAUD'S PHENOMENON WITHOUT GANGRENE: ICD-10-CM

## 2022-05-17 PROCEDURE — 99213 OFFICE O/P EST LOW 20 MIN: CPT | Performed by: STUDENT IN AN ORGANIZED HEALTH CARE EDUCATION/TRAINING PROGRAM

## 2022-05-17 NOTE — PROGRESS NOTES
Chief Complaint   Patient presents with    Follow-up     1. Have you been to the ER, urgent care clinic since your last visit? Hospitalized since your last visit? No    2. Have you seen or consulted any other health care providers outside of the [address] [place] [address] since your last visit? Include any pap smears or colon screening.  No    [phone]

## 2022-06-20 RX ORDER — LEVOTHYROXINE SODIUM 100 UG/1
TABLET ORAL
Qty: 90 TABLET | Refills: 1 | Status: SHIPPED | OUTPATIENT
Start: 2022-06-20

## 2022-07-01 ENCOUNTER — OFFICE VISIT (OUTPATIENT)
Dept: FAMILY MEDICINE CLINIC | Age: 72
End: 2022-07-01
Payer: COMMERCIAL

## 2022-07-01 VITALS
DIASTOLIC BLOOD PRESSURE: 85 MMHG | WEIGHT: 226 LBS | SYSTOLIC BLOOD PRESSURE: 130 MMHG | TEMPERATURE: 97.7 F | OXYGEN SATURATION: 99 % | HEIGHT: 68 IN | HEART RATE: 55 BPM | BODY MASS INDEX: 34.25 KG/M2

## 2022-07-01 DIAGNOSIS — A63.0 PERIANAL WART: Primary | ICD-10-CM

## 2022-07-01 DIAGNOSIS — I10 ESSENTIAL HYPERTENSION: ICD-10-CM

## 2022-07-01 PROCEDURE — 1123F ACP DISCUSS/DSCN MKR DOCD: CPT | Performed by: STUDENT IN AN ORGANIZED HEALTH CARE EDUCATION/TRAINING PROGRAM

## 2022-07-01 PROCEDURE — 99214 OFFICE O/P EST MOD 30 MIN: CPT | Performed by: STUDENT IN AN ORGANIZED HEALTH CARE EDUCATION/TRAINING PROGRAM

## 2022-07-01 RX ORDER — IMIQUIMOD 37.5 MG/G
CREAM TOPICAL
Qty: 28 PACKET | Refills: 1 | Status: SHIPPED | OUTPATIENT
Start: 2022-07-01 | End: 2022-08-04

## 2022-07-01 RX ORDER — AMLODIPINE BESYLATE 10 MG/1
10 TABLET ORAL DAILY
Qty: 90 TABLET | Refills: 3 | Status: SHIPPED | OUTPATIENT
Start: 2022-07-01

## 2022-07-01 NOTE — PROGRESS NOTES
Subjective:     Chief Complaint   Patient presents with    Follow Up Chronic Condition     HPI:  Taylor Liao is a 80 y.o. male. Who presents for follow-up hypertension. Blood pressures well controlled now on amlodipine 10 mg daily, benazepril-hctz. BP log down below. Patient has a skin lesion between his scrotum and anus which irritates and sometimes bleeds. This first noted a couple months ago. Patient Active Problem List    Diagnosis    Raynaud's phenomenon without gangrene    Paresthesia     Hands and feet. Follows with neurology. Possibly due to Raynaud's. EMG negative for neuropathy or myopathy.  Cervical radiculopathy    Lumbar radiculopathy    LEO (obstructive sleep apnea)    TIA (transient ischemic attack)     Per patient. Negative imgaing.  Cervical spondylosis with radiculopathy     Mild spondylosis. Seen on EMG, and XR. Following with neurology.  DDD (degenerative disc disease), cervical     Seen on XR.  Other spondylosis with radiculopathy, lumbar region     Mild spondylosis. Seen on EMG, and XR. Following with neurology.  Benign prostatic hyperplasia    Essential hypertension    Hypogonadism in male    Other specified hypothyroidism    Mixed hyperlipidemia    Prediabetes    Rosacea    Severe obesity ([address]     Past Medical History:   Diagnosis Date    Benign prostatic hyperplasia     Blood glucose abnormal     Essential hypertension     Hypogonadism in male     Mixed hyperlipidemia     Other specified hypothyroidism     Palpitations     Prediabetes     Rosacea     TIA (transient ischemic attack)     Per patient. Negative imgaing.      Family History   Problem Relation Age of Onset    Heart Disease Mother         had complications after heart valve surgery     Bleeding Prob Half-brother         Brain Tumor     Bleeding Prob Half-sister         Bone Cancer     Heart Attack Half-brother         2003    Heart Attack Half-brother         1980's      reports that he has never smoked. He has never used smokeless tobacco. He reports that he does not drink alcohol and does not use drugs. Current Outpatient Medications on File Prior to Visit   Medication Sig Dispense Refill    Synthroid 100 mcg tablet TAKE 1 TABLET EVERY MORNING 90 Tablet 1    gabapentin (NEURONTIN) 100 mg capsule Take 2 Capsules by mouth three (3) times daily. Max Daily Amount: 600 mg. 180 Capsule 5    atorvastatin (LIPITOR) 20 mg tablet TAKE 1 TABLET DAILY 90 Tablet 3    clopidogreL (PLAVIX) 75 mg tab TAKE 1 TABLET ONCE DAILY 90 Tablet 3    cholecalciferol (VITAMIN D3) (1000 Units /25 mcg) tablet TAKE 2 TABLETS DAILY 180 Tablet 2    metroNIDAZOLE (METROGEL) 0.75 % topical gel APPLY TOPICALLY TO THE AFFECTED AREA TWICE DAILY 45 g 1    tadalafiL (CIALIS) 5 mg tablet Take 5 mg by mouth daily.  tamsulosin (FLOMAX) 0.4 mg capsule Take 0.4 mg by mouth.  benazepril-hydroCHLOROthiazide (LOTENSIN HCT) 10-12.5 mg per tablet TAKE 1 TABLET DAILY FOR    BLOOD PRESSURE 90 Tablet 3    testosterone enanthate (Xyosted) 75 mg/0.5 mL atIn 0.5 mL by SubCUTAneous route Every Saturday.  latanoprost (XALATAN) 0.005 % ophthalmic solution Administer 1 Drop to both eyes nightly.  [DISCONTINUED] fluticasone propionate (FLONASE) 50 mcg/actuation nasal spray 2 Sprays by Both Nostrils route daily. (Patient not taking: Reported on 7/1/2022) 3 Each 0    [DISCONTINUED] loratadine (Claritin) 10 mg tablet Take 1 Tablet by mouth daily. (Patient not taking: Reported on 7/1/2022) 90 Tablet 0    [DISCONTINUED] amLODIPine (NORVASC) 5 mg tablet Take 1 Tablet by mouth daily. 90 Tablet 1    [DISCONTINUED] solifenacin (VESICARE) 10 mg tablet 10 mg. (Patient not taking: Reported on 7/1/2022)       No current facility-administered medications on file prior to visit.      Allergies   Allergen Reactions    Zocor [Simvastatin] Myalgia     Review of Systems   All other systems reviewed and are negative. Objective:     Vitals:    07/01/22 1007   BP: 130/85   Pulse: (!) 55   Temp: 97.7 °F (36.5 °C)   SpO2: 99%   Weight: 226 lb (102.5 kg)   Height: 5' 8\" (1.727 m)     Physical Exam  Vitals reviewed. HENT:      Head: Normocephalic and atraumatic. Cardiovascular:      Rate and Rhythm: Normal rate. Pulmonary:      Effort: Pulmonary effort is normal.   Skin:     Comments: Wartlike lesion with stalk noted in the perennial area. Neurological:      Mental Status: He is oriented to person, place, and time. Psychiatric:         Behavior: Behavior normal.            Assessment/Plan:       Diagnoses and all orders for this visit:    1. Perianal wart        -    we will try imiquimod first.  It does not improve we will he can be cut off or frozen  -     Imiquimod 3.75 % crpk; Apply a thin layer once daily (using up to 1 packet or 1 full actuation of pump) prior to bedtime; leave on skin for ~8 hours, then remove with mild soap and water. 2. Essential hypertension       -    BP well controlled. Continue current management. -     amLODIPine (NORVASC) 10 mg tablet; Take 1 Tablet by mouth daily. Follow-up and Dispositions    · Return in about 4 months (around 11/1/2022) for Warts, chronic conditions.           Barb Aviles MD

## 2022-07-01 NOTE — PROGRESS NOTES
Chief Complaint   Patient presents with    Follow Up Chronic Condition       1. \"Have you been to the ER, urgent care clinic since your last visit? Hospitalized since your last visit? \" No    2. \"Have you seen or consulted any other health care providers outside of the [address] [place] [address] since your last visit? \" No     3. For patients aged 39-70: Has the patient had a colonoscopy / FIT/ Cologuard? Yes - no Care Gap present      If the patient is female:    4. For patients aged 41-77: Has the patient had a mammogram within the past 2 years? NA - based on age or sex      11. For patients aged 21-65: Has the patient had a pap smear?  NA - based on age or sex

## 2022-07-22 DIAGNOSIS — L71.9 ROSACEA: ICD-10-CM

## 2022-07-22 RX ORDER — METRONIDAZOLE 7.5 MG/G
GEL TOPICAL
Qty: 45 G | Refills: 1 | Status: SHIPPED | OUTPATIENT
Start: 2022-07-22 | End: 2022-08-04

## 2022-08-01 ENCOUNTER — OFFICE VISIT (OUTPATIENT)
Dept: FAMILY MEDICINE CLINIC | Age: 72
End: 2022-08-01
Payer: COMMERCIAL

## 2022-08-01 ENCOUNTER — TELEPHONE (OUTPATIENT)
Dept: FAMILY MEDICINE CLINIC | Age: 72
End: 2022-08-01

## 2022-08-01 VITALS
DIASTOLIC BLOOD PRESSURE: 56 MMHG | OXYGEN SATURATION: 99 % | SYSTOLIC BLOOD PRESSURE: 123 MMHG | WEIGHT: 222 LBS | HEIGHT: 68 IN | HEART RATE: 66 BPM | TEMPERATURE: 97.6 F | RESPIRATION RATE: 16 BRPM | BODY MASS INDEX: 33.65 KG/M2

## 2022-08-01 DIAGNOSIS — A63.0 PERIANAL WART: Primary | ICD-10-CM

## 2022-08-01 DIAGNOSIS — R21 RASH: ICD-10-CM

## 2022-08-01 PROCEDURE — 1123F ACP DISCUSS/DSCN MKR DOCD: CPT | Performed by: STUDENT IN AN ORGANIZED HEALTH CARE EDUCATION/TRAINING PROGRAM

## 2022-08-01 PROCEDURE — 99213 OFFICE O/P EST LOW 20 MIN: CPT | Performed by: STUDENT IN AN ORGANIZED HEALTH CARE EDUCATION/TRAINING PROGRAM

## 2022-08-01 NOTE — TELEPHONE ENCOUNTER
----- Message from Tatiana Montes MD sent at 7/29/2022  4:57 PM EDT -----  Regarding: Scheduled for Monday  Needs to be seen quickly, please get him squeezed in on Monday afternoon. You can add a slot such as 145, or 2:15.     -CAREY

## 2022-08-01 NOTE — PROGRESS NOTES
Chief Complaint   Patient presents with    Rash     .vs  1. Have you been to the ER, urgent care clinic since your last visit? Hospitalized since your last visit? No    2. Have you seen or consulted any other health care providers outside of the [address] [place] [address] since your last visit? Include any pap smears or colon screening.  No

## 2022-08-04 NOTE — PROGRESS NOTES
Subjective:     Chief Complaint   Patient presents with    Rash     HPI:  Yoni Jackson is a 80 y.o. male who presents for follow-up of perianal wart. He was started on imiquimod for the wart. Initially he was able to tolerate well, but a couple days ago he started noticing hardening of the skin in the area, and possible discharge. He stopped taking the medication and symptoms are resolving. Denies any fever. on physical exam the wart has improved significantly and is unable to be seen, but there is hardening and mild scaliness of the skin  Patient Active Problem List    Diagnosis    Raynaud's phenomenon without gangrene    Paresthesia     Hands and feet. Follows with neurology. Possibly due to Raynaud's. EMG negative for neuropathy or myopathy. Cervical radiculopathy    Lumbar radiculopathy    LEO (obstructive sleep apnea)    TIA (transient ischemic attack)     Per patient. Negative imgaing. Cervical spondylosis with radiculopathy     Mild spondylosis. Seen on EMG, and XR. Following with neurology. DDD (degenerative disc disease), cervical     Seen on XR. Other spondylosis with radiculopathy, lumbar region     Mild spondylosis. Seen on EMG, and XR. Following with neurology. Benign prostatic hyperplasia    Essential hypertension    Hypogonadism in male    Other specified hypothyroidism    Mixed hyperlipidemia    Prediabetes    Rosacea    Severe obesity ([address]     Past Medical History:   Diagnosis Date    Benign prostatic hyperplasia     Blood glucose abnormal     Essential hypertension     Hypogonadism in male     Mixed hyperlipidemia     Other specified hypothyroidism     Palpitations     Prediabetes     Rosacea     TIA (transient ischemic attack)     Per patient. Negative imgaing.      Family History   Problem Relation Age of Onset    Heart Disease Mother         had complications after heart valve surgery     Bleeding Prob Half-brother         Brain Tumor     Bleeding Prob Half-sister         Bone Cancer     Heart Attack Half-brother         2003    Heart Attack Half-brother         1980's      reports that he has never smoked. He has never used smokeless tobacco. He reports that he does not drink alcohol and does not use drugs. Current Outpatient Medications on File Prior to Visit   Medication Sig Dispense Refill    amLODIPine (NORVASC) 10 mg tablet Take 1 Tablet by mouth daily. 90 Tablet 3    Synthroid 100 mcg tablet TAKE 1 TABLET EVERY MORNING 90 Tablet 1    gabapentin (NEURONTIN) 100 mg capsule Take 2 Capsules by mouth three (3) times daily. Max Daily Amount: 600 mg. 180 Capsule 5    atorvastatin (LIPITOR) 20 mg tablet TAKE 1 TABLET DAILY 90 Tablet 3    clopidogreL (PLAVIX) 75 mg tab TAKE 1 TABLET ONCE DAILY 90 Tablet 3    cholecalciferol (VITAMIN D3) (1000 Units /25 mcg) tablet TAKE 2 TABLETS DAILY 180 Tablet 2    tadalafiL (CIALIS) 5 mg tablet Take 5 mg by mouth daily. tamsulosin (FLOMAX) 0.4 mg capsule Take 0.4 mg by mouth.      benazepril-hydroCHLOROthiazide (LOTENSIN HCT) 10-12.5 mg per tablet TAKE 1 TABLET DAILY FOR    BLOOD PRESSURE 90 Tablet 3    testosterone enanthate (Xyosted) 75 mg/0.5 mL atIn 0.5 mL by SubCUTAneous route Every Saturday. latanoprost (XALATAN) 0.005 % ophthalmic solution Administer 1 Drop to both eyes nightly. [DISCONTINUED] metroNIDAZOLE (METROGEL) 0.75 % topical gel APPLY TOPICALLY TO THE AFFECTED AREA TWICE DAILY (Patient not taking: Reported on 8/1/2022) 45 g 1    [DISCONTINUED] Imiquimod 3.75 % crpk Apply a thin layer once daily (using up to 1 packet or 1 full actuation of pump) prior to bedtime; leave on skin for ~8 hours, then remove with mild soap and water. (Patient not taking: Reported on 8/1/2022) 28 Packet 1     No current facility-administered medications on file prior to visit.      Allergies   Allergen Reactions    Imiquimod Rash    Zocor [Simvastatin] Myalgia     Review of Systems   All other systems reviewed and are negative. Objective:     Vitals:    08/01/22 1636   BP: (!) 123/56   Pulse: 66   Resp: 16   Temp: 97.6 °F (36.4 °C)   TempSrc: Axillary   SpO2: 99%   Weight: 222 lb (100.7 kg)   Height: 5' 8\" (1.727 m)     Physical Exam  Skin:     Comments: Perianal: No warts seen, but there is some hardening of the skin, and very mild tenderness. No redness, or pus noted. Assessment/Plan:       Diagnoses and all orders for this visit:    1. Perianal wart    2. Rash  Perianal wart has essentially resolved with imiquimod, but he did have mild to moderate local reaction side effect to the imiquimod. Since stopping imiquimod side affect symptoms have improved including resolving pain, and discomfort. At this point there are no signs of infection, advised patient stay off the imiquimod. If perianal wart recurs we will try cryotherapy. Follow-up and Dispositions    Return if symptoms worsen or fail to improve.           Avila Borges MD

## 2022-08-31 ENCOUNTER — HOSPITAL ENCOUNTER (OUTPATIENT)
Dept: PHYSICAL THERAPY | Age: 72
Discharge: HOME | End: 2022-08-31
Payer: COMMERCIAL

## 2022-08-31 PROCEDURE — 97161 PT EVAL LOW COMPLEX 20 MIN: CPT

## 2022-08-31 PROCEDURE — 97760 ORTHOTIC MGMT&TRAING 1ST ENC: CPT

## 2022-08-31 NOTE — PROGRESS NOTES
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 [x]   I87.2 Venous Insufficiency   [x]   I89.0 Lymphedema, other secondary  []   I83.019 Venous Stasis Ulcer LE, Right   []   I89.9 Unspecified Lymphatic Disorder []   I83.029 Venous Stasis Ulcer LE, Left   [x]   R60.9 Swelling not relieved by elevation []   Q82.0 Hereditary/ Congenital Lymphedema   []   C50.211 Malignant neoplasm of breast, Right []   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   []   C50.212 Malignant neoplasm of breast, Left []   L03.115 LE Cellulitis, Right   []    []   L03.116 LE Cellulitis, Left                                                           Bhavesh Smith    1950  Page 2 of 2    Physician Order for DME for Diagnosis of Secondary Lymphedema as Listed on Statement of Medical Necessity, Page 1        Recommended Product:  Units Upper Extremity Rt Lt Units Lower Extremity Rt Lt    Circ-Aid, Ready Wrap, Sigvaris Arm    Inelastic binders (Circ-Aid, Farrow)  []Foot   []Below Knee   []Knee   []Thigh      Circ-Aid Ready Wrap, Sigvaris hand    [place] A, night use []Full Leg  []Lower Leg      Tribute Arm, night use    Tribute, night use  []Full Leg  []Lower Leg      Cast[place] Miguel A Arm, night use    Olu Sleeve Leg/ Foot, night use      Gradiant Compression Sleeves & Gloves  []Custom [] RM Arm:  []CCL1 []CCL2 []CCL3  []Custom [] RM Glove: []CCL1 []CCL2 []CCL3     4 Gradient Compression Stockings   []Custom  [x]RM Lower Extremity:   [x]CCL1       [x]CCL2         []CCL3   [x]Knee       []Thigh        []Waist Length x x    Olu sleeve arm w/ hand, night use    Tribute Wrap, night use      Compression Bra          Compression Vest         The above patient was referred for treatment of Lymphedema due to the diagnosis indicated above.   Lymphedema is a progressive and chronic condition. It may cause acute infections, muscle atrophy, discomfort, and connective tissue fibrosis with irreversible tissue damage, decreased ROM in the affected limb and diminished functional mobility possibly interfering with independence and ability to work. Recurrent infections and wound complications that commonly occur with Lymphedema often require hospitalization and extensive wound care, thus increasing medical costs. The patient has received complete decongestive therapy which includes manual lymphatic drainage, lymphedema specific exercises, compression bandaging, and hygiene/skin care. Goals of therapy are to reduce the edema and prevent re-accumulation of fluid with its complications. It is medically necessary for this patient to have daytime garments to control this condition. They will need 2 sets (one set to wear and one set to wash for adequate skin care and wearing time). Garments must be replaced every 4-6 months for effectiveness. There are no substitutes available that offer acceptable compression treatment for this Lymphedema patient. If further information is requested, please contact our certified lymphedema therapists at [phone].     [x] Elias Stuart, PT, DPT, CLT-OTTONIEL  [] Jenny Pollock, PT, [facility]  [] Prem Hauser, PT, DPT, CLT-OTTONIEL  [] Walter Johnston, PT, DPT, CLT    Printed  Provider Name Janey Mann MD Provider Signature  Date    Provider NPI [id number]

## 2022-08-31 NOTE — PROGRESS NOTES
[facility]  Lymphedema Clinic and Cancer Rehabilitation  [address] [place] [address].  [address]  [address]      INITIAL EVALUATION    NAME: Alonzo Olvera AGE: 71 y.o. GENDER: male  DATE: 08/31/22  REFERRING PHYSICIAN: Jose Baldwin MD (cardiology)  HISTORY AND BACKGROUND:  Pt is a 72 yo male returning today for 6 month re-evaluation of bilateral LE secondary lymphedema. Pt continues to wear Juzo 3512 knee high stockings every day from morning until evening. Pt says he is able to don/doff stockings independently using medi donning cerna and dycem. Pt denies changes to medical history since last visit. From prior evaluation: Pt reports 20+ year history of fluctuating LE edema, mostly in the foot and ankle, exacerbated by varicose veins in the last 3 years. Pt with history of B TKA and neuroma on right foot. Previous doppler negative for DVT. Primary Diagnosis:  BLE lymphedema, secondary (I89.0)  Other Treatment Diagnoses:  Swelling not relieved by elevation (R60.0 localized edema)  Venous insufficiency (I87.2)   Date of Onset: reports a 20+ year history of fluctuating LE edema, mostly in the foot and ankle. In the past few years exacerbated by varicose issues  Present Symptoms and Functional Limitations: LE swelling has fluctuations and difficulty with wearing shoes. Reports improvement since initiating stocking wear in July 2020. Lower Extremity Functional Scale: 12/68, 18% impairment     Past Medical History:   Past Medical History:   Diagnosis Date    Benign prostatic hyperplasia     Blood glucose abnormal     Essential hypertension     Hypogonadism in male     Mixed hyperlipidemia     Other specified hypothyroidism     Palpitations     Prediabetes     Rosacea     TIA (transient ischemic attack)     Per patient. Negative imgaing.      Past Surgical History:   Procedure Laterality Date    HX CATARACT REMOVAL  08/2021    HX CIRCUMCISION 2001    HX COLONOSCOPY  07/13/2018    2 adenomatous polyps removed -Dr. Amy Epps COLONOSCOPY  01/01/2012    HX CYST REMOVAL  1963    pilonidal cyst surgery    HX HERNIA REPAIR      HX KNEE REPLACEMENT Bilateral     HX ORTHOPAEDIC      Neuroma on foot removed     HX VASECTOMY      HX VEIN STRIPPING  12/2008    Dr. Beba Shaffer     Current Medications:    Current Outpatient Medications   Medication Sig    amLODIPine (NORVASC) 10 mg tablet Take 1 Tablet by mouth daily. Synthroid 100 mcg tablet TAKE 1 TABLET EVERY MORNING    gabapentin (NEURONTIN) 100 mg capsule Take 2 Capsules by mouth three (3) times daily. Max Daily Amount: 600 mg.    atorvastatin (LIPITOR) 20 mg tablet TAKE 1 TABLET DAILY    clopidogreL (PLAVIX) 75 mg tab TAKE 1 TABLET ONCE DAILY    cholecalciferol (VITAMIN D3) (1000 Units /25 mcg) tablet TAKE 2 TABLETS DAILY    tadalafiL (CIALIS) 5 mg tablet Take 5 mg by mouth daily. tamsulosin (FLOMAX) 0.4 mg capsule Take 0.4 mg by mouth.    benazepril-hydroCHLOROthiazide (LOTENSIN HCT) 10-12.5 mg per tablet TAKE 1 TABLET DAILY FOR    BLOOD PRESSURE    testosterone enanthate (Xyosted) 75 mg/0.5 mL atIn 0.5 mL by SubCUTAneous route Every Saturday. latanoprost (XALATAN) 0.005 % ophthalmic solution Administer 1 Drop to both eyes nightly. No current facility-administered medications for this encounter. Allergies: Allergies   Allergen Reactions    Imiquimod Rash    Zocor [Simvastatin] Myalgia      Prior Level of Function/Social/Work History/Personal factors and/or comorbidities impacting plan of care: pt recently retired from [employer]; pt helps to take care of school age grandchildren after school. Living Situation: home      Trainable Caregiver?: wife   Self-care/ADLs: independent     Mobility: independent   Sleeping Arrangement:  In a bed   Adaptive Equipment Owned: none   Other:   Previous Therapy:  Seen at Lymphedema clinic in July 2020, re-evals every 6 months. Compression/Lymphedema Equipment:  [employer] 3512 knee high stockings, size III regular, closed toe (2 pair about 6 months old). SUBJECTIVE:   Patient reports good tolerance to knee high stockings and feels swelling is stable. Patients goals for therapy: \"keep swelling under control,\" recheck volumes and obtain new compression garments. OBJECTIVE DATA SUMMARY:   EXAMINATION/PRESENTATION/DECISION MAKING:   Pain:   0/10        Self-Care and ADLs:  Grooming: Independent  Bathing: Independent    UB Dressing: Independent  LB Dressing: Independent    Don/Doff Shoes/Socks:  Independent  Toileting: Independent    Other:       Skin and Tissue Assessment:  Dermal Status:  [x]   Intact []  Dry   []  Tenuous [] Flaky   []  Wound/lesion present [x]  Scars: B TKA   []  Dermatitis    Texture/Consistency:  []  Boggy []  Pitting Edema: 1+ pitting lower legs, malleolar region    []  Brawny []  Combination   []  Fibrotic/Woody    Pigmentation/Color Change:  []  Normal []  Hemosiderin   []  Red []  Erythematous   [x]  Hyperpigmented: around feet/ankles  []  Hyperlipodermatosclerosis   Anomalies:  []  Lymphorrhea []  Vesicles   []  Petechiae []  Warty Vercusis   []  Bullae []  Papilloma   Circulatory:  []  XEAN [x]  Varicosities: varicous veins present   [x]  Pulses palpable  [x]  Vascular studies ruled out DVT in past   []  DVT History    Nails:  [x]   Normal  []   Fungus  Stemmers Sign: negative  Height:   5'8\"  Weight:   223 lbs  BMI:   33.9  (36 or greater: adversely affecting lymphedema)  Wound/Ulcer:  no      Wound Pain:   none  Volumetric Measurements:   Date:  Right Left % difference    8/31/22 3311.17 3175.09 -4.11   2/24/22 3154.79 3112.82 -1.33   7/22/20 3532.05 3420.34 -3.16      Range of Motion: WFL  Strength: WFL  Sensation:  Intact  Mobility:  Bed/Chair Mobility:  Independent  Transfers:  Independent    Sitting Balance:  intact Standing Balance:  intact   Gait:  Independent  Wheelchair Mobility:  n/a   Endurance: good Stairs:  Independent          Safety:  Patient is alert and oriented:  x4   Safety awareness:  yes   Fall Risk?:  no   Patient given written fall prevention handout: Yes   Precautions:  Standard lymphedema precautions to include avoiding blood pressure readings, injections and IVs or other procedures/acts that could lead to broken skin on affected area, and avoiding excessive heat, resistive activity or altitude without compression garment    Physical Therapy Evaluation Charge Determination   History Examination Presentation Decision-Making   MEDIUM  Complexity : 1-2 comorbidities / personal factors will impact the outcome/ POC  LOW Complexity : 1-2 Standardized tests and measures addressing body structure, function, activity limitation and / or participation in recreation  LOW Complexity : Stable, uncomplicated  Other outcome measures LLIS  LOW       Based on the above components, the patient evaluation is determined to be of the following complexity level: LOW     Evaluation Time: 10:00 - 10:20 am (20 minutes)    TREATMENT PROVIDED:   1. Treatment description:  Orthotic management x 1  Patient with combination edema complicated by vascular insufficiency. Reviewed limb volumes as above and garment measurements. Patient measured for replacement Juzo 3512 CCL2 knee high stockings, beige and black, closed toe for 1 pair, open toe for 2nd pair, bilateral LE, size III regular. Pt asked to order 1 pair of open toe due to some discomfort at the toes when arthritis flares up. Pt shown use of Juzo slippie as an option for donning vs cerna with open toe stockings. DME order sent to vendor. Explained to pt that this may take several weeks to receive stockings due to need for signed eval/LMN from MD and then obtaining insurance authorization. Pt to fit garments on his own and contact clinic with any issues prior to next 6 month re-evaluation.    Treatment time:  10:20 - 10:35 am  Minutes: 15    ASSESSMENT:   Duke Esparza is a 71 y.o. male who presents with Stage II combination edema with venous insufficiency. Patient's limb volumes have reduced since transitioning to Juzo dynamic stockings in July 2020. Patient's leg discrepancy 4.1% and able to be measured for replacement RM garments, Juzo 3512 size III regular, closed toe for one pair, open toe for second pair. DME order sent to vendor. Explained to pt that this may take several weeks to receive stockings due to need for signed eval/LMN from MD and then obtaining insurance authorization. Pt to fit garments on his own since no change in size/style indicated and return to clinic in 6 months for re-evaluation. Patient continues to benefit from aspects of complete decongestive therapy (CDT) including manual lymphatic drainage (MLD) technique, short-stretch compression system to maintain limb volumes, and kinesiotaping as appropriate. Patient will receive instruction in proper skin care to recognize signs/symptoms of and prevent infection, therapeutic exercise, and self-MLD for independent home program and restorative lymphatic performance. This care is medically necessary due to the infection risk with lymphedema, and to improve functional activities. CDT is necessary to resolve swelling to allow patient to return to wearing normal clothes/footwear, and prevent worsening of symptoms, such as venous stasis ulcerations, infections, or hospitalizations. Patient will be independent with home program strategies to allow improved ADL ability and mobility and to allow patient to return to greatest functional independence. PLAN OF CARE:   Recommendations and Planned Interventions:  No f/u visits indicated. Pt to return in 6 months for re-evaluation or sooner as needed. Patient has participated in goal setting and agrees to work toward plan of care. Patient was instructed to call if questions or concerns arise. Thank you for this referral.  Donny Antonio.  Sera, PT, DPT, CLT-OTTONIEL    Time Calculation: 35 mins    TREATMENT PLAN EFFECTIVE DATES:   8/31/2022 TO 11/23/2022  I have read the above plan of care for [address] - [facility] DESTINEE WELLS. I certify the above prescribed services are required by this patient and are medically necessary.   The above plan of care has been developed in conjunction with the lymphedema/physical therapist.       Physician Signature: ____________________________________Date:______________

## 2022-09-03 LAB
ALBUMIN SERPL-MCNC: 4.2 G/DL (ref 3.7–4.7)
ALP SERPL-CCNC: 74 IU/L (ref 44–121)
ALT SERPL-CCNC: 16 IU/L (ref 0–44)
AST SERPL-CCNC: 18 IU/L (ref 0–40)
BILIRUB DIRECT SERPL-MCNC: 0.19 MG/DL (ref 0–0.4)
BILIRUB SERPL-MCNC: 0.7 MG/DL (ref 0–1.2)
CHOLEST SERPL-MCNC: 127 MG/DL (ref 100–199)
HDLC SERPL-MCNC: 38 MG/DL
IMP & REVIEW OF LAB RESULTS: NORMAL
LDLC SERPL CALC-MCNC: 67 MG/DL (ref 0–99)
PROT SERPL-MCNC: 6.5 G/DL (ref 6–8.5)
TRIGL SERPL-MCNC: 120 MG/DL (ref 0–149)
VLDLC SERPL CALC-MCNC: 22 MG/DL (ref 5–40)

## 2022-09-07 DIAGNOSIS — Z13.220 SCREENING CHOLESTEROL LEVEL: Primary | ICD-10-CM

## 2022-09-07 DIAGNOSIS — I10 ESSENTIAL HYPERTENSION: ICD-10-CM

## 2022-09-09 ENCOUNTER — OFFICE VISIT (OUTPATIENT)
Dept: NEUROLOGY | Age: 72
End: 2022-09-09
Payer: COMMERCIAL

## 2022-09-09 VITALS
RESPIRATION RATE: 20 BRPM | DIASTOLIC BLOOD PRESSURE: 72 MMHG | SYSTOLIC BLOOD PRESSURE: 140 MMHG | HEART RATE: 57 BPM | OXYGEN SATURATION: 99 %

## 2022-09-09 DIAGNOSIS — R20.2 PARESTHESIA: ICD-10-CM

## 2022-09-09 DIAGNOSIS — M54.12 CERVICAL RADICULOPATHY: Primary | ICD-10-CM

## 2022-09-09 DIAGNOSIS — M54.16 LUMBAR RADICULOPATHY: ICD-10-CM

## 2022-09-09 DIAGNOSIS — G45.9 TIA (TRANSIENT ISCHEMIC ATTACK): ICD-10-CM

## 2022-09-09 PROCEDURE — 99214 OFFICE O/P EST MOD 30 MIN: CPT | Performed by: PSYCHIATRY & NEUROLOGY

## 2022-09-09 PROCEDURE — 1123F ACP DISCUSS/DSCN MKR DOCD: CPT | Performed by: PSYCHIATRY & NEUROLOGY

## 2022-09-09 NOTE — PROGRESS NOTES
Pretty good overall, increased dosage of medication not able to tolerate side effects, for TID   Instability walking wasn't horrible, early evening late evening he was always sleeping   So he dropped back to the 1 tablet TID instead of 2 tablets

## 2022-09-09 NOTE — LETTER
9/12/2022    Patient: Alonzo Olvera   YOB: 1950   Date of Visit: 9/9/2022     Barb Aviles MD  [address] [address]  [address] [facility] [address]    Dear Barb Aviles MD,      Thank you for referring Mr. Alonzo Olvera to [facility] for evaluation. My notes for this consultation are attached. If you have questions, please do not hesitate to call me. I look forward to following your patient along with you.       Sincerely,    Salinas Suazo MD

## 2022-09-15 DIAGNOSIS — U07.1 COVID-19 VIRUS INFECTION: Primary | ICD-10-CM

## 2022-10-21 ENCOUNTER — OFFICE VISIT (OUTPATIENT)
Dept: FAMILY MEDICINE CLINIC | Age: 72
End: 2022-10-21
Payer: MEDICARE

## 2022-10-21 VITALS
DIASTOLIC BLOOD PRESSURE: 88 MMHG | OXYGEN SATURATION: 98 % | HEIGHT: 68 IN | BODY MASS INDEX: 32.31 KG/M2 | HEART RATE: 57 BPM | WEIGHT: 213.2 LBS | SYSTOLIC BLOOD PRESSURE: 120 MMHG | TEMPERATURE: 97.1 F

## 2022-10-21 DIAGNOSIS — L02.415 ABSCESS OF RIGHT THIGH: Primary | ICD-10-CM

## 2022-10-21 PROCEDURE — G8754 DIAS BP LESS 90: HCPCS | Performed by: NURSE PRACTITIONER

## 2022-10-21 PROCEDURE — 1123F ACP DISCUSS/DSCN MKR DOCD: CPT | Performed by: NURSE PRACTITIONER

## 2022-10-21 PROCEDURE — 1101F PT FALLS ASSESS-DOCD LE1/YR: CPT | Performed by: NURSE PRACTITIONER

## 2022-10-21 PROCEDURE — G8427 DOCREV CUR MEDS BY ELIG CLIN: HCPCS | Performed by: NURSE PRACTITIONER

## 2022-10-21 PROCEDURE — G8432 DEP SCR NOT DOC, RNG: HCPCS | Performed by: NURSE PRACTITIONER

## 2022-10-21 PROCEDURE — G8752 SYS BP LESS 140: HCPCS | Performed by: NURSE PRACTITIONER

## 2022-10-21 PROCEDURE — G8536 NO DOC ELDER MAL SCRN: HCPCS | Performed by: NURSE PRACTITIONER

## 2022-10-21 PROCEDURE — G8417 CALC BMI ABV UP PARAM F/U: HCPCS | Performed by: NURSE PRACTITIONER

## 2022-10-21 PROCEDURE — 99214 OFFICE O/P EST MOD 30 MIN: CPT | Performed by: NURSE PRACTITIONER

## 2022-10-21 PROCEDURE — 3017F COLORECTAL CA SCREEN DOC REV: CPT | Performed by: NURSE PRACTITIONER

## 2022-10-21 RX ORDER — SULFAMETHOXAZOLE AND TRIMETHOPRIM 800; 160 MG/1; MG/1
1 TABLET ORAL 2 TIMES DAILY
Qty: 14 TABLET | Refills: 0 | Status: SHIPPED | OUTPATIENT
Start: 2022-10-21 | End: 2022-10-28

## 2022-10-21 NOTE — PROGRESS NOTES
Subjective  Chief Complaint   Patient presents with    Follow-up     Bump, painful near groin area     HPI:  Dk Acosta is a 71 y.o. male. Patient presents with complaint of possible cyst to right inner thigh for the past 4-6 weeks. Reports discharge and tenderness. Drainage and tenderness are improving. Cyst is smaller now. Evaluation to date: none  Treatment to date: nothing  Relevant PMH: perineal warts, cysts in the past that typically resolve without intervention    Past Medical History:   Diagnosis Date    Benign prostatic hyperplasia     Blood glucose abnormal     Essential hypertension     Hypogonadism in male     Mixed hyperlipidemia     Other specified hypothyroidism     Palpitations     Prediabetes     Rosacea     TIA (transient ischemic attack)     Per patient. Negative imgaing.      Family History   Problem Relation Age of Onset    Heart Disease Mother         had complications after heart valve surgery     Bleeding Prob Half-brother         Brain Tumor     Bleeding Prob Half-sister         Bone Cancer     Heart Attack Half-brother         2003    Heart Attack Half-brother         18's     Social History     Socioeconomic History    Marital status:      Spouse name: Not on file    Number of children: Not on file    Years of education: Not on file    Highest education level: Not on file   Occupational History    Not on file   Tobacco Use    Smoking status: Never    Smokeless tobacco: Never   Vaping Use    Vaping Use: Never used   Substance and Sexual Activity    Alcohol use: Never    Drug use: Never    Sexual activity: Not on file   Other Topics Concern    Not on file   Social History Narrative    Not on file     Social Determinants of Health     Financial Resource Strain: Low Risk     Difficulty of Paying Living Expenses: Not hard at all   Food Insecurity: No Food Insecurity    Worried About [address] in the Last Year: Never true    [facility] in the Last Year: Never true Transportation Needs: Not on file   Physical Activity: Not on file   Stress: Not on file   Social Connections: Not on file   Intimate Partner Violence: Not on file   Housing Stability: Not on file     Current Outpatient Medications on File Prior to Visit   Medication Sig Dispense Refill    amLODIPine (NORVASC) 10 mg tablet Take 1 Tablet by mouth daily. 90 Tablet 3    Synthroid 100 mcg tablet TAKE 1 TABLET EVERY MORNING 90 Tablet 1    atorvastatin (LIPITOR) 20 mg tablet TAKE 1 TABLET DAILY 90 Tablet 3    clopidogreL (PLAVIX) 75 mg tab TAKE 1 TABLET ONCE DAILY 90 Tablet 3    cholecalciferol (VITAMIN D3) (1000 Units /25 mcg) tablet TAKE 2 TABLETS DAILY 180 Tablet 2    tadalafiL (CIALIS) 5 mg tablet Take 5 mg by mouth daily. benazepril-hydroCHLOROthiazide (LOTENSIN HCT) 10-12.5 mg per tablet TAKE 1 TABLET DAILY FOR    BLOOD PRESSURE 90 Tablet 3    testosterone enanthate (Xyosted) 75 mg/0.5 mL atIn 0.5 mL by SubCUTAneous route Every Saturday. latanoprost (XALATAN) 0.005 % ophthalmic solution Administer 1 Drop to both eyes nightly. [DISCONTINUED] nirmatrelvir-ritonavir (Paxlovid, EUA,) 300 mg (150 mg x 2)-100 mg Take 3 Tablets by mouth every twelve (12) hours. (Patient not taking: Reported on 10/21/2022) 1 Box 0    [DISCONTINUED] gabapentin (NEURONTIN) 100 mg capsule Take 2 Capsules by mouth three (3) times daily. Max Daily Amount: 600 mg. (Patient not taking: Reported on 10/21/2022) 180 Capsule 5     No current facility-administered medications on file prior to visit. Allergies   Allergen Reactions    Imiquimod Rash    Zocor [Simvastatin] Myalgia       Objective  Visit Vitals  /88 (BP 1 Location: Left arm, BP Patient Position: Sitting, BP Cuff Size: Adult)   Pulse (!) 57   Temp 97.1 °F (36.2 °C) (Temporal)   Ht 5' 8\" (1.727 m)   Wt 213 lb 3.2 oz (96.7 kg)   SpO2 98%   BMI 32.42 kg/m²       Physical Exam  Vitals and nursing note reviewed.    Constitutional:       General: He is not in acute distress. Appearance: Normal appearance. HENT:      Head: Normocephalic. Eyes:      Extraocular Movements: Extraocular movements intact. Cardiovascular:      Rate and Rhythm: Normal rate. Pulmonary:      Effort: Pulmonary effort is normal.   Genitourinary:      Musculoskeletal:         General: Normal range of motion. Right lower leg: No edema. Left lower leg: No edema. Skin:     General: Skin is warm and dry. Neurological:      Mental Status: He is alert and oriented to person, place, and time. Psychiatric:         Mood and Affect: Mood normal.         Behavior: Behavior normal.        Assessment & Plan      ICD-10-CM ICD-9-CM    1. Abscess of right thigh  L02.415 682.6 trimethoprim-sulfamethoxazole (BACTRIM DS, SEPTRA DS) 160-800 mg per tablet        Diagnoses and all orders for this visit:    1. Abscess of right thigh  Medication as ordered. Apply warm compresses 2-3 times per day followed by gentle expression of discharge to fill facilitate drainage. Call if symptoms persist.  -     trimethoprim-sulfamethoxazole (BACTRIM DS, SEPTRA DS) 160-800 mg per tablet; Take 1 Tablet by mouth two (2) times a day for 7 days. Aspects of this note may have been generated using voice recognition software. Despite editing, there may be some syntax errors. Follow-up and Dispositions    Return in about 6 weeks (around 12/2/2022) for MCW, fasting labs, follow up, chronic conditions/meds with [facility] . I have discussed the diagnosis with the patient and the intended plan as seen in the above orders. The patient has received an after-visit summary and questions were answered concerning future plans. I have discussed medication side effects and warnings with the patient as well.     Shanique Paul NP

## 2022-10-21 NOTE — PROGRESS NOTES
Chief Complaint   Patient presents with    Follow-up     Bump, painful near groin area   Visit Vitals  /88 (BP 1 Location: Left arm, BP Patient Position: Sitting, BP Cuff Size: Adult)   Pulse (!) 57   Temp 97.1 °F (36.2 °C) (Temporal)   Ht 5' 8\" (1.727 m)   Wt 213 lb 3.2 oz (96.7 kg)   SpO2 98%   BMI 32.42 kg/m²       1. \"Have you been to the ER, urgent care clinic since your last visit? Hospitalized since your last visit? \" No    2. \"Have you seen or consulted any other health care providers outside of the [address] [place] [address] since your last visit? \" No     3. For patients aged 39-70: Has the patient had a colonoscopy / FIT/ Cologuard? Yes - no Care Gap present      If the patient is female:    4. For patients aged 41-77: Has the patient had a mammogram within the past 2 years? NA - based on age or sex      11. For patients aged 21-65: Has the patient had a pap smear?  NA - based on age or sex  3 most recent PHQ Screens 10/21/2022   Little interest or pleasure in doing things Not at all   Feeling down, depressed, irritable, or hopeless Not at all   Total Score PHQ 2 0

## 2022-11-18 ENCOUNTER — OFFICE VISIT (OUTPATIENT)
Dept: CARDIOLOGY CLINIC | Age: 72
End: 2022-11-18
Payer: MEDICARE

## 2022-11-18 VITALS
WEIGHT: 214 LBS | HEART RATE: 57 BPM | DIASTOLIC BLOOD PRESSURE: 60 MMHG | BODY MASS INDEX: 32.43 KG/M2 | SYSTOLIC BLOOD PRESSURE: 136 MMHG | OXYGEN SATURATION: 98 % | HEIGHT: 68 IN

## 2022-11-18 DIAGNOSIS — R60.0 BILATERAL LEG EDEMA: ICD-10-CM

## 2022-11-18 DIAGNOSIS — R00.2 PALPITATIONS: ICD-10-CM

## 2022-11-18 DIAGNOSIS — I10 ESSENTIAL HYPERTENSION: Primary | ICD-10-CM

## 2022-11-18 PROCEDURE — G8754 DIAS BP LESS 90: HCPCS | Performed by: SPECIALIST

## 2022-11-18 PROCEDURE — G8417 CALC BMI ABV UP PARAM F/U: HCPCS | Performed by: SPECIALIST

## 2022-11-18 PROCEDURE — G8432 DEP SCR NOT DOC, RNG: HCPCS | Performed by: SPECIALIST

## 2022-11-18 PROCEDURE — G8427 DOCREV CUR MEDS BY ELIG CLIN: HCPCS | Performed by: SPECIALIST

## 2022-11-18 PROCEDURE — G0463 HOSPITAL OUTPT CLINIC VISIT: HCPCS | Performed by: SPECIALIST

## 2022-11-18 PROCEDURE — 1101F PT FALLS ASSESS-DOCD LE1/YR: CPT | Performed by: SPECIALIST

## 2022-11-18 PROCEDURE — G8536 NO DOC ELDER MAL SCRN: HCPCS | Performed by: SPECIALIST

## 2022-11-18 PROCEDURE — 1123F ACP DISCUSS/DSCN MKR DOCD: CPT | Performed by: SPECIALIST

## 2022-11-18 PROCEDURE — 93005 ELECTROCARDIOGRAM TRACING: CPT | Performed by: SPECIALIST

## 2022-11-18 PROCEDURE — 3078F DIAST BP <80 MM HG: CPT | Performed by: SPECIALIST

## 2022-11-18 PROCEDURE — 3074F SYST BP LT 130 MM HG: CPT | Performed by: SPECIALIST

## 2022-11-18 PROCEDURE — G8752 SYS BP LESS 140: HCPCS | Performed by: SPECIALIST

## 2022-11-18 PROCEDURE — 99214 OFFICE O/P EST MOD 30 MIN: CPT | Performed by: SPECIALIST

## 2022-11-18 PROCEDURE — 3017F COLORECTAL CA SCREEN DOC REV: CPT | Performed by: SPECIALIST

## 2022-11-18 PROCEDURE — 93010 ELECTROCARDIOGRAM REPORT: CPT | Performed by: SPECIALIST

## 2022-11-18 RX ORDER — METRONIDAZOLE 7.5 MG/G
GEL TOPICAL 2 TIMES DAILY
COMMUNITY

## 2022-11-18 NOTE — PATIENT INSTRUCTIONS

## 2022-11-18 NOTE — PROGRESS NOTES
Elizabeth Menezes is a 71 y.o. male    Visit Vitals  /60 (BP 1 Location: Left upper arm, BP Patient Position: Sitting, BP Cuff Size: Adult)   Pulse (!) 57   Ht 5' 8\" (1.727 m)   Wt 214 lb (97.1 kg)   SpO2 98%   BMI 32.54 kg/m²       Chief Complaint   Patient presents with    Palpitations    Leg Swelling    Hypertension       Chest pain NO  SOB NO  Dizziness NO  Swelling NO  Recent hospital visit NO  Refills NO  COVID VACCINE STATUS YES  HAD COVID?  YES

## 2022-11-18 NOTE — LETTER
11/18/2022    Patient: Mandi Leon   YOB: 1950   Date of Visit: 11/18/2022     Kaleb Phillips MD  [address] [place] [id number]  [address] [facility]    Dear Kaleb Phillips MD,      Thank you for referring Mr. Mandi Leon to [address] [place] [address] for evaluation. My notes for this consultation are attached. If you have questions, please do not hesitate to call me. I look forward to following your patient along with you.       Sincerely,    Sarah Simpson MD

## 2022-11-18 NOTE — PROGRESS NOTES
CARDIOLOGY OFFICE NOTE    Vicente Weber Mc, MD, [address], [place], [address] [facility]  Phone [phone]; Fax [phone]  Mobile [phone]   Voice Mail [phone]    LAST OFFICE VISIT : Visit date not found  Luzma Kelly MD       ATTENTION:   This medical record was transcribed using an electronic medical records/speech recognition system. Although proofread, it may and can contain electronic, spelling and other errors. Corrections may be executed at a later time. Please feel free to contact us for any clarifications as needed. ICD-10-CM ICD-9-CM   1. Essential hypertension  I10 401.9   2. Palpitations  R00.2 785.1   3. Bilateral leg edema  R60.0 782. 3            Jefferson Yuan is a 71 y.o. male with  referred for palpitations and lower extremity edema   . Cardiac risk factors: dyslipidemia, HTN, male gender  I have personally obtained the history from the patient. HISTORY OF PRESENTING ILLNESS    Jefferson Yuan is a 71 y.o. male. He does have a history of dyslipidemia, LEO, and hypertension. On last visit his blood pressure was slightly elevated  He is currently taking benazepril and HCTZ and I believe amlodipine was added and he notes his blood pressure significantly better.          ACTIVE PROBLEM LIST     Patient Active Problem List    Diagnosis Date Noted    Raynaud's phenomenon without gangrene 05/17/2022    Paresthesia 02/10/2022    Cervical radiculopathy 10/08/2021    Lumbar radiculopathy 10/08/2021    LEO (obstructive sleep apnea) 07/20/2021    TIA (transient ischemic attack)     Cervical spondylosis with radiculopathy 03/21/2021    DDD (degenerative disc disease), cervical 03/21/2021    Other spondylosis with radiculopathy, lumbar region 03/21/2021    Benign prostatic hyperplasia 01/15/2021    Essential hypertension 01/15/2021    Hypogonadism in male 01/15/2021    Other specified hypothyroidism 01/15/2021    Mixed hyperlipidemia 01/15/2021    Prediabetes 01/15/2021    Rosacea 01/15/2021    Severe obesity ([address] 09/10/2019           PAST MEDICAL HISTORY     Past Medical History:   Diagnosis Date    Benign prostatic hyperplasia     Blood glucose abnormal     Essential hypertension     Hypogonadism in male     Mixed hyperlipidemia     Other specified hypothyroidism     Palpitations     Prediabetes     Rosacea     TIA (transient ischemic attack)     Per patient. Negative imgaing.            PAST SURGICAL HISTORY     Past Surgical History:   Procedure Laterality Date    HX CATARACT REMOVAL  08/2021    HX CIRCUMCISION  2001    HX COLONOSCOPY  07/13/2018    2 adenomatous polyps removed -Dr. Ayah Guzman COLONOSCOPY  01/01/2012    HX CYST REMOVAL  1963    pilonidal cyst surgery    HX HERNIA REPAIR      HX KNEE REPLACEMENT Bilateral     HX ORTHOPAEDIC      Neuroma on foot removed     HX VASECTOMY      HX VEIN STRIPPING  12/2008    Dr. Julián Mejía   Allergen Reactions    Imiquimod Rash    Zocor [Simvastatin] Myalgia          FAMILY HISTORY     Family History   Problem Relation Age of Onset    Heart Disease Mother         had complications after heart valve surgery     Bleeding Prob Half-brother         Brain Tumor     Bleeding Prob Half-sister         Bone Cancer     Heart Attack Half-brother         2003    Heart Attack Half-brother         1980's    negative for cardiac disease       SOCIAL HISTORY     Social History     Socioeconomic History    Marital status:    Tobacco Use    Smoking status: Never    Smokeless tobacco: Never   Vaping Use    Vaping Use: Never used   Substance and Sexual Activity    Alcohol use: Never    Drug use: Never     Social Determinants of Health     Financial Resource Strain: Low Risk     Difficulty of Paying Living Expenses: Not hard at all   Food Insecurity: No Food Insecurity    Worried About Running Out of Food in the Last Year: Never true    [address] in the Last Year: Never true         MEDICATIONS     Current Outpatient Medications   Medication Sig    metroNIDAZOLE (METROGEL) 0.75 % topical gel Apply  to affected area two (2) times a day. amLODIPine (NORVASC) 10 mg tablet Take 1 Tablet by mouth daily. Synthroid 100 mcg tablet TAKE 1 TABLET EVERY MORNING    atorvastatin (LIPITOR) 20 mg tablet TAKE 1 TABLET DAILY    clopidogreL (PLAVIX) 75 mg tab TAKE 1 TABLET ONCE DAILY    cholecalciferol (VITAMIN D3) (1000 Units /25 mcg) tablet TAKE 2 TABLETS DAILY    tadalafiL (CIALIS) 5 mg tablet Take 5 mg by mouth daily. benazepril-hydroCHLOROthiazide (LOTENSIN HCT) 10-12.5 mg per tablet TAKE 1 TABLET DAILY FOR    BLOOD PRESSURE    testosterone enanthate (Xyosted) 75 mg/0.5 mL atIn 0.5 mL by SubCUTAneous route Every Saturday. latanoprost (XALATAN) 0.005 % ophthalmic solution Administer 1 Drop to both eyes nightly. No current facility-administered medications for this visit. I have reviewed the nurses notes, vitals, problem list, allergy list, medical history, family, social history and medications. REVIEW OF SYMPTOMS   As per HPI  General: Pt denies excessive weight gain or loss. Pt is able to conduct ADL's  HEENT: Denies blurred vision, headaches, hearing loss, epistaxis and difficulty swallowing. Respiratory: Denies cough, congestion, shortness of breath, SEGOVIA, wheezing or stridor. Cardiovascular: Denies precordial pain, palpitations, edema or PND  Gastrointestinal: Denies poor appetite, indigestion, abdominal pain or blood in stool  Genitourinary: Denies hematuria, dysuria, increased urinary frequency  Musculoskeletal: Denies joint pain or swelling from muscles or joints  Neurologic: Denies tremor, paresthesias, headache, or sensory motor disturbance  Psychiatric: Denies confusion, insomnia, depression  Integumentray: Denies rash, itching or ulcers.   Hematologic: Denies easy bruising, bleeding     PHYSICAL EXAMINATION      Vitals:    11/18/22 1411   BP: 136/60 Pulse: (!) 57   SpO2: 98%   Weight: 214 lb (97.1 kg)   Height: 5' 8\" (1.727 m)       General: Well developed, in no acute distress. HEENT: No jaundice, oral mucosa moist, no oral ulcers  Neck: Supple, no stiffness, no lymphadenopathy, supple  Heart:  Normal S1/S2 negative S3 or S4. Regular, no murmur, gallop or rub, no jugular venous distention  Respiratory: Clear bilaterally x 4, no wheezing or rales  Extremities: Wearing compression hose, normal cap refill, no cyanosis. Musculoskeletal: No clubbing, no deformities  Neuro: A&Ox3, speech clear, gait stable, cooperative, no focal neurologic deficits  Skin: Skin color is normal. No rashes or lesions. Non diaphoretic, moist.             DIAGNOSTIC DATA     1. Echo   10/21/19-EF 60%, LAE, AV sclerosis, Mild AI/MR/TR/PI, Pulmonary arterial systolic pressure is 15.5 mmHg. 7/20/21-EF 60 - 65%, LAE    2. Stress Test   10/21/19-Cardiolite- normal, mets 10.1     3. Lipids   9/14/19- TC 97, HDL 31, LDL 43,    1/23/20- , HDL 40, LDL 53,    7/20/21- , HDL 40, LDL 55.2,   9/2/22- , HDL 38, LDL 67,          4. LE Duplex   11/13/19-No evidence of deep vein thrombosis or venous obstruction within the lower extremities bilaterally     5.  Loop   9/29-10/28/19- SR with PVC's, average HR 64, min 43, max 143         LABORATORY DATA            Lab Results   Component Value Date/Time    WBC 6.6 03/05/2022 10:07 AM    HGB 16.1 03/05/2022 10:07 AM    HCT 48.4 03/05/2022 10:07 AM    PLATELET 030 32/67/5806 10:07 AM    MCV 96 03/05/2022 10:07 AM      Lab Results   Component Value Date/Time    Sodium 139 03/05/2022 10:07 AM    Potassium 4.9 03/05/2022 10:07 AM    Chloride 99 03/05/2022 10:07 AM    CO2 23 03/05/2022 10:07 AM    Anion gap 4 (L) 07/20/2021 03:23 AM    Glucose 97 03/05/2022 10:07 AM    BUN 20 03/05/2022 10:07 AM    Creatinine 1.25 03/05/2022 10:07 AM    BUN/Creatinine ratio 16 03/05/2022 10:07 AM    GFR est AA >60 07/20/2021 03:23 AM GFR est non-AA >60 07/20/2021 03:23 AM    Calcium 9.0 03/05/2022 10:07 AM    Bilirubin, total 0.7 09/02/2022 09:10 AM    Alk. phosphatase 74 09/02/2022 09:10 AM    Protein, total 6.5 09/02/2022 09:10 AM    Albumin 4.2 09/02/2022 09:10 AM    Globulin 2.8 07/20/2021 03:23 AM    A-G Ratio 1.8 03/05/2022 10:07 AM    ALT (SGPT) 16 09/02/2022 09:10 AM           PLAN        1. HTN  -Blood pressure today is 136/60 is excellent on his current medical regimen I will amlodipine 10 milligrams -day as well as benazepril and hydrochlorothiazide 10/12.5  -Continue low-sodium diet    2. Dyslipidemia  -Last LDL was 67 in September   -continue Diet low in red meat     3. LEO on CPAP  -He is wearing his CPAP on a regular basis     4. Possible TIA  -On Plavix    Follow-up with me in 6 months or PRN    We discussed the expected course, resolution and complications of the diagnosis(es) in detail. Medication risks, benefits, costs, interactions, and alternatives were discussed as indicated. I advised him to contact the office if his condition worsens, changes or fails to improve as anticipated. He expressed understanding with the diagnosis(es) and plan    Orders Placed This Encounter    AMB POC EKG ROUTINE W/ 12 LEADS, INTER & REP     Order Specific Question:   Reason for Exam:     Answer:   HTN    metroNIDAZOLE (METROGEL) 0.75 % topical gel     Sig: Apply  to affected area two (2) times a day. We discussed the expected course, resolution and complications of the diagnosis(es) in detail. Medication risks, benefits, costs, interactions, and alternatives were discussed as indicated. I advised him to contact the office if his condition worsens, changes or fails to improve as anticipated. He expressed understanding with the diagnosis(es) and plan        Follow-up and Dispositions    Return in about 6 months (around 5/18/2023).            I have discussed the diagnosis with  Kalani Norton and the intended plan as seen in the above orders. Questions were answered concerning future plans. I have discussed medication side effects and warnings with the patient as well. Thank you,  Manuel Morgan MD for involving me in the care of  C[place]. Please do not hesitate to contact me for further questions/concerns. Vicente Khalil MD, [address] [place] [address] Rd[address]      [facility], [address] [place] [address] [facility]      [phone] / [phone] Fax

## 2022-12-13 ENCOUNTER — TELEPHONE (OUTPATIENT)
Dept: FAMILY MEDICINE CLINIC | Age: 72
End: 2022-12-13

## 2022-12-13 NOTE — TELEPHONE ENCOUNTER
Patient would like for us to verify prescription refills with him. He has been receiving phone calls that make him believe his medication information is being hacked.

## 2022-12-14 NOTE — TELEPHONE ENCOUNTER
Spoke with patient - states insurance mixed up his rx information.  He like to be notified if they request any further refills

## 2022-12-23 ENCOUNTER — OFFICE VISIT (OUTPATIENT)
Dept: FAMILY MEDICINE CLINIC | Age: 72
End: 2022-12-23
Payer: MEDICARE

## 2022-12-23 VITALS
HEART RATE: 76 BPM | SYSTOLIC BLOOD PRESSURE: 120 MMHG | WEIGHT: 213 LBS | OXYGEN SATURATION: 97 % | TEMPERATURE: 97.3 F | BODY MASS INDEX: 32.28 KG/M2 | HEIGHT: 68 IN | DIASTOLIC BLOOD PRESSURE: 62 MMHG

## 2022-12-23 DIAGNOSIS — L02.91 ABSCESS: ICD-10-CM

## 2022-12-23 DIAGNOSIS — E55.9 VITAMIN D DEFICIENCY: ICD-10-CM

## 2022-12-23 DIAGNOSIS — E78.2 MIXED HYPERLIPIDEMIA: ICD-10-CM

## 2022-12-23 DIAGNOSIS — I10 ESSENTIAL HYPERTENSION: ICD-10-CM

## 2022-12-23 DIAGNOSIS — Z87.19 HISTORY OF RIGHT INGUINAL HERNIA: ICD-10-CM

## 2022-12-23 DIAGNOSIS — E03.9 ACQUIRED HYPOTHYROIDISM: ICD-10-CM

## 2022-12-23 DIAGNOSIS — M21.611 BUNION OF RIGHT FOOT: ICD-10-CM

## 2022-12-23 DIAGNOSIS — Z00.00 MEDICARE ANNUAL WELLNESS VISIT, SUBSEQUENT: Primary | ICD-10-CM

## 2022-12-23 PROCEDURE — G8417 CALC BMI ABV UP PARAM F/U: HCPCS | Performed by: STUDENT IN AN ORGANIZED HEALTH CARE EDUCATION/TRAINING PROGRAM

## 2022-12-23 PROCEDURE — G8510 SCR DEP NEG, NO PLAN REQD: HCPCS | Performed by: STUDENT IN AN ORGANIZED HEALTH CARE EDUCATION/TRAINING PROGRAM

## 2022-12-23 PROCEDURE — 1101F PT FALLS ASSESS-DOCD LE1/YR: CPT | Performed by: STUDENT IN AN ORGANIZED HEALTH CARE EDUCATION/TRAINING PROGRAM

## 2022-12-23 PROCEDURE — 3078F DIAST BP <80 MM HG: CPT | Performed by: STUDENT IN AN ORGANIZED HEALTH CARE EDUCATION/TRAINING PROGRAM

## 2022-12-23 PROCEDURE — 99214 OFFICE O/P EST MOD 30 MIN: CPT | Performed by: STUDENT IN AN ORGANIZED HEALTH CARE EDUCATION/TRAINING PROGRAM

## 2022-12-23 PROCEDURE — G8427 DOCREV CUR MEDS BY ELIG CLIN: HCPCS | Performed by: STUDENT IN AN ORGANIZED HEALTH CARE EDUCATION/TRAINING PROGRAM

## 2022-12-23 PROCEDURE — G0439 PPPS, SUBSEQ VISIT: HCPCS | Performed by: STUDENT IN AN ORGANIZED HEALTH CARE EDUCATION/TRAINING PROGRAM

## 2022-12-23 PROCEDURE — 3074F SYST BP LT 130 MM HG: CPT | Performed by: STUDENT IN AN ORGANIZED HEALTH CARE EDUCATION/TRAINING PROGRAM

## 2022-12-23 PROCEDURE — 1123F ACP DISCUSS/DSCN MKR DOCD: CPT | Performed by: STUDENT IN AN ORGANIZED HEALTH CARE EDUCATION/TRAINING PROGRAM

## 2022-12-23 PROCEDURE — 3017F COLORECTAL CA SCREEN DOC REV: CPT | Performed by: STUDENT IN AN ORGANIZED HEALTH CARE EDUCATION/TRAINING PROGRAM

## 2022-12-23 PROCEDURE — G8536 NO DOC ELDER MAL SCRN: HCPCS | Performed by: STUDENT IN AN ORGANIZED HEALTH CARE EDUCATION/TRAINING PROGRAM

## 2022-12-23 RX ORDER — TAMSULOSIN HYDROCHLORIDE 0.4 MG/1
0.4 CAPSULE ORAL DAILY
COMMUNITY

## 2022-12-23 RX ORDER — TESTOSTERONE CYPIONATE 200 MG/ML
INJECTION INTRAMUSCULAR
COMMUNITY
Start: 2022-11-28

## 2022-12-23 NOTE — PROGRESS NOTES
Chief Complaint   Patient presents with    Annual Wellness Visit     Medicare Wellness     1. \"Have you been to the ER, urgent care clinic since your last visit? Hospitalized since your last visit? \" No    2. \"Have you seen or consulted any other health care providers outside of the [address] [place] [address] since your last visit? \" Yes Dr. Israel Hannah (urology)      3. For patients aged 39-70: Has the patient had a colonoscopy / FIT/ Cologuard? Yes - no Care Gap present      If the patient is female:    4. For patients aged 41-77: Has the patient had a mammogram within the past 2 years? NA - based on age or sex      11. For patients aged 21-65: Has the patient had a pap smear? NA - based on age or sex    3 most recent PHQ Screens 12/23/2022   Little interest or pleasure in doing things Not at all   Feeling down, depressed, irritable, or hopeless Not at all   Total Score PHQ 2 0     Fall Risk Assessment, last 12 mths 12/23/2022   Able to walk? Yes   Fall in past 12 months? 0   Do you feel unsteady?  0   Are you worried about falling 0

## 2022-12-23 NOTE — PROGRESS NOTES
Medicare Wellness Exam:    Chief Complaint   Patient presents with    Annual Wellness Visit     Medicare Wellness     he is a 71y.o. year old male who presents for evaluation for their Medicare Wellness Visit  Complaints. Patient has an area in the medial right thigh where he gets recurrent abscesses. He is worried it may be a cyst.  Most recently was given antibiotics, and drained a little better on its own and finally resolved. Of note he does have a history of pilonidal cyst.    Patient has history of right inguinal hernia. Had surgery to correct it. Currently feels discomfort in the area which radiates to his right lower back which is similar to previous hernia symptoms. No obvious inguinal hernia was noted today on physical exam either a direct inguinal hernia or indirect inguinal hernia. Patient has discomfort of his right foot pain near his right big toe. On physical exam he has a bunion. He has history of neuroma surgery on the same foot. Needs medications refilled and sent to new pharmacy. Fall Screen is completed and assessed=yes. Depression Screen is completed and assessed=yes  Medication list reviewed and adjusted for accuracy=yes  Immunizations reviewed and updated=yes  Health/Preventative Screenings reviewed and updated=yes  ADL Functions reviewed=yes  MiniCog Score= 5/5 (2 points for clock and 3/3 points for recall)   See scanned medicare wellness documents for full details. Patient Active Problem List    Diagnosis    Raynaud's phenomenon without gangrene    Paresthesia     Hands and feet. Follows with neurology. Possibly due to Raynaud's. EMG negative for neuropathy or myopathy.  Cervical radiculopathy    Lumbar radiculopathy    LEO (obstructive sleep apnea)    TIA (transient ischemic attack)     Per patient. Negative imgaing.  Cervical spondylosis with radiculopathy     Mild spondylosis. Seen on EMG, and XR. Following with neurology.       DDD (degenerative disc disease), cervical     Seen on XR.  Other spondylosis with radiculopathy, lumbar region     Mild spondylosis. Seen on EMG, and XR. Following with neurology.  Benign prostatic hyperplasia    Essential hypertension    Hypogonadism in male    Other specified hypothyroidism    Mixed hyperlipidemia    Prediabetes    Rosacea    Severe obesity (HCC)       Reviewed PmHx, RxHx, FmHx, SocHx, AllgHx and updated and dated in the chart. Review of Systems   All other systems reviewed and are negative. Objective:     Vitals:    12/23/22 0838   BP: 120/62   Pulse: 76   Temp: 97.3 °F (36.3 °C)   TempSrc: Temporal   SpO2: 97%   Weight: 213 lb (96.6 kg)   Height: 5' 8\" (1.727 m)     Physical Exam  Vitals reviewed. HENT:      Head: Normocephalic and atraumatic. Cardiovascular:      Rate and Rhythm: Normal rate and regular rhythm. Heart sounds: Normal heart sounds. Pulmonary:      Effort: Pulmonary effort is normal.      Breath sounds: Normal breath sounds. Musculoskeletal:      Comments: Bunion noted at right foot. Skin:     Comments: Area of his previous healed abscess noted in the right medial thigh. No cyst palpated in the area. Neurological:      Mental Status: He is oriented to person, place, and time. Psychiatric:         Behavior: Behavior normal.        Assessment/ Plan:   Diagnoses and all orders for this visit:    1. Medicare annual wellness visit, subsequent    2. Bunion of right foot  -     REFERRAL TO PODIATRY    3. History of right inguinal hernia    4. Abscess    5. Essential hypertension    6. Mixed hyperlipidemia    7. Vitamin D deficiency  Bunion of right foot-podiatry consulted    History of inguinal hernia-I did not feel a hernia today, but due to similarity of symptoms to see general surgery. He will make appoint with his general surgeon. Cause unknown we will do a CT of the abdomen and pelvis without unlikely kidney stone.     Chronic conditions-continue current management follow-up labs. Medicare wellness-update HCM and follow-up labs. Will need shingles updated received both doses. Recurrent abscess-currently just has resolved. No sign of cyst.    -Pain evaluation performed in office  -Cognitive Screen performed in office  -Depression Screen, Fall risks  and ADL functionality were addressed  -Medication list updated and reviewed for any changes   -A comprehensive review of medical issues and a plan was formulated  -End of life planning was addressed with pt   -Health Screenings for preventions were addressed and a plan was formulated  -Shingles Vaccine was recommended  -Discussed with patient cancer risk factors and appropriate screenings for age  -Patient evaluated for colonoscopy and referred if needed per screeing criteria  -Labs from previous visits were discussed with patient   -Discussed with patient diet and exercise and formulated a plan as needed  -Advanced care planning was addressed with the patient.  -Alcohol screening performed and was negative    -  Follow-up and Dispositions    Return in about 6 months (around 6/23/2023) for Chronic Conditions. I have discussed the diagnosis with the patient and the intended plan as seen in the above orders. The patient understands and agrees with the plan. The patient has received an after-visit summary and questions were answered concerning future plans. Medication Side Effects and Warnings were discussed with patien  Patient Labs were reviewed and or requested  Patient Past Records were reviewed and or requested    There are no Patient Instructions on file for this visit.       Ignacio Blanchard MD

## 2022-12-30 ENCOUNTER — PATIENT MESSAGE (OUTPATIENT)
Dept: FAMILY MEDICINE CLINIC | Age: 72
End: 2022-12-30

## 2022-12-30 DIAGNOSIS — I10 ESSENTIAL HYPERTENSION: ICD-10-CM

## 2022-12-30 DIAGNOSIS — E78.2 MIXED HYPERLIPIDEMIA: ICD-10-CM

## 2022-12-30 DIAGNOSIS — E55.9 VITAMIN D DEFICIENCY: ICD-10-CM

## 2022-12-30 NOTE — TELEPHONE ENCOUNTER
From: Don Duff  To: Reza Montoya MD  Sent: 12/30/2022 1:42 PM EST  Subject: New Prescriptions since my old mail order would not forward teem to [facility] (my new pharmacy)    Dr. Doyle Martinez,  I saw you last Friday 12-23-22, for a wellness appointment when we also discussed my prescriptions. I am now using [facility] at [address] [place] [address],[address], [place], [place]. My previous pharmacy was mail order and they would not transfer anything to [facility] since they said I did not have refills remaining. [facility] said I would have to get you to write new prescriptions in order to make that transition to them. My last refills were in September and they had all been on automatic refill so I will need a new prescription for all of them to [facility]. PLEASE DO A 90 DAY PRESCRIPTION FOR ALL OF THEM. They are:   Atorvastatin 20 mg 1 per day  Clopidogrel 75 mg 1 per day  Benazepril HVL/Hrdrochlo 10-12.5 1 per day  Synthroid . 1 mg 1 per day  Metronidazole 0.75% Topical gel Apply twice daily  Vitamin D3 25 mcg 2 tab daily  Amlodipine Besylate 10 mg 1 per day.     Thank Osiris Hightower

## 2023-01-01 RX ORDER — BENAZEPRIL HYDROCHLORIDE AND HYDROCHLOROTHIAZIDE 10; 12.5 MG/1; MG/1
TABLET ORAL
Qty: 90 TABLET | Refills: 3 | Status: SHIPPED | OUTPATIENT
Start: 2023-01-01

## 2023-01-01 RX ORDER — METRONIDAZOLE 7.5 MG/G
GEL TOPICAL 2 TIMES DAILY
Qty: 60 G | Refills: 3 | Status: SHIPPED | OUTPATIENT
Start: 2023-01-01

## 2023-01-01 RX ORDER — LEVOTHYROXINE SODIUM 100 UG/1
100 TABLET ORAL DAILY
Qty: 90 TABLET | Refills: 3 | Status: SHIPPED | OUTPATIENT
Start: 2023-01-01

## 2023-01-01 RX ORDER — AMLODIPINE BESYLATE 10 MG/1
10 TABLET ORAL DAILY
Qty: 90 TABLET | Refills: 3 | Status: SHIPPED | OUTPATIENT
Start: 2023-01-01

## 2023-01-01 RX ORDER — MELATONIN
Qty: 180 TABLET | Refills: 3 | Status: SHIPPED | OUTPATIENT
Start: 2023-01-01

## 2023-01-01 RX ORDER — ATORVASTATIN CALCIUM 20 MG/1
TABLET, FILM COATED ORAL
Qty: 90 TABLET | Refills: 3 | Status: SHIPPED | OUTPATIENT
Start: 2023-01-01

## 2023-01-01 RX ORDER — CLOPIDOGREL BISULFATE 75 MG/1
75 TABLET ORAL DAILY
Qty: 90 TABLET | Refills: 3 | Status: SHIPPED | OUTPATIENT
Start: 2023-01-01

## 2023-01-10 ENCOUNTER — PATIENT MESSAGE (OUTPATIENT)
Dept: FAMILY MEDICINE CLINIC | Age: 73
End: 2023-01-10

## 2023-01-10 DIAGNOSIS — M21.611 BUNION OF RIGHT FOOT: Primary | ICD-10-CM

## 2023-01-11 NOTE — TELEPHONE ENCOUNTER
[facility], [place] 1/10/2023 12:33 PM EST      ----- Message -----  From: Lyn Rankin  Sent: 1/10/2023 12:13 PM EST  To: [facility] Nurse  Subject: Podiatrist     Dr. Anastasiya Prescott,  When I last saw at the end of December you did a referral to Dr. Jordyn Vicente in [place]. I live in [place], near [address] [place] [address]. Are you able to recommend a Podiatrist in that vicinity instead of [place]?

## 2023-02-20 ENCOUNTER — HOSPITAL ENCOUNTER (OUTPATIENT)
Dept: PHYSICAL THERAPY | Age: 73
Discharge: HOME | End: 2023-02-20
Payer: MEDICARE

## 2023-02-20 PROCEDURE — 97161 PT EVAL LOW COMPLEX 20 MIN: CPT

## 2023-02-20 PROCEDURE — 97530 THERAPEUTIC ACTIVITIES: CPT

## 2023-02-20 NOTE — THERAPY EVALUATION
[address]  [facility]  [address] [facility]  [address] [address] [address]  [address]      INITIAL EVALUATION    NAME: Ramsey Garcia AGE: 71 y.o. GENDER: male  DATE: 02/20/23  REFERRING PHYSICIAN: Stu Ash MD (cardiology)  HISTORY AND BACKGROUND:  Pt is a 72 yo male returning today for 6 month re-evaluation of bilateral LE secondary lymphedema. Pt continues to wear Juzo 3512 knee high stockings every day from morning until evening. Pt says he is able to don/doff stockings independently using medi donning cerna and dycem. Pt says he went 2 weeks without wearing stockings due to gout flare-up and reports slightly increased swelling that has since improved. No other changes to medical history since last visit. From prior evaluation: Pt reports 20+ year history of fluctuating LE edema, mostly in the foot and ankle, exacerbated by varicose veins in the last 3 years. Pt with history of B TKA and neuroma on right foot. Previous doppler negative for DVT. Primary Diagnosis:  BLE lymphedema, secondary (I89.0)  Other Treatment Diagnoses:  Swelling not relieved by elevation (R60.0 localized edema)  Venous insufficiency (I87.2)   Date of Onset: reports a 20+ year history of fluctuating LE edema, mostly in the foot and ankle. In the past few years exacerbated by varicose issues  Present Symptoms and Functional Limitations: LE swelling has fluctuations and difficulty with wearing shoes. Reports improvement since initiating stocking wear in July 2020.    Lower Extremity Functional Scale: 21/68, 31% impairment     Past Medical History:   Past Medical History:   Diagnosis Date    Benign prostatic hyperplasia     Blood glucose abnormal     Essential hypertension     Hypogonadism in male     Mixed hyperlipidemia     Other specified hypothyroidism     Palpitations     Prediabetes     Rosacea     TIA (transient ischemic attack)     Per patient. Negative imgaing. Past Surgical History:   Procedure Laterality Date    HX CATARACT REMOVAL  08/2021    HX CIRCUMCISION  2001    HX COLONOSCOPY  07/13/2018    2 adenomatous polyps removed -Dr. Baeza Led COLONOSCOPY  01/01/2012    HX CYST REMOVAL  1963    pilonidal cyst surgery    HX HERNIA REPAIR      HX KNEE REPLACEMENT Bilateral     HX ORTHOPAEDIC      Neuroma on foot removed     HX UROLOGICAL  2020    Urolift (continued having frequency, and incontinence after sx)    HX VASECTOMY      HX VEIN STRIPPING  12/2008    Dr. Rajwinder Katz     Current Medications:    Current Outpatient Medications   Medication Sig    atorvastatin (LIPITOR) 20 mg tablet TAKE 1 TABLET DAILY    clopidogreL (PLAVIX) 75 mg tab Take 1 Tablet by mouth daily. benazepril-hydroCHLOROthiazide (LOTENSIN HCT) 10-12.5 mg per tablet TAKE 1 TABLET DAILY FOR    BLOOD PRESSURE    levothyroxine (Synthroid) 100 mcg tablet Take 1 Tablet by mouth daily. metroNIDAZOLE (METROGEL) 0.75 % topical gel Apply  to affected area two (2) times a day. cholecalciferol (VITAMIN D3) (1000 Units /25 mcg) tablet TAKE 2 TABLETS DAILY    amLODIPine (NORVASC) 10 mg tablet Take 1 Tablet by mouth daily. testosterone cypionate (DEPOTESTOTERONE CYPIONATE) 200 mg/mL injection INJECT 1 ML INTRAMUSCULARLY EVERY 3 WEEKS    tamsulosin (FLOMAX) 0.4 mg capsule Take 0.4 mg by mouth daily. tadalafiL (CIALIS) 5 mg tablet Take 5 mg by mouth daily. latanoprost (XALATAN) 0.005 % ophthalmic solution Administer 1 Drop to both eyes nightly. No current facility-administered medications for this encounter. Allergies: Allergies   Allergen Reactions    Imiquimod Rash    Zocor [Simvastatin] Myalgia      Prior Level of Function/Social/Work History/Personal factors and/or comorbidities impacting plan of care: pt recently retired from [employer]; pt helps to take care of school age grandchildren after school.     Living Situation: home      Trainable Caregiver?: wife   Self-care/ADLs: independent     Mobility: independent   Sleeping Arrangement:  In a bed   Adaptive Equipment Owned: none   Other:   Previous Therapy:  Seen at Lymphedema clinic in July 2020, re-evals every 6 months. Compression/Lymphedema Equipment:  VoluBill 3512 knee high stockings, size III regular, closed toe (2 pair about 6 months old). SUBJECTIVE:   Patient reports good tolerance to knee high stockings and feels swelling is stable. Patients goals for therapy: \"keep swelling under control,\" recheck volumes and obtain new compression garments. OBJECTIVE DATA SUMMARY:   EXAMINATION/PRESENTATION/DECISION MAKING:   Pain:   4/10 left foot due to recent gout        Self-Care and ADLs:  Grooming: Independent  Bathing: Independent    UB Dressing: Independent  LB Dressing: Independent    Don/Doff Shoes/Socks:  Independent  Toileting: Independent    Other:       Skin and Tissue Assessment:  Dermal Status:  [x]   Intact []  Dry   []  Tenuous [] Flaky   []  Wound/lesion present [x]  Scars: B TKA   []  Dermatitis    Texture/Consistency:  []  Boggy []  Pitting Edema: 1+ pitting lower legs, malleolar region    []  Brawny []  Combination   []  Fibrotic/Woody    Pigmentation/Color Change:  []  Normal []  Hemosiderin   []  Red []  Erythematous   [x]  Hyperpigmented: around feet/ankles  []  Hyperlipodermatosclerosis   Anomalies:  []  Lymphorrhea []  Vesicles   []  Petechiae []  Warty Vercusis   []  Bullae []  Papilloma   Circulatory:  []  XENA [x]  Varicosities: varicous veins present   [x]  Pulses palpable  [x]  Vascular studies ruled out DVT in past   []  DVT History    Nails:  [x]   Normal  []   Fungus  Stemmers Sign: negative  Height:   5'9\"  Weight:   214 lbs  BMI:   31.6  (36 or greater: adversely affecting lymphedema)  Wound/Ulcer:  no      Wound Pain:   none  Volumetric Measurements:   Date:  Right Left % difference    2/20/23 3127.0 3081.06 -1.47   8/31/22 3311.17 3175.09 -4.11   2/24/22 3154.79 3112.82 -1.33   7/22/20 3532.05 3420.34 -3.16      Range of Motion: WFL  Strength: WFL  Sensation:  Intact  Mobility:  Bed/Chair Mobility:  Independent  Transfers:  Independent    Sitting Balance:  intact Standing Balance:  intact   Gait:  Independent  Wheelchair Mobility:  n/a   Endurance:  good Stairs:  Independent          Safety:  Patient is alert and oriented:  x4   Safety awareness:  yes   Fall Risk?:  no   Patient given written fall prevention handout: Yes   Precautions:  Standard lymphedema precautions to include avoiding blood pressure readings, injections and IVs or other procedures/acts that could lead to broken skin on affected area, and avoiding excessive heat, resistive activity or altitude without compression garment    Physical Therapy Evaluation Charge Determination   History Examination Presentation Decision-Making   MEDIUM  Complexity : 1-2 comorbidities / personal factors will impact the outcome/ POC  LOW Complexity : 1-2 Standardized tests and measures addressing body structure, function, activity limitation and / or participation in recreation  LOW Complexity : Stable, uncomplicated  Other outcome measures LLIS  LOW       Based on the above components, the patient evaluation is determined to be of the following complexity level: LOW     Evaluation Time: 10:00 - 10:20 am (20 minutes)    TREATMENT PROVIDED:   1. Treatment description:  Therapeutic activity x 2  The patient was re-educated regarding the role and function of the lymphatic system, and instructed in the lymphedema management protocol of complete decongestive therapy (CDT). This includes skin care to prevent breakdown or infection, lymphedema exercises, custom compression therapy options (bandaging, compression garments, compression pump, Lisa Lathe, JoViPak, The Kareem-Oswaldo, etc. as needed), and decongestion with manual lymphatic drainage as indicated.   We discussed the need for consistent compression system for lymphedema management. Diaphragmatic breathing instruction and skin care education was performed,applying low pH lotion to extremity using upward strokes to stimulate lymphatic vessels. Patient with combination edema complicated by vascular insufficiency. Reviewed limb volumes as above and garment measurements. Patient measured for replacement Juzo 3512 CCL2 knee high stockings, beige, open toe, bilateral LE, size III regular. DME order sent to vendor. Explained to pt that this may take several weeks to receive stockings due to need for signed eval/LMN from MD and then obtaining insurance authorization. Pt to fit garments on his own and contact clinic with any issues prior to next 6 month re-evaluation. Treatment time:  10:20 - 10:45 am  Minutes: 25    ASSESSMENT:   Jayjay Lo is a 71 y.o. male who presents with Stage II combination edema with venous insufficiency. Patient's limb volumes have reduced since transitioning to Juzo dynamic stockings in July 2020. Patient's leg discrepancy 1.5% and able to be measured for replacement RM garments, Juzo 3512 size III regular, open toe. DME order sent to vendor. Explained to pt that this may take several weeks to receive stockings due to need for signed eval/LMN from MD and then obtaining insurance authorization. Pt to fit garments on his own since no change in size/style indicated and return to clinic in 6 months for re-evaluation. Patient continues to benefit from aspects of complete decongestive therapy (CDT) including manual lymphatic drainage (MLD) technique, short-stretch compression system to maintain limb volumes, and kinesiotaping as appropriate. Patient will receive instruction in proper skin care to recognize signs/symptoms of and prevent infection, therapeutic exercise, and self-MLD for independent home program and restorative lymphatic performance.     This care is medically necessary due to the infection risk with lymphedema, and to improve functional activities. CDT is necessary to resolve swelling to allow patient to return to wearing normal clothes/footwear, and prevent worsening of symptoms, such as venous stasis ulcerations, infections, or hospitalizations. Patient will be independent with home program strategies to allow improved ADL ability and mobility and to allow patient to return to greatest functional independence. PLAN OF CARE:   Recommendations and Planned Interventions:  No f/u visits indicated. Pt to return in 6 months for re-evaluation or sooner as needed. Patient has participated in goal setting and agrees to work toward plan of care. Patient was instructed to call if questions or concerns arise. Thank you for this referral.  Andrew Randle. Sera, PT, DPT, CLT-OTTONIEL    Time Calculation: 45 mins    TREATMENT PLAN EFFECTIVE DATES:   2/20/2023 TO 5/15/2023  I have read the above plan of care for [address] [facility] DESTINEE WELLS. I certify the above prescribed services are required by this patient and are medically necessary.   The above plan of care has been developed in conjunction with the lymphedema/physical therapist.       Physician Signature: ____________________________________Date:______________

## 2023-02-20 NOTE — THERAPY EVALUATION
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 [x]   I87.2 Venous Insufficiency   [x]   I89.0 Lymphedema, other secondary  []   I83.019 Venous Stasis Ulcer LE, Right   []   I89.9 Unspecified Lymphatic Disorder []   I83.029 Venous Stasis Ulcer LE, Left   [x]   R60.9 Swelling not relieved by elevation []   Q82.0 Hereditary/ Congenital Lymphedema   []   C50.211 Malignant neoplasm of breast, Right []   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   []   C50.212 Malignant neoplasm of breast, Left []   L03.115 LE Cellulitis, Right   []    []   L03.116 LE Cellulitis, Left                                                           Jayjay Lo    1950  Page 2 of 2    Physician Order for DME for Diagnosis of Secondary Lymphedema as Listed on Statement of Medical Necessity, Page 1        Recommended Product:  Units Upper Extremity Rt Lt Units Lower Extremity Rt Lt    Circ-Aid, Ready Wrap, Sigvaris Arm    Inelastic binders (Circ-Aid, Farrow)  []Foot   []Below Knee   []Knee   []Thigh      Circ-Aid Ready Wrap, Sigvaris hand    Castillo Miguel A, night use []Full Leg  []Lower Leg      Tribute Arm, night use    Tribute, night use  []Full Leg  []Lower Leg      Castillo Miguel A Arm, night use    Olu Sleeve Leg/ Foot, night use      Gradiant Compression Sleeves & Gloves  []Custom [] RM Arm:  []CCL1 []CCL2 []CCL3  []Custom [] RM Glove: []CCL1 []CCL2 []CCL3     4 Gradient Compression Stockings   [x]Custom  [x]RM Lower Extremity:   [x]CCL1       [x]CCL2         []CCL3   [x]Knee       [x]Thigh        [x]Waist Length x x    Olu sleeve arm w/ hand, night use    Tribute Wrap, night use      Compression Bra          Compression Vest         The above patient was referred for treatment of Lymphedema due to the diagnosis indicated above.   Lymphedema is a progressive and chronic condition. It may cause acute infections, muscle atrophy, discomfort, and connective tissue fibrosis with irreversible tissue damage, decreased ROM in the affected limb and diminished functional mobility possibly interfering with independence and ability to work. Recurrent infections and wound complications that commonly occur with Lymphedema often require hospitalization and extensive wound care, thus increasing medical costs. The patient has received complete decongestive therapy which includes manual lymphatic drainage, lymphedema specific exercises, compression bandaging, and hygiene/skin care. Goals of therapy are to reduce the edema and prevent re-accumulation of fluid with its complications. It is medically necessary for this patient to have daytime garments to control this condition. They will need 2 sets (one set to wear and one set to wash for adequate skin care and wearing time). Garments must be replaced every 4-6 months for effectiveness. There are no substitutes available that offer acceptable compression treatment for this Lymphedema patient. If further information is requested, please contact our certified lymphedema therapists at [phone].     [x] Ranjith Márquez PT, DPT, CLT-OTTONIEL  [] Brandie Hernandez PT, [facility]  [] Uzma Hawley PT, DPT, CLT-OTTONIEL  [] Hayes Tovar, PT, DPT, CLT    Printed  Provider Name Sunday MD Olinda Provider Signature  Date    Provider NPI [id number]

## 2023-05-04 LAB
ALBUMIN SERPL-MCNC: 4.3 G/DL (ref 3.7–4.7)
ALP SERPL-CCNC: 67 IU/L (ref 44–121)
ALT SERPL-CCNC: 28 IU/L (ref 0–44)
AST SERPL-CCNC: 19 IU/L (ref 0–40)
BILIRUB DIRECT SERPL-MCNC: 0.11 MG/DL (ref 0–0.4)
BILIRUB SERPL-MCNC: 0.6 MG/DL (ref 0–1.2)
CHOLEST SERPL-MCNC: 133 MG/DL (ref 100–199)
HDLC SERPL-MCNC: 47 MG/DL
IMP & REVIEW OF LAB RESULTS: NORMAL
LDLC SERPL CALC-MCNC: 71 MG/DL (ref 0–99)
PROT SERPL-MCNC: 6.6 G/DL (ref 6–8.5)
TRIGL SERPL-MCNC: 72 MG/DL (ref 0–149)
VLDLC SERPL CALC-MCNC: 15 MG/DL (ref 5–40)

## 2023-05-22 ENCOUNTER — OFFICE VISIT (OUTPATIENT)
Age: 73
End: 2023-05-22
Payer: MEDICARE

## 2023-05-22 VITALS
WEIGHT: 211 LBS | DIASTOLIC BLOOD PRESSURE: 70 MMHG | OXYGEN SATURATION: 97 % | HEIGHT: 68 IN | SYSTOLIC BLOOD PRESSURE: 148 MMHG | BODY MASS INDEX: 31.98 KG/M2 | HEART RATE: 68 BPM

## 2023-05-22 DIAGNOSIS — I10 ESSENTIAL (PRIMARY) HYPERTENSION: Primary | ICD-10-CM

## 2023-05-22 DIAGNOSIS — G47.33 OBSTRUCTIVE SLEEP APNEA (ADULT) (PEDIATRIC): ICD-10-CM

## 2023-05-22 DIAGNOSIS — R00.2 PALPITATIONS: ICD-10-CM

## 2023-05-22 PROCEDURE — G8427 DOCREV CUR MEDS BY ELIG CLIN: HCPCS | Performed by: SPECIALIST

## 2023-05-22 PROCEDURE — 3074F SYST BP LT 130 MM HG: CPT | Performed by: SPECIALIST

## 2023-05-22 PROCEDURE — G8417 CALC BMI ABV UP PARAM F/U: HCPCS | Performed by: SPECIALIST

## 2023-05-22 PROCEDURE — 99214 OFFICE O/P EST MOD 30 MIN: CPT | Performed by: SPECIALIST

## 2023-05-22 PROCEDURE — 3017F COLORECTAL CA SCREEN DOC REV: CPT | Performed by: SPECIALIST

## 2023-05-22 PROCEDURE — 1123F ACP DISCUSS/DSCN MKR DOCD: CPT | Performed by: SPECIALIST

## 2023-05-22 PROCEDURE — 1036F TOBACCO NON-USER: CPT | Performed by: SPECIALIST

## 2023-05-22 PROCEDURE — 3078F DIAST BP <80 MM HG: CPT | Performed by: SPECIALIST

## 2023-05-22 RX ORDER — AMLODIPINE BESYLATE 5 MG/1
5 TABLET ORAL DAILY
Qty: 30 TABLET | Refills: 5 | Status: SHIPPED | OUTPATIENT
Start: 2023-05-22

## 2023-05-22 NOTE — PROGRESS NOTES
Lymphedema  clinic needs a new referral note. ICD 89.0. Felling Dizzy sometimes more then before for last 1 mo. Chief Complaint   Patient presents with    Follow-up     6 mo      Vitals:    23 1343 23 1350 23 1351   BP: (!) 110/58 128/60 (!) 148/70   Site: Left Upper Arm Right Upper Arm Right Upper Arm   Position: Sitting Sitting Standing   Pulse: 68     SpO2: 97%     Weight: 211 lb (95.7 kg)     Height: 5' 8\" (1.727 m)         NAME Elma Sanderson         1950      MRN    [id number]      LAST OFFICE APPOINTMENT: 2022     DIAGNOSIS    ICD-10-CM    1. Essential (primary) hypertension  I10       2. Palpitations  R00.2       3. Obstructive sleep apnea (adult) (pediatric)  G47.33           HOME MEDICATION  Current Outpatient Medications   Medication Sig    amLODIPine (NORVASC) 10 MG tablet Take 1 tablet by mouth daily    atorvastatin (LIPITOR) 20 MG tablet Take 1 tablet by mouth daily    benazepril-hydrochlorthiazide (LOTENSIN HCT) 10-12.5 MG per tablet TAKE 1 TABLET DAILY FOR    BLOOD PRESSURE    vitamin D (CHOLECALCIFEROL) 25 MCG (1000 UT) TABS tablet Take 2 tablets by mouth daily    clopidogrel (PLAVIX) 75 MG tablet Take 1 tablet by mouth daily    latanoprost (XALATAN) 0.005 % ophthalmic solution Apply 1 drop to eye    levothyroxine (SYNTHROID) 100 MCG tablet Take 1 tablet by mouth daily    metroNIDAZOLE (METROGEL) 0.75 % gel Apply topically 2 times daily    tadalafil (CIALIS) 5 MG tablet Take 1 tablet by mouth daily    tamsulosin (FLOMAX) 0.4 MG capsule Take 1 capsule by mouth daily    testosterone cypionate (DEPOTESTOTERONE CYPIONATE) 200 MG/ML injection INJECT 1 ML INTRAMUSCULARLY EVERY 3 WEEKS     No current facility-administered medications for this visit.        VITAL SIGNS  Wt Readings from Last 3 Encounters:   23 211 lb (95.7 kg)   22 213 lb (96.6 kg)   22 214 lb (97.1 kg)     BP Readings from Last 3 Encounters:   23 (!) 148/70   22 120/62

## 2023-05-22 NOTE — PROGRESS NOTES
CARDIOLOGY OFFICE NOTE    Alonso Farrell MD, [address], [place], [address] [facility]  Phone [phone]; Fax [phone]  Mobile [phone]   Voice Mail [phone]    Primary care: Jagdish Kirby MD       ATTENTION:   This medical record was transcribed using an electronic medical records/speech recognition system. Although proofread, it may and can contain electronic, spelling and other errors. Corrections may be executed at a later time. Please feel free to contact us for any clarifications as needed. Doug Bailey is a 71 y.o. male with  referred for  palpitations and lower extremity edema           Cardiac risk factors: dyslipidemia, HTN, male gender   I have personally obtained the history from the patient. HISTORY OF PRESENTING ILLNESS    MsTimothy/Mr. Doug Bailey  71 y.o. is is being seen for a  history of dyslipidemia, LISBTEH, and hypertension. Blood pressure readings at home were normal.  He does state in the last 6 months he had 3 episodes of feeling dizzy and unclear what that is related to. I told him I could be related to his Flomax his amlodipine or even the HCTZ this and as benazepril/hydrochlorothiazide. Encouraged him to stay hydrated. He has not described any chest pain or shortness of breath. He does want a referral to lymphedema clinic and he is wearing compression hose today.      ACTIVE PROBLEM LIST     Patient Active Problem List    Diagnosis Date Noted    Raynaud's phenomenon without gangrene 05/17/2022    Paresthesia 02/10/2022    Lumbar radiculopathy 10/08/2021    Cervical radiculopathy 10/08/2021    LISBETH (obstructive sleep apnea) 07/20/2021    TIA (transient ischemic attack)     DDD (degenerative disc disease), cervical 03/21/2021    Other spondylosis with radiculopathy, lumbar region 03/21/2021    Cervical spondylosis with radiculopathy 03/21/2021    Other specified hypothyroidism 01/15/2021    Essential hypertension 01/15/2021

## 2023-05-22 NOTE — PATIENT INSTRUCTIONS

## 2023-06-21 SDOH — ECONOMIC STABILITY: FOOD INSECURITY: WITHIN THE PAST 12 MONTHS, THE FOOD YOU BOUGHT JUST DIDN'T LAST AND YOU DIDN'T HAVE MONEY TO GET MORE.: NEVER TRUE

## 2023-06-21 SDOH — ECONOMIC STABILITY: HOUSING INSECURITY
IN THE LAST 12 MONTHS, WAS THERE A TIME WHEN YOU DID NOT HAVE A STEADY PLACE TO SLEEP OR SLEPT IN A SHELTER (INCLUDING NOW)?: NO

## 2023-06-21 SDOH — ECONOMIC STABILITY: FOOD INSECURITY: WITHIN THE PAST 12 MONTHS, YOU WORRIED THAT YOUR FOOD WOULD RUN OUT BEFORE YOU GOT MONEY TO BUY MORE.: NEVER TRUE

## 2023-06-21 SDOH — ECONOMIC STABILITY: TRANSPORTATION INSECURITY
IN THE PAST 12 MONTHS, HAS LACK OF TRANSPORTATION KEPT YOU FROM MEETINGS, WORK, OR FROM GETTING THINGS NEEDED FOR DAILY LIVING?: NO

## 2023-06-21 SDOH — ECONOMIC STABILITY: INCOME INSECURITY: HOW HARD IS IT FOR YOU TO PAY FOR THE VERY BASICS LIKE FOOD, HOUSING, MEDICAL CARE, AND HEATING?: NOT HARD AT ALL

## 2023-06-23 ENCOUNTER — TELEMEDICINE (OUTPATIENT)
Facility: CLINIC | Age: 73
End: 2023-06-23
Payer: MEDICARE

## 2023-06-23 DIAGNOSIS — M10.9 GOUT INVOLVING TOE OF RIGHT FOOT, UNSPECIFIED CAUSE, UNSPECIFIED CHRONICITY: ICD-10-CM

## 2023-06-23 DIAGNOSIS — E55.9 VITAMIN D DEFICIENCY, UNSPECIFIED: Primary | ICD-10-CM

## 2023-06-23 PROCEDURE — 99214 OFFICE O/P EST MOD 30 MIN: CPT | Performed by: STUDENT IN AN ORGANIZED HEALTH CARE EDUCATION/TRAINING PROGRAM

## 2023-06-23 PROCEDURE — G8417 CALC BMI ABV UP PARAM F/U: HCPCS | Performed by: STUDENT IN AN ORGANIZED HEALTH CARE EDUCATION/TRAINING PROGRAM

## 2023-06-23 PROCEDURE — 1123F ACP DISCUSS/DSCN MKR DOCD: CPT | Performed by: STUDENT IN AN ORGANIZED HEALTH CARE EDUCATION/TRAINING PROGRAM

## 2023-06-23 PROCEDURE — 1036F TOBACCO NON-USER: CPT | Performed by: STUDENT IN AN ORGANIZED HEALTH CARE EDUCATION/TRAINING PROGRAM

## 2023-06-23 PROCEDURE — 3017F COLORECTAL CA SCREEN DOC REV: CPT | Performed by: STUDENT IN AN ORGANIZED HEALTH CARE EDUCATION/TRAINING PROGRAM

## 2023-06-23 PROCEDURE — G8427 DOCREV CUR MEDS BY ELIG CLIN: HCPCS | Performed by: STUDENT IN AN ORGANIZED HEALTH CARE EDUCATION/TRAINING PROGRAM

## 2023-06-23 ASSESSMENT — PATIENT HEALTH QUESTIONNAIRE - PHQ9
SUM OF ALL RESPONSES TO PHQ QUESTIONS 1-9: 0
1. LITTLE INTEREST OR PLEASURE IN DOING THINGS: 0
SUM OF ALL RESPONSES TO PHQ QUESTIONS 1-9: 0
2. FEELING DOWN, DEPRESSED OR HOPELESS: 0
SUM OF ALL RESPONSES TO PHQ QUESTIONS 1-9: 0
SUM OF ALL RESPONSES TO PHQ QUESTIONS 1-9: 0
SUM OF ALL RESPONSES TO PHQ9 QUESTIONS 1 & 2: 0

## 2023-06-23 NOTE — PROGRESS NOTES
Consent: Maria Esther Harry, who was seen by synchronous (real-time) audio-video technology, and/or his healthcare decision maker, is aware that this patient-initiated, Telehealth encounter on 6/23/2023 is a billable service, with coverage as determined by his insurance carrier. He is aware that he may receive a bill and has provided verbal consent to proceed: YES-Consent obtained within past 12 months      Subjective:   Maria Esther Harry is a 71 y.o. male who was seen for Follow-up Chronic Condition    He was seen here couple months ago having right foot pain which was thought to be a bunion he eventually saw podiatry never tried his things it is actually gout. Uric acid was 7.6. Patient had labs and was found to have vitamin D in the 20s. He takes 2000 units vitamin D3 daily. Current Outpatient Medications on File Prior to Visit   Medication Sig Dispense Refill    amLODIPine (NORVASC) 5 MG tablet Take 1 tablet by mouth daily 30 tablet 5    atorvastatin (LIPITOR) 20 MG tablet Take 1 tablet by mouth daily      benazepril-hydrochlorthiazide (LOTENSIN HCT) 10-12.5 MG per tablet TAKE 1 TABLET DAILY FOR    BLOOD PRESSURE      vitamin D (CHOLECALCIFEROL) 25 MCG (1000 UT) TABS tablet Take 2 tablets by mouth daily      clopidogrel (PLAVIX) 75 MG tablet Take 1 tablet by mouth daily      latanoprost (XALATAN) 0.005 % ophthalmic solution Apply 1 drop to eye      levothyroxine (SYNTHROID) 100 MCG tablet Take 1 tablet by mouth daily      metroNIDAZOLE (METROGEL) 0.75 % gel Apply topically 2 times daily      tadalafil (CIALIS) 5 MG tablet Take 1 tablet by mouth daily      tamsulosin (FLOMAX) 0.4 MG capsule Take 1 capsule by mouth daily      testosterone cypionate (DEPOTESTOTERONE CYPIONATE) 200 MG/ML injection INJECT 1 ML INTRAMUSCULARLY EVERY 3 WEEKS       No current facility-administered medications on file prior to visit.      Allergies   Allergen Reactions    Simvastatin Myalgia    Imiquimod Rash     Patient Active Problem List

## 2023-06-23 NOTE — PROGRESS NOTES
Chief Complaint   Patient presents with    Follow-up Chronic Condition     1. Have you been to the ER, urgent care clinic since your last visit? Hospitalized since your last visit? No    2. Have you seen or consulted any other health care providers outside of the [address] [place] [address] since your last visit? No     3. For patients aged 39-70: Has the patient had a colonoscopy / FIT/ Cologuard? Yes-No Care Gap Present    If the patient is female:    4. For patients aged 41-77: Has the patient had a mammogram within the past 2 years? NA - based on age/sex      5. For patients aged 21-65: Has the patient had a pap smear?   NA - based on age/sex    PHQ-9 Total Score: 0 (6/23/2023  1:24 PM)    PLEASE SEND LINK TO [email or web address]

## 2023-08-05 NOTE — PATIENT INSTRUCTIONS

## 2023-08-05 NOTE — PROGRESS NOTES
CARDIOLOGY OFFICE NOTE    Alonso Vora MD, [facility] [address], [place]  Phone [phone]; Fax [phone]  Mobile [phone]   Voice Mail [phone]    Primary care: Millie Garcia MD       ATTENTION:   This medical record was transcribed using an electronic medical records/speech recognition system. Although proofread, it may and can contain electronic, spelling and other errors. Corrections may be executed at a later time. Please feel free to contact us for any clarifications as needed. Royal Cobos is a 68 y.o. male with  referred for  palpitations and lower extremity edema           Cardiac risk factors: dyslipidemia, HTN, male gender   I have personally obtained the history from the patient. HISTORY OF PRESENTING ILLNESS    Ms./Mr. Royal Cobos  68 y.o. is is being seen for a  history of dyslipidemia, LISBETH, and hypertension. Blood pressure readings at home were normal.    Encouraged him to stay hydrated. He has not described any chest pain or shortness of breath. He is now seeing lymphedema clinic. His edema improved significantly on the lower dose of amlodipine. Otherwise he has no interval cardiac issues.   He said his dizziness improved cardiomyopathy cardiomyopathy stop amlodipine     ACTIVE PROBLEM LIST     Patient Active Problem List    Diagnosis Date Noted    Raynaud's phenomenon without gangrene 05/17/2022    Paresthesia 02/10/2022    Lumbar radiculopathy 10/08/2021    Cervical radiculopathy 10/08/2021    LISBETH (obstructive sleep apnea) 07/20/2021    TIA (transient ischemic attack)     DDD (degenerative disc disease), cervical 03/21/2021    Other spondylosis with radiculopathy, lumbar region 03/21/2021    Cervical spondylosis with radiculopathy 03/21/2021    Other specified hypothyroidism 01/15/2021    Essential hypertension 01/15/2021    Mixed hyperlipidemia 01/15/2021    Benign prostatic hyperplasia 01/15/2021

## 2023-08-07 ENCOUNTER — TELEPHONE (OUTPATIENT)
Facility: CLINIC | Age: 73
End: 2023-08-07

## 2023-08-07 NOTE — TELEPHONE ENCOUNTER
Rosa Query with the lymphedema clinic wants to know if a fax was received for order for a re-eval to be done this week for pt    Call Rosa Query at [phone]

## 2023-08-08 ENCOUNTER — ANCILLARY PROCEDURE (OUTPATIENT)
Age: 73
End: 2023-08-08
Payer: MEDICARE

## 2023-08-08 ENCOUNTER — OFFICE VISIT (OUTPATIENT)
Age: 73
End: 2023-08-08
Payer: MEDICARE

## 2023-08-08 VITALS
BODY MASS INDEX: 31.52 KG/M2 | HEIGHT: 68 IN | HEART RATE: 61 BPM | OXYGEN SATURATION: 99 % | SYSTOLIC BLOOD PRESSURE: 110 MMHG | WEIGHT: 208 LBS | DIASTOLIC BLOOD PRESSURE: 54 MMHG

## 2023-08-08 DIAGNOSIS — G47.33 OBSTRUCTIVE SLEEP APNEA (ADULT) (PEDIATRIC): ICD-10-CM

## 2023-08-08 DIAGNOSIS — I10 ESSENTIAL (PRIMARY) HYPERTENSION: ICD-10-CM

## 2023-08-08 DIAGNOSIS — R00.2 PALPITATIONS: ICD-10-CM

## 2023-08-08 DIAGNOSIS — I10 ESSENTIAL (PRIMARY) HYPERTENSION: Primary | ICD-10-CM

## 2023-08-08 DIAGNOSIS — R60.0 LOCALIZED EDEMA: ICD-10-CM

## 2023-08-08 LAB
VAS LEFT CCA DIST EDV: 5.5 CM/S
VAS LEFT CCA DIST PSV: 94.4 CM/S
VAS LEFT CCA PROX EDV: 6.8 CM/S
VAS LEFT CCA PROX PSV: 132.7 CM/S
VAS LEFT ECA EDV: 0.93 CM/S
VAS LEFT ECA PSV: 297.5 CM/S
VAS LEFT ICA DIST EDV: 7.3 CM/S
VAS LEFT ICA DIST PSV: 40.3 CM/S
VAS LEFT ICA MID EDV: 9.9 CM/S
VAS LEFT ICA MID PSV: 87.4 CM/S
VAS LEFT ICA PROX EDV: 11.5 CM/S
VAS LEFT ICA PROX PSV: 105.7 CM/S
VAS LEFT ICA/CCA PSV: 0.8 NO UNITS
VAS LEFT VERTEBRAL EDV: 7.39 CM/S
VAS LEFT VERTEBRAL PSV: 56.5 CM/S
VAS RIGHT CCA DIST EDV: 5.6 CM/S
VAS RIGHT CCA DIST PSV: 95.6 CM/S
VAS RIGHT CCA PROX EDV: 4.6 CM/S
VAS RIGHT CCA PROX PSV: 111.3 CM/S
VAS RIGHT ECA EDV: 0.29 CM/S
VAS RIGHT ECA PSV: 179.9 CM/S
VAS RIGHT ICA DIST EDV: 11.9 CM/S
VAS RIGHT ICA DIST PSV: 67.8 CM/S
VAS RIGHT ICA MID EDV: 10.6 CM/S
VAS RIGHT ICA MID PSV: 119 CM/S
VAS RIGHT ICA PROX EDV: 8.6 CM/S
VAS RIGHT ICA PROX PSV: 103.5 CM/S
VAS RIGHT ICA/CCA PSV: 1.1 NO UNITS
VAS RIGHT VERTEBRAL EDV: 6.21 CM/S
VAS RIGHT VERTEBRAL PSV: 46.1 CM/S

## 2023-08-08 PROCEDURE — 99214 OFFICE O/P EST MOD 30 MIN: CPT | Performed by: SPECIALIST

## 2023-08-08 PROCEDURE — G8417 CALC BMI ABV UP PARAM F/U: HCPCS | Performed by: SPECIALIST

## 2023-08-08 PROCEDURE — 93880 EXTRACRANIAL BILAT STUDY: CPT | Performed by: SPECIALIST

## 2023-08-08 PROCEDURE — 1123F ACP DISCUSS/DSCN MKR DOCD: CPT | Performed by: SPECIALIST

## 2023-08-08 PROCEDURE — G8427 DOCREV CUR MEDS BY ELIG CLIN: HCPCS | Performed by: SPECIALIST

## 2023-08-08 PROCEDURE — 3074F SYST BP LT 130 MM HG: CPT | Performed by: SPECIALIST

## 2023-08-08 PROCEDURE — 3017F COLORECTAL CA SCREEN DOC REV: CPT | Performed by: SPECIALIST

## 2023-08-08 PROCEDURE — 1036F TOBACCO NON-USER: CPT | Performed by: SPECIALIST

## 2023-08-08 PROCEDURE — 3078F DIAST BP <80 MM HG: CPT | Performed by: SPECIALIST

## 2023-08-08 PROCEDURE — 93880 EXTRACRANIAL BILAT STUDY: CPT

## 2023-08-09 NOTE — TELEPHONE ENCOUNTER
Form was signed by Dr. Neri Ashby earlier this morning and I faxed it backed to the Lymphedema office

## 2023-08-10 ENCOUNTER — APPOINTMENT (OUTPATIENT)
Dept: PHYSICAL THERAPY | Age: 73
End: 2023-08-10

## 2023-08-10 ENCOUNTER — HOSPITAL ENCOUNTER (OUTPATIENT)
Facility: HOSPITAL | Age: 73
Setting detail: RECURRING SERIES
Discharge: HOME | End: 2023-08-13
Payer: MEDICARE

## 2023-08-10 PROCEDURE — 97535 SELF CARE MNGMENT TRAINING: CPT

## 2023-08-10 PROCEDURE — 97760 ORTHOTIC MGMT&TRAING 1ST ENC: CPT

## 2023-08-10 PROCEDURE — 97161 PT EVAL LOW COMPLEX 20 MIN: CPT

## 2023-08-10 NOTE — THERAPY EVALUATION
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 [x]   I87.2 Venous Insufficiency   [x]   I89.0 Lymphedema, other secondary  []   I83.019 Venous Stasis Ulcer LE, Right   []   I89.9 Unspecified Lymphatic Disorder []   I83.029 Venous Stasis Ulcer LE, Left   [x]   R60.9 Swelling not relieved by elevation []   Q82.0 Hereditary/ Congenital Lymphedema   []   C50.211 Malignant neoplasm of breast, Right []   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   []   C50.212 Malignant neoplasm of breast, Left []   L03.115 LE Cellulitis, Right   []    []   L03.116 LE Cellulitis, Left                                                           Darian Daily    1950  Page 2 of 2    Physician Order for DME for Diagnosis of Secondary Lymphedema as Listed on Statement of Medical Necessity, Page 1        Recommended Product:  Units Upper Extremity Rt Lt Units Lower Extremity Rt Lt    Circ-Aid, Ready Wrap, Sigvaris Arm    Inelastic binders (Circ-Aid, Farrow)  []Foot   []Below Knee   []Knee   []Thigh      Circ-Aid Ready Wrap, Sigvaris hand    Dane Kaushal, night use []Full Leg  []Lower Leg      Tribute Arm, night use    Tribute, night use  []Full Leg  []Lower Leg      Dane Kaushal Arm, night use    Michael Sleeve Leg/ Foot, night use      Gradiant Compression Sleeves & Gloves  []Custom [] RM Arm:  []CCL1 []CCL2 []CCL3  []Custom [] RM Glove: []CCL1 []CCL2 []CCL3     4 Gradient Compression Stockings   [x]Custom  [x]RM Lower Extremity:   [x]CCL1       [x]CCL2         []CCL3   [x]Knee       []Thigh        []Waist Length x x    Michael sleeve arm w/ hand, night use    Tribute Wrap, night use      Compression Bra          Compression Vest         The above patient was referred for treatment of Lymphedema due to the diagnosis indicated above.
LE      Circumferences:     Lower Extremity Girth (cm):  Right    Left      MTP: 25.0 24.9   Midfoot/navicular: 24.1 23.5   Ankle: 23.5 23.0   Calf:    (widest calf) 37.0 36.8       Volumetric Measurements:         Date:   Right  Left  % difference     8/10/23  2987.01  2883.39  3.47    2/20/23  3127.0  3081.06  1.47    8/31/22  3311.17  3175.09  4.11    2/24/22  3154.79  3112.82  1.33    7/22/20  3532.05  3420.34  3.16            15 min [x]Eval - untimed                        Therapeutic Procedures: Tx Min Billable or 1:1 Min (if diff from Tx Min) Procedure, Rationale, Specifics   30  [id number] Self Care/Home Management (timed):  improve patient knowledge and understanding of diagnosis/prognosis and physical therapy expectations, procedures and progression  to improve patient's ability to progress to PLOF and address remaining functional goals. (see flow sheet as applicable)    Details if applicable: The patient was re-educated regarding the role and function of the lymphatic system, and instructed in the lymphedema management protocol of complete decongestive therapy (CDT). This includes skin care to prevent breakdown or infection, lymphedema exercises, custom compression therapy options (bandaging, compression garments, compression pump, Doren Hemp, JoViPak, The Todd-Blair, etc. as needed), and decongestion with manual lymphatic drainage as indicated. We discussed the need for consistent compression system for lymphedema management. Diaphragmatic breathing instruction and skin care education was performed, applying low pH lotion to extremity using upward strokes to stimulate lymphatic vessels. 15  [id number] Orthotic Management and Training LE (timed): improve positioning of lower extremity during weight bearing and gait, improve pressure distrubution of the plantar aspect of the foot to improve patient's ability to progress to PLOF and address remaining functional goals.     Details if applicable:

## 2023-08-11 DIAGNOSIS — Z99.89 DEPENDENCE ON OTHER ENABLING MACHINES AND DEVICES: ICD-10-CM

## 2023-08-11 DIAGNOSIS — R60.0 LOCALIZED EDEMA: Primary | ICD-10-CM

## 2023-08-21 ENCOUNTER — APPOINTMENT (OUTPATIENT)
Dept: PHYSICAL THERAPY | Age: 73
End: 2023-08-21

## 2023-12-19 PROBLEM — Z87.898 HISTORY OF PREDIABETES: Status: ACTIVE | Noted: 2021-01-15

## 2024-01-05 ENCOUNTER — TELEPHONE (OUTPATIENT)
Facility: CLINIC | Age: 74
End: 2024-01-05

## 2024-01-05 DIAGNOSIS — E55.9 VITAMIN D DEFICIENCY, UNSPECIFIED: ICD-10-CM

## 2024-01-05 DIAGNOSIS — G45.9 TIA (TRANSIENT ISCHEMIC ATTACK): ICD-10-CM

## 2024-01-05 DIAGNOSIS — E78.2 MIXED HYPERLIPIDEMIA: ICD-10-CM

## 2024-01-05 DIAGNOSIS — E03.9 ACQUIRED HYPOTHYROIDISM: ICD-10-CM

## 2024-01-05 DIAGNOSIS — I10 ESSENTIAL HYPERTENSION: Primary | ICD-10-CM

## 2024-01-05 NOTE — TELEPHONE ENCOUNTER
Received a refill request, requesting a refill on:    1) vit d3  2) benazepril  3) clopidogrel  4) atorvastatin   5) levothyroxine

## 2024-01-06 RX ORDER — ATORVASTATIN CALCIUM 20 MG/1
20 TABLET, FILM COATED ORAL DAILY
Qty: 90 TABLET | Refills: 1 | Status: SHIPPED | OUTPATIENT
Start: 2024-01-06

## 2024-01-06 RX ORDER — LEVOTHYROXINE SODIUM 0.1 MG/1
100 TABLET ORAL DAILY
Qty: 90 TABLET | Refills: 1 | Status: SHIPPED | OUTPATIENT
Start: 2024-01-06

## 2024-01-06 RX ORDER — CLOPIDOGREL BISULFATE 75 MG/1
75 TABLET ORAL DAILY
Qty: 90 TABLET | Refills: 1 | Status: SHIPPED | OUTPATIENT
Start: 2024-01-06

## 2024-01-19 ENCOUNTER — TELEPHONE (OUTPATIENT)
Age: 74
End: 2024-01-19

## 2024-02-08 ENCOUNTER — HOSPITAL ENCOUNTER (OUTPATIENT)
Facility: HOSPITAL | Age: 74
Setting detail: RECURRING SERIES
Discharge: HOME | End: 2024-02-11
Payer: MEDICARE

## 2024-02-08 PROCEDURE — 97161 PT EVAL LOW COMPLEX 20 MIN: CPT

## 2024-02-08 PROCEDURE — 97760 ORTHOTIC MGMT&TRAING 1ST ENC: CPT

## 2024-02-08 PROCEDURE — 97535 SELF CARE MNGMENT TRAINING: CPT

## 2024-02-08 NOTE — THERAPY EVALUATION
[facility]   a part of [facility]  [address], [address]   [place] [address]                                                    Phone:[phone]   Fax:[phone]                                                                                 PHYSICAL THERAPY LYMPHEDEMA - MEDICARE EVALUATION/PLAN OF CARE NOTE (updated 3/23)      Date: 2024          Patient Name:  Claudio Salas :  1950   Medical   Diagnosis:  Lymphedema, not elsewhere classified [I89.0] Treatment Diagnosis:  I89.0     Lymphedema, not elsewhere classified and R60.9     Edema, unspecified    Referral Source:  Alonso Montanez MD Provider #:  [id number]                Insurance: Payor: MEDICARE / Plan: MEDICARE PART A AND B / Product Type: *No Product type* /        Patient  verified yes     Visit #   Current  / Total 1 6 per POC   Time   In / Out 0840 0950   Total Treatment Time 70   Total Timed Codes 45   1:1 Treatment Time 70      [facility] Totals Reminder:  bill using total billable   min of TIMED therapeutic procedures and modalities.   8-22 min = 1 unit; 23-37 min = 2 units; 38-52 min = 3 units;  53-67 min = 4 units; 68-82 min = 5 units         SUBJECTIVE  Pain Level (0-10 scale): pt reports increased pain at the feet by the end of the day when wearing stockings. Pt says stockings rub at 1st MTP, painful to the point where he has to remove stockings and pain improves.   []constant []intermittent []improving []worsening []no change since onset    Any medication changes, allergies to medications, adverse drug reactions, diagnosis change, or new procedure performed?: [x] No    [] Yes (see summary sheet for update)  Medications: Verified on Patient Summary List    Subjective functional status/changes:     Pt is a 72 yo male returning today for 6 month re-evaluation of bilateral LE secondary lymphedema. Pt continues to wear Juzo 3512 knee high stockings every day from morning until

## 2024-02-08 NOTE — THERAPY EVALUATION
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 [x]   I87.2 Venous Insufficiency   [x]   I89.0 Lymphedema, other secondary  []   I83.019 Venous Stasis Ulcer LE, Right   []   I89.9 Unspecified Lymphatic Disorder []   I83.029 Venous Stasis Ulcer LE, Left   [x]   R60.9 Swelling not relieved by elevation []   Q82.0 Hereditary/ Congenital Lymphedema   []   C50.211 Malignant neoplasm of breast, Right []   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   []   C50.212 Malignant neoplasm of breast, Left []   L03.115 LE Cellulitis, Right   []    []   L03.116 LE Cellulitis, Left                                                           Claudio Salas    1950  Page 2 of 2    Physician Order for DME for Diagnosis of Secondary Lymphedema as Listed on Statement of Medical Necessity, Page 1        Recommended Product:  Units Upper Extremity Rt Lt Units Lower Extremity Rt Lt    Circ-Aid, Ready Wrap, Sigvaris Arm    Inelastic binders (Circ-Aid, Farrow)  []Foot   []Below Knee   []Knee   []Thigh      Circ-Aid Ready Wrap, Sigvaris hand    Dane Kaushal, night use []Full Leg  []Lower Leg      Tribute Arm, night use    Tribute, night use  []Full Leg  []Lower Leg      Dane Kaushal Arm, night use    Michael Sleeve Leg/ Foot, night use      Gradiant Compression Sleeves & Gloves  []Custom [] RM Arm:  []CCL1 []CCL2 []CCL3  []Custom [] RM Glove: []CCL1 []CCL2 []CCL3     4 Gradient Compression Stockings   [x]Custom  []RM Lower Extremity:   []CCL1       [x]CCL2         []CCL3   [x]Knee       []Thigh        []Waist Length x x    Michael sleeve arm w/ hand, night use    Tribute Wrap, night use      Compression Bra          Compression Vest         The above patient was referred for treatment of Lymphedema due to the diagnosis indicated above.   supervision

## 2024-02-13 ENCOUNTER — APPOINTMENT (OUTPATIENT)
Facility: HOSPITAL | Age: 74
End: 2024-02-13
Payer: MEDICARE

## 2024-03-07 NOTE — PATIENT INSTRUCTIONS

## 2024-03-07 NOTE — PROGRESS NOTES
CARDIOLOGY OFFICE NOTE    Alonso Montanez MD, FAC    [address], [place] [address]  Phone [phone]; Fax [phone]  Mobile [phone]   Voice Mail [phone]    Primary care: Colette Oliva MD       ATTENTION:   This medical record was transcribed using an electronic medical records/speech recognition system.  Although proofread, it may and can contain electronic, spelling and other errors.  Corrections may be executed at a later time.  Please feel free to contact us for any clarifications as needed.           Claudio Salas is a 73 y.o. male with  referred for  palpitations and lower extremity edema           Cardiac risk factors: dyslipidemia, HTN, male gender   I have personally obtained the history from the patient.    HISTORY OF PRESENTING ILLNESS    Ms./Mr. Claudio Salas  73 y.o. is is being seen for a  history of dyslipidemia, LISBETH, and hypertension.  Doing well with no interval cardiac issues.  Currently on not on amlodipine.  Blood pressure looks great.  I reviewed his cholesterol profile with him from over the years.     ACTIVE PROBLEM LIST     Patient Active Problem List    Diagnosis Date Noted    Raynaud's phenomenon without gangrene 05/17/2022    Paresthesia 02/10/2022    Lumbar radiculopathy 10/08/2021    Cervical radiculopathy 10/08/2021    LISBETH (obstructive sleep apnea) 07/20/2021    TIA (transient ischemic attack)     DDD (degenerative disc disease), cervical 03/21/2021    Other spondylosis with radiculopathy, lumbar region 03/21/2021    Cervical spondylosis with radiculopathy 03/21/2021    Other specified hypothyroidism 01/15/2021    Essential hypertension 01/15/2021    Mixed hyperlipidemia 01/15/2021    Benign prostatic hyperplasia 01/15/2021    History of prediabetes 01/15/2021    Rosacea 01/15/2021    Hypogonadism in male 01/15/2021    Severe obesity (HCC) 09/10/2019           PAST MEDICAL HISTORY     History reviewed. No pertinent past

## 2024-03-08 ENCOUNTER — OFFICE VISIT (OUTPATIENT)
Age: 74
End: 2024-03-08
Payer: MEDICARE

## 2024-03-08 VITALS
HEIGHT: 68 IN | WEIGHT: 208 LBS | HEART RATE: 54 BPM | OXYGEN SATURATION: 99 % | SYSTOLIC BLOOD PRESSURE: 124 MMHG | BODY MASS INDEX: 31.52 KG/M2 | DIASTOLIC BLOOD PRESSURE: 62 MMHG

## 2024-03-08 DIAGNOSIS — R60.0 LOCALIZED EDEMA: ICD-10-CM

## 2024-03-08 DIAGNOSIS — G47.33 OBSTRUCTIVE SLEEP APNEA (ADULT) (PEDIATRIC): ICD-10-CM

## 2024-03-08 DIAGNOSIS — I10 ESSENTIAL (PRIMARY) HYPERTENSION: Primary | ICD-10-CM

## 2024-03-08 DIAGNOSIS — R00.2 PALPITATIONS: ICD-10-CM

## 2024-03-08 PROCEDURE — 3017F COLORECTAL CA SCREEN DOC REV: CPT | Performed by: SPECIALIST

## 2024-03-08 PROCEDURE — G8484 FLU IMMUNIZE NO ADMIN: HCPCS | Performed by: SPECIALIST

## 2024-03-08 PROCEDURE — 99214 OFFICE O/P EST MOD 30 MIN: CPT | Performed by: SPECIALIST

## 2024-03-08 PROCEDURE — 93005 ELECTROCARDIOGRAM TRACING: CPT | Performed by: SPECIALIST

## 2024-03-08 PROCEDURE — G8427 DOCREV CUR MEDS BY ELIG CLIN: HCPCS | Performed by: SPECIALIST

## 2024-03-08 PROCEDURE — 1036F TOBACCO NON-USER: CPT | Performed by: SPECIALIST

## 2024-03-08 PROCEDURE — 93010 ELECTROCARDIOGRAM REPORT: CPT | Performed by: SPECIALIST

## 2024-03-08 PROCEDURE — 1123F ACP DISCUSS/DSCN MKR DOCD: CPT | Performed by: SPECIALIST

## 2024-03-08 PROCEDURE — 3074F SYST BP LT 130 MM HG: CPT | Performed by: SPECIALIST

## 2024-03-08 PROCEDURE — 3078F DIAST BP <80 MM HG: CPT | Performed by: SPECIALIST

## 2024-03-08 PROCEDURE — G8417 CALC BMI ABV UP PARAM F/U: HCPCS | Performed by: SPECIALIST

## 2024-03-08 NOTE — PROGRESS NOTES
Chief Complaint   Patient presents with    Hypertension    Palpitations    Sleep Apnea     Vitals:    24 1001   BP: 124/62   Site: Left Upper Arm   Position: Sitting   Pulse: 54   SpO2: 99%   Weight: 94.3 kg (208 lb)   Height: 1.727 m (5' 8\")     Chest pain: DENIED     Recent hospital stays: DENIED     Refills: DENIED     NAME Claudio SHORT    1950      MRN    [id number]      LAST OFFICE APPOINTMENT: 2023     DIAGNOSIS    ICD-10-CM    1. Essential (primary) hypertension  I10 EKG 12 Lead      2. Obstructive sleep apnea (adult) (pediatric)  G47.33       3. Palpitations  R00.2 EKG 12 Lead      4. Localized edema  R60.0           HOME MEDICATION  Current Outpatient Medications   Medication Sig    vitamin D-3 (CHOLECALCIFEROL) 125 MCG (5000 UT) TABS Take 1 tablet by mouth daily    levothyroxine (SYNTHROID) 100 MCG tablet Take 1 tablet by mouth daily    clopidogrel (PLAVIX) 75 MG tablet Take 1 tablet by mouth daily    benazepril-hydrochlorthiazide (LOTENSIN HCT) 10-12.5 MG per tablet TAKE 1 TABLET DAILY FOR    BLOOD PRESSURE    atorvastatin (LIPITOR) 20 MG tablet Take 1 tablet by mouth daily    latanoprost (XALATAN) 0.005 % ophthalmic solution Apply 1 drop to eye    metroNIDAZOLE (METROGEL) 0.75 % gel Apply topically 2 times daily    tadalafil (CIALIS) 5 MG tablet Take 1 tablet by mouth daily    testosterone cypionate (DEPOTESTOTERONE CYPIONATE) 200 MG/ML injection INJECT 1 ML INTRAMUSCULARLY EVERY 3 WEEKS     No current facility-administered medications for this visit.       VITAL SIGNS  Wt Readings from Last 3 Encounters:   24 94.3 kg (208 lb)   23 91 kg (200 lb 9.6 oz)   23 94.3 kg (208 lb)     BP Readings from Last 3 Encounters:   24 124/62   23 100/60   23 (!) 110/54     Pulse Readings from Last 3 Encounters:   24 54   23 56   23 61         SPECIALTY COMMENTS  1. Echo   10/21/19-EF 60%, LAE, AV sclerosis, Mild AI/MR/TR/PI, Pulmonary arterial

## 2024-03-19 ENCOUNTER — TELEPHONE (OUTPATIENT)
Age: 74
End: 2024-03-19

## 2024-03-19 NOTE — TELEPHONE ENCOUNTER
Patient came into the office regarding paperwork that was sent over from the lymphedema Clinic for compression socks that are custom fit to his right and left leg. The lymphedema clinic told him that they have received a approval from Dr. Montanez for the right leg but they have sent a few faxes requesting approval for the left and have not heard back. Patient can be reached at [phone] with any further questions.

## 2024-03-21 ENCOUNTER — APPOINTMENT (OUTPATIENT)
Facility: HOSPITAL | Age: 74
End: 2024-03-21
Payer: MEDICARE

## 2024-04-01 DIAGNOSIS — E55.9 VITAMIN D DEFICIENCY, UNSPECIFIED: ICD-10-CM

## 2024-04-01 RX ORDER — METRONIDAZOLE 7.5 MG/G
GEL TOPICAL 2 TIMES DAILY
Qty: 45 G | Refills: 1 | Status: SHIPPED | OUTPATIENT
Start: 2024-04-01

## 2024-04-16 ENCOUNTER — HOSPITAL ENCOUNTER (OUTPATIENT)
Facility: HOSPITAL | Age: 74
Setting detail: RECURRING SERIES
Discharge: HOME | End: 2024-04-19
Payer: MEDICARE

## 2024-04-16 PROCEDURE — 97760 ORTHOTIC MGMT&TRAING 1ST ENC: CPT

## 2024-04-16 NOTE — PROGRESS NOTES
[facility]   a part of [facility]  [address], [address]   [place] [address]   Phone: [phone]  Fax: [phone]      PHYSICAL THERAPY PROGRESS NOTE  Patient Name:  Claudio Salas :  1950   Treatment/Medical Diagnosis: Lymphedema, not elsewhere classified [I89.0]   Referral Source:  Alonso Montanez MD     Date of Initial Visit:  24 Attended Visits:  2 Missed Visits:  0     SUMMARY OF TREATMENT/ASSESSMENT:  Pt seen today for fitting of new custom stockings that he just received at the end of last week. Medial and lateral foot lengths measured on the shorter side per pt request since pt c/o rubbing at 1st MTP hammer toe and requests to have foot length shortened for custom stockings. Cautioned pt that this may cause increased swelling in distal foot and toes. Pt to trial stockings for the next 2 days to ensure he tolerates them and that he does not experience increased swelling/numbness/tingling at distal foot/toes due to shortened foot length. Pt to call [facility] directly if he tolerates stockings to request additional 2 pair be made. If pt with any difficulties the next 2 days, pt to call clinic and schedule follow up to update measurements for garment remake.  Pt has met 2/3 PT goals.     CURRENT STATUS/GOALS    Patient to perform 5/5 lymphedema remedial exercises in session with modified independence utilizing HEP handout, in order to promote optimal independence with management of condition, as well as promote optimal limb volume reduction required for proper fit of donned clothing in 6 weeks.   Status at last Eval/Progress Note: not met   Current Status: Met 24  Goal Met?  yes    2.  Patient/Caregiver to verbalize 3/3 signs and symptoms of infection without external cueing, in order to promote optimal self-management of condition in 6 weeks.   Status at last Eval/Progress Note: not met   Current Status: Met 24  Goal Met?  yes    3. 
lateral foot lengths measured on the shorter side per pt request since pt c/o rubbing at 1st MTP hammer toe and requests to have foot length shortened for custom stockings. Cautioned pt that this may cause increased swelling in distal foot and toes. Pt to trial stockings for the next 2 days to ensure he tolerates them and that he does not experience increased swelling/numbness/tingling at distal foot/toes due to shortened foot length. Pt to call [facility] directly if he tolerates stockings to request additional 2 pair be made. If pt with any difficulties the next 2 days, pt to call clinic and schedule follow up to update measurements for garment remake.   Patient will continue to benefit from skilled PT / OT services to analyze compression product fit and use, instruct in home and community integration, instruct in home lymphedema management program, and measure for compression products to address functional deficits and attain remaining goals.    Progress toward goals / Updated goals:  [x]  See Progress Note/Recertification    Short Term Goals: To be accomplished in 3 treatments.  1.   Patient to perform 5/5 lymphedema remedial exercises in session with modified independence utilizing HEP handout, in order to promote optimal independence with management of condition, as well as promote optimal limb volume reduction required for proper fit of donned clothing in 6 weeks. Met 4/16/24  2.   Patient/Caregiver to verbalize 3/3 signs and symptoms of infection without external cueing, in order to promote optimal self-management of condition in 6 weeks. Met 4/16/24     Long Term Goals: To be accomplished in 6 treatments.  Patient/caregiver will demonstrate improved edema management as evidenced by performing donning/doffing of garments modified independent 3/3 times within session to aid in reducing risk for infection and promote transition to maintenance phase of CDT in 12 weeks. Not met, progressing towards goal       PLAN  Yes

## 2024-05-10 ENCOUNTER — TELEMEDICINE (OUTPATIENT)
Facility: CLINIC | Age: 74
End: 2024-05-10

## 2024-05-10 DIAGNOSIS — I10 ESSENTIAL HYPERTENSION: ICD-10-CM

## 2024-05-10 DIAGNOSIS — U07.1 COVID: Primary | ICD-10-CM

## 2024-05-10 DIAGNOSIS — E66.9 CLASS 1 OBESITY WITH SERIOUS COMORBIDITY AND BODY MASS INDEX (BMI) OF 31.0 TO 31.9 IN ADULT, UNSPECIFIED OBESITY TYPE: ICD-10-CM

## 2024-05-10 DIAGNOSIS — Z86.73 HISTORY OF STROKE: ICD-10-CM

## 2024-05-10 ASSESSMENT — PATIENT HEALTH QUESTIONNAIRE - PHQ9
2. FEELING DOWN, DEPRESSED OR HOPELESS: NOT AT ALL
SUM OF ALL RESPONSES TO PHQ QUESTIONS 1-9: 0
SUM OF ALL RESPONSES TO PHQ9 QUESTIONS 1 & 2: 0
1. LITTLE INTEREST OR PLEASURE IN DOING THINGS: NOT AT ALL
SUM OF ALL RESPONSES TO PHQ QUESTIONS 1-9: 0

## 2024-05-10 NOTE — PROGRESS NOTES
Claudio Salas is a 73 y.o. male who was seen by synchronous (real-time) audio-video technology.     Consent:  Patient and/or their healthcare decision maker is aware that this patient-initiated Telehealth encounter is a billable service, with coverage as determined by their insurance carrier. They are aware that they may receive a bill and have provided verbal consent to proceed: Yes    I was in the office while conducting this encounter.    Platform: Wavestream    HPI: Pt is a 73 y.o. male who presents for     COVID: He started having sore throat 2 days ago, then yesterday developed fatigue and congestion. This morning his temp was 99.3 and he took a COVID test that was positive. His wife tested positive 5 days ago. Pt has a h/o stroke, HTN, obesity and LISBETH and has taken Paxlovid for COVID once in the past without any side effects.       Past Medical History:   Diagnosis Date    BPH (benign prostatic hyperplasia)     HLD (hyperlipidemia)     HTN (hypertension)     Hypothyroidism     Multilevel degenerative disc disease     LISBETH (obstructive sleep apnea)     Raynaud's phenomenon     TIA (transient ischemic attack)        History reviewed. No pertinent family history.    Social History     Tobacco Use    Smoking status: Never    Smokeless tobacco: Never   Substance Use Topics    Alcohol use: Never       ROS:  Per HPI    PE:  There were no vitals taken for this visit.  Gen: Pt in NAD  Head: Normocephalic, atraumatic  Eyes: Sclera anicteric, EOM grossly intact  Throat: MMM  Neck: Supple  Resp: Speaking easily in full sentences without respiratory distress  Neuro: Alert, oriented, appropriate      A/P:     ICD-10-CM    1. COVID  U07.1 nirmatrelvir/ritonavir 300/100 (PAXLOVID, 300/100,) 20 x 150 MG & 10 x 100MG TBPK      2. History of stroke  Z86.73       3. Essential hypertension  I10       4. Class 1 obesity with serious comorbidity and body mass index (BMI) of 31.0 to 31.9 in adult, unspecified obesity type  E66.9

## 2024-05-10 NOTE — PROGRESS NOTES
Chief Complaint   Patient presents with    Positive For Covid-19     Tested positive this morning, symptoms started yesterday.       \"Have you been to the ER, urgent care clinic since your last visit?  Hospitalized since your last visit?\"    NO    “Have you seen or consulted any other health care providers outside of [facility] since your last visit?”    NO      “Have you had a colorectal cancer screening such as a colonoscopy/FIT/Cologuard?    NO ON THE WAITING LIST    Date of last Colonoscopy: 7/13/2018  No cologuard on file  No FIT/FOBT on file   No flexible sigmoidoscopy on file         Click Here for Release of Records Request      PLEASE SEND LINK -[phone]

## 2024-05-19 NOTE — PROGRESS NOTES
[facility]  Lymphedema Clinic and Cancer Rehabilitation  [address] [place] [address].  [address]  [address]      INITIAL EVALUATION    NAME: Praneeth Grace AGE: 80 y.o. GENDER: male  DATE: 02/24/22  REFERRING PHYSICIAN: Andrey Cruz MD (cardiology)  HISTORY AND BACKGROUND:  Pt is a 79 yo male returning today for 6 month re-evaluation of bilateral LE secondary lymphedema. Pt continues to wear Juzo 3512 knee high stockings every day from morning until evening. Pt says he is able to don/doff stockings independently using medi donning cerna and dycem. Pt denies changes to medical history since last visit. From prior evaluation: Pt reports 20+ year history of fluctuating LE edema, mostly in the foot and ankle, exacerbated by varicose veins in the last 3 years. Pt with history of B TKA and neuroma on right foot. Previous doppler negative for DVT. Primary Diagnosis:  · BLE lymphedema, secondary (I89.0)  Other Treatment Diagnoses:   Swelling not relieved by elevation (R60.0 localized edema,)   Venous insufficiency I87.2;   Date of Onset: reports a 20+ year history of fluctuating LE edema, mostly in the foot and ankle. In the past few years exacerbated by varicose issues  Present Symptoms and Functional Limitations: LE swelling has fluctuations and difficulty with wearing shoes. Reports improvement since initiating stocking wear in July 2020. Lower Extremity Functional Scale: 6/68, 9% impairment     Past Medical History:   Past Medical History:   Diagnosis Date    Benign prostatic hyperplasia 1/15/2021    Blood glucose abnormal 1/15/2021    Essential hypertension 1/15/2021    Hypogonadism in male 1/15/2021    Mixed hyperlipidemia 1/15/2021    Other specified hypothyroidism 1/15/2021    Palpitations     Prediabetes 1/15/2021    Rosacea 1/15/2021    TIA (transient ischemic attack)     Per patient. Negative imgaing.      Past Surgical History:   Procedure Laterality Date    HX CATARACT REMOVAL  08/2021    HX CIRCUMCISION  2001    HX COLONOSCOPY  07/13/2018    2 adenomatous polyps removed -Dr. Stern [place] HX COLONOSCOPY  01/01/2012    HX CYST REMOVAL  1963    pilonidal cyst surgery    HX HERNIA REPAIR      HX KNEE REPLACEMENT Bilateral     HX ORTHOPAEDIC      Neuroma on foot removed     HX VASECTOMY      HX VEIN STRIPPING  12/2008    Dr. Farmer Certain     Current Medications:    Current Outpatient Medications   Medication Sig    metroNIDAZOLE (METROGEL) 0.75 % topical gel APPLY TOPICALLY TO THE AFFECTED AREA TWICE DAILY    amLODIPine (NORVASC) 2.5 mg tablet Take 1 Tablet by mouth daily.  tadalafiL (CIALIS) 5 mg tablet Take 5 mg by mouth daily.  solifenacin (VESICARE) 10 mg tablet 10 mg.    tamsulosin (FLOMAX) 0.4 mg capsule Take 0.4 mg by mouth.  benazepril-hydroCHLOROthiazide (LOTENSIN HCT) 10-12.5 mg per tablet TAKE 1 TABLET DAILY FOR    BLOOD PRESSURE    gabapentin (NEURONTIN) 100 mg capsule Take 2 Capsules by mouth three (3) times daily. Max Daily Amount: 600 mg.  testosterone enanthate (Xyosted) 75 mg/0.5 mL atIn 0.5 mL by SubCUTAneous route Every Saturday.  cholecalciferol (VITAMIN D3) (1000 Units /25 mcg) tablet Take 2 Tablets by mouth daily.  atorvastatin (LIPITOR) 20 mg tablet TAKE 1 TABLET DAILY    Synthroid 100 mcg tablet TAKE 1 TABLET EVERY MORNING    clopidogreL (PLAVIX) 75 mg tab TAKE 1 TABLET ONCE DAILY    latanoprost (XALATAN) 0.005 % ophthalmic solution Administer 1 Drop to both eyes nightly. No current facility-administered medications for this encounter. Allergies: Allergies   Allergen Reactions    Zocor [Simvastatin] Myalgia      Prior Level of Function/Social/Work History/Personal factors and/or comorbidities impacting plan of care: still working as a special  for [employer], active with yard work and grandkids. Pt says he plans to retire next year.     Living Situation: home      Trainable Caregiver?: wife   Self-care/ADLs: independent     Mobility: independent   Sleeping Arrangement:  In a bed   Adaptive Equipment Owned: none   Other:   Previous Therapy:  Seen at Lymphedema clinic in July 2020, re-eval in Feb 2021 and Aug 2021. Compression/Lymphedema Equipment:  [employer]zo 3512 knee high stockings, size III regular, closed toe (4 pair). SUBJECTIVE:   Patient reports good tolerance to knee high stockings and feels swelling is stable. Patients goals for therapy: \"keep swelling under control,\" recheck volumes and obtain new compression garments. OBJECTIVE DATA SUMMARY:   EXAMINATION/PRESENTATION/DECISION MAKING:   Pain:   0/10        Self-Care and ADLs:  Grooming: Independent  Bathing: Independent    UB Dressing: Independent  LB Dressing: Independent    Don/Doff Shoes/Socks:  Independent  Toileting: Independent    Other:       Skin and Tissue Assessment:  Dermal Status:  [x]   Intact []  Dry   []  Tenuous [] Flaky   []  Wound/lesion present [x]  Scars: B TKA   []  Dermatitis    Texture/Consistency:  []  Boggy []  Pitting Edema: 1+ pitting lower legs, malleolar region    []  Brawny []  Combination   []  Fibrotic/Woody    Pigmentation/Color Change:  []  Normal []  Hemosiderin   []  Red []  Erythematous   [x]  Hyperpigmented: around feet/ankles  []  Hyperlipodermatosclerosis   Anomalies:  []  Lymphorrhea []  Vesicles   []  Petechiae []  Warty Vercusis   []  Bullae []  Papilloma   Circulatory:  []  XENA [x]  Varicosities: varicous veins present   [x]  Pulses palpable  [x]  Vascular studies ruled out DVT in past   []  DVT History    Nails:  [x]   Normal  []   Fungus  Stemmers Sign: negative  Height:    Weight:     BMI:     (36 or greater: adversely affecting lymphedema)  Wound/Ulcer:  no      Wound Pain:   none  Volumetric Measurements:   Today 2/24/2022  Right:  3154.79 mL Left:  3112.82 mL   % Difference: 1.33% Dominance: Right   (See scanned graph)    7/22/2020:  Right:  3535.05 mL Left:  3420.34 mL   % Difference: 3.16 Dominance: Right   (See scanned graph)  Range of Motion: WFL  Strength: WFL  Sensation:  Intact  Mobility:  Bed/Chair Mobility:  Independent  Transfers:  Independent    Sitting Balance:  intact Standing Balance:  intact   Gait:  Independent  Wheelchair Mobility:  n/a   Endurance:  good Stairs:  Independent          Safety:  Patient is alert and oriented:  x4   Safety awareness:  yes   Fall Risk?:  no   Patient given written fall prevention handout: Yes   Precautions:  Standard lymphedema precautions to include avoiding blood pressure readings, injections and IVs or other procedures/acts that could lead to broken skin on affected area, and avoiding excessive heat, resistive activity or altitude without compression garment    Physical Therapy Evaluation Charge Determination   History Examination Presentation Decision-Making   MEDIUM  Complexity : 1-2 comorbidities / personal factors will impact the outcome/ POC  LOW Complexity : 1-2 Standardized tests and measures addressing body structure, function, activity limitation and / or participation in recreation  LOW Complexity : Stable, uncomplicated  Other outcome measures LLIS  LOW       Based on the above components, the patient evaluation is determined to be of the following complexity level: LOW     Evaluation Time: 2:15 - 2:30 pm (15 minutes)    TREATMENT PROVIDED:   1. Treatment description:  Manual therapy x 1  Patient with combination edema complicated by vascular insufficiency. Reviewed limb volumes as above and garment measurements. Patient measured for replacement [employer] 3512 CCL2 knee high, beige and black pair, closed toe stockings for bilateral LE, size III regular. Pt asked about open toe, however feel that these may cause irritation due to neuroma. Pt in agreement. DME order sent to vendor.  Explained to pt that this may take several weeks to receive stockings due to need for signed eval/LMN from MD and then obtaining insurance authorization. Pt to fit garments on his own and contact clinic with any issues prior to next 6 month re-evaluation. Treatment time:  2:30 - 2:40 pm  Minutes: 10    ASSESSMENT:   Katja Rankin is a 80 y.o. male who presents with Stage II combination edema with venous insufficiency. Patient's limb volumes have reduced since transitioning to Juzo dynamic stockings in July 2020. Patient's leg discrepancy 1.33% and able to be measured for replacement RM garments, Juzo 3512 size III regular, closed toe. DME order sent to vendor. Explained to pt that this may take several weeks to receive stockings due to need for signed eval/LMN from MD and then obtaining insurance authorization. Pt to fit garments on his own since no change in size/style indicated and return to clinic in 6 months for re-evaluation. Patient continues to benefit from aspects of complete decongestive therapy (CDT) including manual lymphatic drainage (MLD) technique, short-stretch compression system to maintain limb volumes, and kinesiotaping as appropriate. Patient will receive instruction in proper skin care to recognize signs/symptoms of and prevent infection, therapeutic exercise, and self-MLD for independent home program and restorative lymphatic performance. This care is medically necessary due to the infection risk with lymphedema, and to improve functional activities. CDT is necessary to resolve swelling to allow patient to return to wearing normal clothes/footwear, and prevent worsening of symptoms, such as venous stasis ulcerations, infections, or hospitalizations. Patient will be independent with home program strategies to allow improved ADL ability and mobility and to allow patient to return to greatest functional independence. PLAN OF CARE:   Recommendations and Planned Interventions:  No f/u visits indicated. Pt to return in 6 months for re-evaluation or sooner as needed. Patient has participated in goal setting and agrees to work toward plan of care. Patient was instructed to call if questions or concerns arise. Thank you for this referral.  Cristel Parks. Sera, PT, DPT, CLT-OTTONIEL    Time Calculation: 25 mins    TREATMENT PLAN EFFECTIVE DATES:   2/24/2022 TO 5/19/2022  I have read the above plan of care for [address] - [facility] DESTINEE RUSSELL. I certify the above prescribed services are required by this patient and are medically necessary.   The above plan of care has been developed in conjunction with the lymphedema/physical therapist.       Physician Signature: ____________________________________Date:______________ never

## 2024-05-23 ENCOUNTER — OFFICE VISIT (OUTPATIENT)
Age: 74
End: 2024-05-23

## 2024-05-23 VITALS
WEIGHT: 202.4 LBS | RESPIRATION RATE: 18 BRPM | DIASTOLIC BLOOD PRESSURE: 59 MMHG | TEMPERATURE: 98.4 F | HEART RATE: 66 BPM | OXYGEN SATURATION: 96 % | HEIGHT: 68 IN | BODY MASS INDEX: 30.68 KG/M2 | SYSTOLIC BLOOD PRESSURE: 129 MMHG

## 2024-05-23 DIAGNOSIS — U07.1 COVID: Primary | ICD-10-CM

## 2024-05-23 LAB
Lab: ABNORMAL
PERFORMING INSTRUMENT: ABNORMAL
QC PASS/FAIL: ABNORMAL
SARS-COV-2, POC: DETECTED

## 2024-05-23 RX ORDER — BENZONATATE 200 MG/1
200 CAPSULE ORAL 3 TIMES DAILY PRN
Qty: 30 CAPSULE | Refills: 0 | Status: SHIPPED | OUTPATIENT
Start: 2024-05-23 | End: 2024-06-02

## 2024-05-23 RX ORDER — METHYLPREDNISOLONE 4 MG/1
TABLET ORAL
Qty: 1 KIT | Refills: 0 | Status: SHIPPED | OUTPATIENT
Start: 2024-05-23 | End: 2024-05-29

## 2024-05-24 ENCOUNTER — TELEPHONE (OUTPATIENT)
Age: 74
End: 2024-05-24

## 2024-05-24 DIAGNOSIS — J22 LOWER RESPIRATORY INFECTION: Primary | ICD-10-CM

## 2024-05-24 RX ORDER — AZITHROMYCIN 250 MG/1
TABLET, FILM COATED ORAL
Qty: 6 TABLET | Refills: 0 | Status: SHIPPED | OUTPATIENT
Start: 2024-05-24 | End: 2024-06-03

## 2024-05-24 NOTE — PROGRESS NOTES
Claudio Salas (:  1950) is a 73 y.o. male,New patient, here for evaluation of the following chief complaint(s):  Covid Testing (Sx started on the 9th. May 10th COVID  test was positive. Coughing. Pt wants a COVID test)        SUBJECTIVE/OBJECTIVE:    History provided by:  Patient       73 y.o. male presents with symptoms of cough and coryzal symptoms. Was sick 2024 and took a Covid test 5/10/2024 and was positive. He did a telehealth visit with his PCP and was prescribed Paxlovid, he was feeling better, thought he was ok, then it the last 2 days took a turn and now coughing more, increased fatigue. No fever. He is worried he might have a pneumonia.    No history of asthma or COPD. History of LISBETH on CPAP, uses every night, HTN, TIA on Plavix, Raynaud's.       Vitals:    24   BP: (!) 129/59   Pulse: 66   Resp: 18   Temp: 98.4 °F (36.9 °C)   SpO2: 96%   Weight: 91.8 kg (202 lb 6.4 oz)   Height: 1.727 m (5' 8\")       Results for orders placed or performed in visit on 24   POCT COVID-19, Antigen   Result Value Ref Range    SARS-COV-2, POC Detected (A) Not Detected    Lot Number [id number]     QC Pass/Fail PASS     Performing Instrument BinaxNOW         Physical Exam  Constitutional:       General: He is not in acute distress.     Appearance: Normal appearance. He is not ill-appearing or toxic-appearing.   HENT:      Head: Normocephalic and atraumatic.      Right Ear: Tympanic membrane, ear canal and external ear normal.      Left Ear: Tympanic membrane, ear canal and external ear normal.      Nose: Nose normal.      Mouth/Throat:      Mouth: Mucous membranes are moist.      Pharynx: Posterior oropharyngeal erythema present. No oropharyngeal exudate.   Eyes:      General:         Right eye: No discharge.         Left eye: No discharge.      Conjunctiva/sclera: Conjunctivae normal.   Cardiovascular:      Rate and Rhythm: Normal rate and regular rhythm.      Heart sounds: Normal heart sounds. No

## 2024-05-24 NOTE — PATIENT INSTRUCTIONS
Covid with worsening cough -  Awaiting final read of chest x-ray, we will call you with result  If shows a pneumonia we will start you on an antibiotic  Start Medrol dose pack  Can take Tessalon for cough as needed  Drink plenty fluids  Rest  Call or return to clinic if no improvement or any worsening

## 2024-06-24 DIAGNOSIS — E78.2 MIXED HYPERLIPIDEMIA: ICD-10-CM

## 2024-06-24 DIAGNOSIS — G45.9 TIA (TRANSIENT ISCHEMIC ATTACK): ICD-10-CM

## 2024-06-24 DIAGNOSIS — E03.9 ACQUIRED HYPOTHYROIDISM: ICD-10-CM

## 2024-06-24 DIAGNOSIS — I10 ESSENTIAL HYPERTENSION: ICD-10-CM

## 2024-06-25 RX ORDER — CLOPIDOGREL BISULFATE 75 MG/1
75 TABLET ORAL DAILY
Qty: 90 TABLET | Refills: 1 | Status: SHIPPED | OUTPATIENT
Start: 2024-06-25

## 2024-06-25 RX ORDER — ATORVASTATIN CALCIUM 20 MG/1
20 TABLET, FILM COATED ORAL DAILY
Qty: 90 TABLET | Refills: 1 | Status: SHIPPED | OUTPATIENT
Start: 2024-06-25

## 2024-06-25 RX ORDER — LEVOTHYROXINE SODIUM 0.1 MG/1
100 TABLET ORAL DAILY
Qty: 90 TABLET | Refills: 1 | Status: SHIPPED | OUTPATIENT
Start: 2024-06-25

## 2024-06-26 ENCOUNTER — TELEPHONE (OUTPATIENT)
Age: 74
End: 2024-06-26

## 2024-09-10 LAB
ALBUMIN SERPL-MCNC: 4.1 G/DL (ref 3.8–4.8)
ALP SERPL-CCNC: 78 IU/L (ref 44–121)
ALT SERPL-CCNC: 12 IU/L (ref 0–44)
AST SERPL-CCNC: 15 IU/L (ref 0–40)
BILIRUB DIRECT SERPL-MCNC: 0.14 MG/DL (ref 0–0.4)
BILIRUB SERPL-MCNC: 0.5 MG/DL (ref 0–1.2)
CHOLEST SERPL-MCNC: 118 MG/DL (ref 100–199)
HDLC SERPL-MCNC: 42 MG/DL
LDLC SERPL CALC-MCNC: 60 MG/DL (ref 0–99)
PROT SERPL-MCNC: 6.1 G/DL (ref 6–8.5)
TRIGL SERPL-MCNC: 77 MG/DL (ref 0–149)
VLDLC SERPL CALC-MCNC: 16 MG/DL (ref 5–40)

## 2024-09-17 ENCOUNTER — OFFICE VISIT (OUTPATIENT)
Age: 74
End: 2024-09-17
Payer: MEDICARE

## 2024-09-17 VITALS
RESPIRATION RATE: 18 BRPM | SYSTOLIC BLOOD PRESSURE: 128 MMHG | HEIGHT: 68 IN | OXYGEN SATURATION: 95 % | DIASTOLIC BLOOD PRESSURE: 60 MMHG | WEIGHT: 201.6 LBS | BODY MASS INDEX: 30.55 KG/M2 | HEART RATE: 66 BPM

## 2024-09-17 DIAGNOSIS — I10 ESSENTIAL (PRIMARY) HYPERTENSION: Primary | ICD-10-CM

## 2024-09-17 DIAGNOSIS — G47.33 OBSTRUCTIVE SLEEP APNEA (ADULT) (PEDIATRIC): ICD-10-CM

## 2024-09-17 DIAGNOSIS — E78.5 DYSLIPIDEMIA: ICD-10-CM

## 2024-09-17 DIAGNOSIS — Z86.73 HISTORY OF TIA (TRANSIENT ISCHEMIC ATTACK): ICD-10-CM

## 2024-09-17 PROCEDURE — 3017F COLORECTAL CA SCREEN DOC REV: CPT

## 2024-09-17 PROCEDURE — 3078F DIAST BP <80 MM HG: CPT

## 2024-09-17 PROCEDURE — 99214 OFFICE O/P EST MOD 30 MIN: CPT

## 2024-09-17 PROCEDURE — 1123F ACP DISCUSS/DSCN MKR DOCD: CPT

## 2024-09-17 PROCEDURE — 1036F TOBACCO NON-USER: CPT

## 2024-09-17 PROCEDURE — G8427 DOCREV CUR MEDS BY ELIG CLIN: HCPCS

## 2024-09-17 PROCEDURE — G8417 CALC BMI ABV UP PARAM F/U: HCPCS

## 2024-09-17 PROCEDURE — 3074F SYST BP LT 130 MM HG: CPT

## 2024-09-30 DIAGNOSIS — R00.2 PALPITATIONS: ICD-10-CM

## 2024-09-30 DIAGNOSIS — G47.33 OBSTRUCTIVE SLEEP APNEA (ADULT) (PEDIATRIC): ICD-10-CM

## 2024-09-30 DIAGNOSIS — R60.0 LOCALIZED EDEMA: Primary | ICD-10-CM

## 2024-09-30 DIAGNOSIS — Z86.73 HISTORY OF TIA (TRANSIENT ISCHEMIC ATTACK): ICD-10-CM

## 2024-09-30 DIAGNOSIS — I10 ESSENTIAL (PRIMARY) HYPERTENSION: ICD-10-CM

## 2024-09-30 DIAGNOSIS — E78.5 DYSLIPIDEMIA: ICD-10-CM

## 2024-10-18 ENCOUNTER — HOSPITAL ENCOUNTER (OUTPATIENT)
Facility: HOSPITAL | Age: 74
Setting detail: RECURRING SERIES
Discharge: HOME | End: 2024-10-21
Payer: MEDICARE

## 2024-10-18 PROCEDURE — 97535 SELF CARE MNGMENT TRAINING: CPT

## 2024-10-18 PROCEDURE — 97760 ORTHOTIC MGMT&TRAING 1ST ENC: CPT

## 2024-10-18 PROCEDURE — 97161 PT EVAL LOW COMPLEX 20 MIN: CPT

## 2024-10-18 NOTE — THERAPY EVALUATION
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 [x]   I87.2 Venous Insufficiency   [x]   I89.0 Lymphedema, other secondary  []   I83.019 Venous Stasis Ulcer LE, Right   []   I89.9 Unspecified Lymphatic Disorder []   I83.029 Venous Stasis Ulcer LE, Left   [x]   R60.9 Swelling not relieved by elevation []   Q82.0 Hereditary/ Congenital Lymphedema   []   C50.211 Malignant neoplasm of breast, Right []   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   []   C50.212 Malignant neoplasm of breast, Left []   L03.115 LE Cellulitis, Right   []    []   L03.116 LE Cellulitis, Left                                                           Claudio Salas    1950  Page 2 of 2    Physician Order for DME for Diagnosis of Secondary Lymphedema as Listed on Statement of Medical Necessity, Page 1        Recommended Product:  Units Upper Extremity Rt Lt Units Lower Extremity Rt Lt    Circ-Aid, Ready Wrap, Sigvaris Arm    Inelastic binders (Circ-Aid, Farrow)  []Foot   []Below Knee   []Knee   []Thigh      Circ-Aid Ready Wrap, Sigvaris hand    Dane Kaushal, night use []Full Leg  []Lower Leg      Tribute Arm, night use    Tribute, night use  []Full Leg  []Lower Leg      Dane Kaushal Arm, night use    Michael Sleeve Leg/ Foot, night use      Gradiant Compression Sleeves & Gloves  []Custom [] RM Arm:  []CCL1 []CCL2 []CCL3  []Custom [] RM Glove: []CCL1 []CCL2 []CCL3     6 Gradient Compression Stockings   [x]Custom  []RM Lower Extremity:   []CCL1       [x]CCL2         []CCL3   [x]Knee       []Thigh        []Waist Length x x    Michael sleeve arm w/ hand, night use    Tribute Wrap, night use      Compression Bra          Compression Vest         The above patient was referred for treatment of Lymphedema due to the diagnosis indicated above.

## 2024-10-18 NOTE — THERAPY EVALUATION
[facility]hedema [facility]   a part of [facility]  [address], [address]   [place] [address]                                                    Phone:[phone]   Fax:[phone]                                                                                 PHYSICAL THERAPY LYMPHEDEMA - MEDICARE EVALUATION/PLAN OF CARE NOTE (updated 3/23)      Date: 10/18/2024          Patient Name:  Claudio Salas :  1950   Medical   Diagnosis:  Localized edema [R60.0]  Essential (primary) hypertension [I10]  Obstructive sleep apnea (adult) (pediatric) [G47.33]  History of TIA (transient ischemic attack) [Z86.73]  Dyslipidemia [E78.5]  Palpitations [R00.2] Treatment Diagnosis:  I89.0     Lymphedema, not elsewhere classified and R60.9     Edema, unspecified    Referral Source:  Rosalba Chu, APR* Provider #:  [id number]                Insurance: Payor: MEDICARE / Plan: MEDICARE PART A AND B / Product Type: *No Product type* /        Patient  verified yes     Visit #   Current  / Total 1 N/A   Time   In / Out 1055 1155   Total Treatment Time 60   Total Timed Codes 40   1:1 Treatment Time 40      [facility] Totals Reminder:  bill using total billable   min of TIMED therapeutic procedures and modalities.   8-22 min = 1 unit; 23-37 min = 2 units; 38-52 min = 3 units;  53-67 min = 4 units; 68-82 min = 5 units         SUBJECTIVE  Pain Level (0-10 scale): 2/10 L shoulder   []constant []intermittent []improving []worsening []no change since onset    Any medication changes, allergies to medications, adverse drug reactions, diagnosis change, or new procedure performed?: [x] No    [] Yes (see summary sheet for update)  Medications: Verified on Patient Summary List    Subjective functional status/changes:     Pt is a 73 yo male returning today for 6 month re-evaluation of bilateral LE secondary lymphedema. Pt continues to wear stockings daily. Pt reports improved tolerance for custom stockings,

## 2024-10-22 ENCOUNTER — HOSPITAL ENCOUNTER (OUTPATIENT)
Facility: HOSPITAL | Age: 74
Discharge: HOME | End: 2024-10-25
Payer: MEDICARE

## 2024-10-22 DIAGNOSIS — S46.012A TRAUMATIC TEAR OF LEFT ROTATOR CUFF, UNSPECIFIED TEAR EXTENT, INITIAL ENCOUNTER: ICD-10-CM

## 2024-10-22 DIAGNOSIS — M25.512 ACUTE PAIN OF LEFT SHOULDER: ICD-10-CM

## 2024-10-22 PROCEDURE — 73040 CONTRAST X-RAY OF SHOULDER: CPT

## 2024-10-22 PROCEDURE — A9575 INJ GADOTERATE MEGLUMI 0.1ML: HCPCS | Performed by: STUDENT IN AN ORGANIZED HEALTH CARE EDUCATION/TRAINING PROGRAM

## 2024-10-22 PROCEDURE — 73222 MRI JOINT UPR EXTREM W/DYE: CPT

## 2024-10-22 PROCEDURE — 6360000004 HC RX CONTRAST MEDICATION: Performed by: STUDENT IN AN ORGANIZED HEALTH CARE EDUCATION/TRAINING PROGRAM

## 2024-10-22 PROCEDURE — 23350 INJECTION FOR SHOULDER X-RAY: CPT

## 2024-10-22 PROCEDURE — 2500000003 HC RX 250 WO HCPCS: Performed by: STUDENT IN AN ORGANIZED HEALTH CARE EDUCATION/TRAINING PROGRAM

## 2024-10-22 RX ORDER — LIDOCAINE HYDROCHLORIDE 10 MG/ML
10 INJECTION, SOLUTION EPIDURAL; INFILTRATION; INTRACAUDAL; PERINEURAL ONCE
Status: COMPLETED | OUTPATIENT
Start: 2024-10-22 | End: 2024-10-22

## 2024-10-22 RX ORDER — GADOTERATE MEGLUMINE 376.9 MG/ML
0.1 INJECTION INTRAVENOUS ONCE
Status: COMPLETED | OUTPATIENT
Start: 2024-10-22 | End: 2024-10-22

## 2024-10-22 RX ADMIN — GADOTERATE MEGLUMINE 0.1 ML: 376.9 INJECTION, SOLUTION INTRAVENOUS at 15:01

## 2024-10-22 RX ADMIN — IOHEXOL 10 ML: 180 INJECTION INTRAVENOUS at 15:01

## 2024-10-22 RX ADMIN — LIDOCAINE HYDROCHLORIDE 10 ML: 10 INJECTION, SOLUTION EPIDURAL; INFILTRATION; INTRACAUDAL; PERINEURAL at 15:00

## 2024-10-25 ENCOUNTER — PATIENT MESSAGE (OUTPATIENT)
Facility: CLINIC | Age: 74
End: 2024-10-25

## 2024-10-26 DIAGNOSIS — E55.9 VITAMIN D DEFICIENCY, UNSPECIFIED: ICD-10-CM

## 2024-10-29 ENCOUNTER — CLINICAL DOCUMENTATION (OUTPATIENT)
Age: 74
End: 2024-10-29

## 2024-10-29 NOTE — PROGRESS NOTES
Received VO from Earlene Brown NP:  Pt Moderate cardiac risk, may proceed with procedure.(Per Revised Cardiac Risk Index (Ashok Criteria) )   re: holding anticoagulation for procedure.     On plavix for hx of TIA - prescribed by PCP.   If plavix needs to be held - can hold 5 days prior to procedure.   Restart anticoagulation per surgeon, as soon as possible.       Risk stratification and Medication hold [facility] @ [phone], arthroscopic shoulder surgery

## 2024-11-01 ENCOUNTER — OFFICE VISIT (OUTPATIENT)
Facility: CLINIC | Age: 74
End: 2024-11-01

## 2024-11-01 VITALS
TEMPERATURE: 97.1 F | OXYGEN SATURATION: 96 % | SYSTOLIC BLOOD PRESSURE: 128 MMHG | HEART RATE: 68 BPM | BODY MASS INDEX: 30.46 KG/M2 | DIASTOLIC BLOOD PRESSURE: 62 MMHG | HEIGHT: 68 IN | RESPIRATION RATE: 16 BRPM | WEIGHT: 201 LBS

## 2024-11-01 DIAGNOSIS — Z01.818 PRE-OP EVALUATION: Primary | ICD-10-CM

## 2024-11-01 DIAGNOSIS — Z87.898 HISTORY OF PREDIABETES: ICD-10-CM

## 2024-11-01 RX ORDER — METRONIDAZOLE 7.5 MG/G
GEL TOPICAL 2 TIMES DAILY
COMMUNITY

## 2024-11-01 SDOH — ECONOMIC STABILITY: FOOD INSECURITY: WITHIN THE PAST 12 MONTHS, YOU WORRIED THAT YOUR FOOD WOULD RUN OUT BEFORE YOU GOT MONEY TO BUY MORE.: NEVER TRUE

## 2024-11-01 SDOH — ECONOMIC STABILITY: INCOME INSECURITY: HOW HARD IS IT FOR YOU TO PAY FOR THE VERY BASICS LIKE FOOD, HOUSING, MEDICAL CARE, AND HEATING?: NOT HARD AT ALL

## 2024-11-01 SDOH — ECONOMIC STABILITY: FOOD INSECURITY: WITHIN THE PAST 12 MONTHS, THE FOOD YOU BOUGHT JUST DIDN'T LAST AND YOU DIDN'T HAVE MONEY TO GET MORE.: NEVER TRUE

## 2024-11-01 ASSESSMENT — ENCOUNTER SYMPTOMS
SHORTNESS OF BREATH: 0
ABDOMINAL PAIN: 0
CHEST TIGHTNESS: 0

## 2024-11-01 NOTE — PROGRESS NOTES
Chief Complaint   Patient presents with    Pre-op Exam     L shoulder surgery-- seen cardiologist. We manage plavix but cardiologist did state in their note to stop plavix 5 days before surgery.Just need health and physical from us. 11/22 Left rotator cuff surgery     No data recorded  \"Have you been to the ER, urgent care clinic since your last visit?  Hospitalized since your last visit?\"    NO    “Have you seen or consulted any other health care providers outside our system since your last visit?”    NO    “Have you had a colorectal cancer screening such as a colonoscopy/FIT/Cologuard?    NO    Date of last Colonoscopy: 7/13/2018  No cologuard on file  No FIT/FOBT on file   No flexible sigmoidoscopy on file       Click Here for Release of Records Request     Resp 16   Ht 1.727 m (5' 8\")   Wt 91.2 kg (201 lb)   BMI 30.56 kg/m²       12/18/2023     4:30 PM   Amb Fall Risk Assessment and TUG Test   Do you feel unsteady or are you worried about falling?  no   2 or more falls in past year? no   Fall with injury in past year? no

## 2024-11-01 NOTE — PROGRESS NOTES
Pre-Operative History and Physical    Patient is a 74 y.o.  y.o. male  who presents for a preoperative evaluation.    Procedure: Left Rotator Cuff Repair  Surgeon: Dr. Kevan Parker  Date of procedure: 11/26/2024  History of complications to anesthesia: None  History of allergy to latex: Denies  Anesthesia Type: General Anesthesia versus MAC, likely nerve block    Allergies: Imiquimod (rash)    Past Medical History:   Diagnosis Date    BPH (benign prostatic hyperplasia)     History of prediabetes     HLD (hyperlipidemia)     HTN (hypertension)     Hypothyroidism     Multilevel degenerative disc disease     LISBETH (obstructive sleep apnea)     Raynaud's phenomenon     TIA (transient ischemic attack)       Past Surgical History:   Procedure Laterality Date    JOINT REPLACEMENT Bilateral     Left appr. 2015, Right appr. 2016    TURP  11/27/2023        For general medical history patient reports taking all medications daily as directed.   Current Outpatient Medications   Medication Instructions    atorvastatin (LIPITOR) 20 mg, Oral, DAILY    benazepril-hydrochlorthiazide (LOTENSIN HCT) 10-12.5 MG per tablet TAKE ONE TABLET BY MOUTH ONE TIME DAILY FOR BLOOD PRESSURE    clopidogrel (PLAVIX) 75 mg, Oral, DAILY    latanoprost (XALATAN) 0.005 % ophthalmic solution 1 drop, Ophthalmic    levothyroxine (SYNTHROID) 100 mcg, Oral, DAILY    tadalafil (CIALIS) 5 mg, Oral, DAILY    testosterone cypionate (DEPOTESTOTERONE CYPIONATE) 200 MG/ML injection INJECT 1 ML INTRAMUSCULARLY EVERY 3 WEEKS    vitamin D (CHOLECALCIFEROL) 5,000 Units, Oral, DAILY        Vitals:    11/01/24 0917   BP: 128/62   Pulse: 68   Resp: 16   Temp: 97.1 °F (36.2 °C)   SpO2: 96%            Review of Systems   Review of Systems   Constitutional:  Negative for fatigue and fever.   Respiratory:  Negative for chest tightness and shortness of breath.    Cardiovascular:  Negative for chest pain, palpitations and leg swelling.   Gastrointestinal:  Negative for

## 2024-11-02 LAB
ALBUMIN SERPL-MCNC: 4.4 G/DL (ref 3.8–4.8)
ALP SERPL-CCNC: 87 IU/L (ref 44–121)
ALT SERPL-CCNC: 16 IU/L (ref 0–44)
AST SERPL-CCNC: 19 IU/L (ref 0–40)
BASOPHILS # BLD AUTO: 0 X10E3/UL (ref 0–0.2)
BASOPHILS NFR BLD AUTO: 0 %
BILIRUB SERPL-MCNC: 0.6 MG/DL (ref 0–1.2)
BUN SERPL-MCNC: 20 MG/DL (ref 8–27)
BUN/CREAT SERPL: 20 (ref 10–24)
CALCIUM SERPL-MCNC: 9.5 MG/DL (ref 8.6–10.2)
CHLORIDE SERPL-SCNC: 106 MMOL/L (ref 96–106)
CO2 SERPL-SCNC: 21 MMOL/L (ref 20–29)
CREAT SERPL-MCNC: 1 MG/DL (ref 0.76–1.27)
EGFRCR SERPLBLD CKD-EPI 2021: 79 ML/MIN/1.73
EOSINOPHIL # BLD AUTO: 0.2 X10E3/UL (ref 0–0.4)
EOSINOPHIL NFR BLD AUTO: 2 %
ERYTHROCYTE [DISTWIDTH] IN BLOOD BY AUTOMATED COUNT: 12 % (ref 11.6–15.4)
GLOBULIN SER CALC-MCNC: 2.4 G/DL (ref 1.5–4.5)
GLUCOSE SERPL-MCNC: 89 MG/DL (ref 70–99)
HBA1C MFR BLD: 5.5 % (ref 4.8–5.6)
HCT VFR BLD AUTO: 45.3 % (ref 37.5–51)
HGB BLD-MCNC: 14.5 G/DL (ref 13–17.7)
IMM GRANULOCYTES # BLD AUTO: 0 X10E3/UL (ref 0–0.1)
IMM GRANULOCYTES NFR BLD AUTO: 0 %
LYMPHOCYTES # BLD AUTO: 1.7 X10E3/UL (ref 0.7–3.1)
LYMPHOCYTES NFR BLD AUTO: 21 %
MCH RBC QN AUTO: 32.1 PG (ref 26.6–33)
MCHC RBC AUTO-ENTMCNC: 32 G/DL (ref 31.5–35.7)
MCV RBC AUTO: 100 FL (ref 79–97)
MONOCYTES # BLD AUTO: 0.7 X10E3/UL (ref 0.1–0.9)
MONOCYTES NFR BLD AUTO: 9 %
NEUTROPHILS # BLD AUTO: 5.6 X10E3/UL (ref 1.4–7)
NEUTROPHILS NFR BLD AUTO: 68 %
PLATELET # BLD AUTO: 262 X10E3/UL (ref 150–450)
POTASSIUM SERPL-SCNC: 4.6 MMOL/L (ref 3.5–5.2)
PROT SERPL-MCNC: 6.8 G/DL (ref 6–8.5)
RBC # BLD AUTO: 4.52 X10E6/UL (ref 4.14–5.8)
SODIUM SERPL-SCNC: 143 MMOL/L (ref 134–144)
WBC # BLD AUTO: 8.3 X10E3/UL (ref 3.4–10.8)

## 2024-11-04 ENCOUNTER — TELEPHONE (OUTPATIENT)
Age: 74
End: 2024-11-04

## 2024-11-04 NOTE — TELEPHONE ENCOUNTER
Patient is calling because he needs cardiac clearance to have left shoulder surgery.Patient is schedule to have surgery on 11/26/24.    Patient said he had to have clearance because his PCP did an EKG on him and it showed some changes from comparison to his last one.Patient believes he needs an appointment to come into the office.      [facility]  Dr.Paul Portillo   [phone] fax    [phone] patient

## 2024-11-04 NOTE — TELEPHONE ENCOUNTER
Patient is calling because he needs cardiac clearance to have left shoulder surgery.Patient is schedule to have surgery on 24.    Patient said he had to have clearance because his PCP did an EKG on him and it showed some changes from comparison to his last one.Patient believes he needs an appointment to come into the office.      [facility]  Dr.Paul Portillo   [phone] fax    [phone] patient     ################################    Spoke with the pt, identified the pt with name and . Pt stated that his surgeon is insisting a follow up with cardiology prior to surgery d/t EKG changes according to PCP. I explained the on only provider I can get him into see prior to his surgery date is Zulema Banks [place]. The pt was agreeable. I scheduled him for  @ 1340.    I will route call to Zulema with explanation.

## 2024-11-08 ENCOUNTER — TELEPHONE (OUTPATIENT)
Facility: CLINIC | Age: 74
End: 2024-11-08

## 2024-11-08 NOTE — TELEPHONE ENCOUNTER
[facility] called requesting that EKG that was completed at appointment on 11.01.2024 be uploaded so they can compare to one that will be completed at their office on 11.11.2024

## 2024-11-21 ENCOUNTER — OFFICE VISIT (OUTPATIENT)
Age: 74
End: 2024-11-21

## 2024-11-21 VITALS
WEIGHT: 201 LBS | HEART RATE: 62 BPM | OXYGEN SATURATION: 98 % | HEIGHT: 68 IN | SYSTOLIC BLOOD PRESSURE: 130 MMHG | DIASTOLIC BLOOD PRESSURE: 68 MMHG | BODY MASS INDEX: 30.46 KG/M2

## 2024-11-21 DIAGNOSIS — G47.33 OSA ON CPAP: ICD-10-CM

## 2024-11-21 DIAGNOSIS — E78.5 DYSLIPIDEMIA: ICD-10-CM

## 2024-11-21 DIAGNOSIS — I10 ESSENTIAL (PRIMARY) HYPERTENSION: ICD-10-CM

## 2024-11-21 DIAGNOSIS — R94.31 ABNORMAL ECG: Primary | ICD-10-CM

## 2024-11-21 DIAGNOSIS — Z86.73 HISTORY OF TIA (TRANSIENT ISCHEMIC ATTACK): ICD-10-CM

## 2024-11-21 RX ORDER — DOXYCYCLINE 100 MG/1
100 CAPSULE ORAL DAILY
COMMUNITY
Start: 2024-11-19

## 2024-11-21 ASSESSMENT — PATIENT HEALTH QUESTIONNAIRE - PHQ9
SUM OF ALL RESPONSES TO PHQ9 QUESTIONS 1 & 2: 0
SUM OF ALL RESPONSES TO PHQ QUESTIONS 1-9: 0
1. LITTLE INTEREST OR PLEASURE IN DOING THINGS: NOT AT ALL
2. FEELING DOWN, DEPRESSED OR HOPELESS: NOT AT ALL

## 2024-11-21 NOTE — PROGRESS NOTES
ICD-10-CM    1. Abnormal ECG  R94.31 Nuclear stress test with myocardial perfusion      2. Essential (primary) hypertension  I10 EKG 12 Lead     Nuclear stress test with myocardial perfusion      3. Dyslipidemia  E78.5 Nuclear stress test with myocardial perfusion      4. LISBETH on CPAP  G47.33       5. History of TIA (transient ischemic attack)  Z86.73             ASSESSMENT/RECOMMENDATIONS:.        1. HTN  - continue benazepril-HCTZ 10-12.5 mg daily   - echo 7/2021 with EF 60-65%  - BP well controlled      2. Dyslipidemia  - continue lipitor 20 mg daily   Lab Results   Component Value Date    LDL 60 09/09/2024      3. LISBETH on CPAP  - compliant with CPAP        4. History of TIA   - continue plavix 75 mg daily and statin  - carotid artery doppler - 8/8/23 - Mild (<50%) stenosis in the right and left  internal carotid artery    5. Lymphedema   - he wears compression stockings and is followed in lymphedema clinic every 6 months     6.  Abnormal ECG/pre-operative evaluation   -New Q waves in anteroseptal leads on ECG 11/1/24 may indicate hx of silent MI  -Coupled with c/o chest pain will proceed with exercise nuclear stress test to assess for ischemia   -If stress test does not show ischemia will give him cardiac clearance for surgery    Zulema Banks, ACNP, [facility]     [address], [address]  [place] [address]  (O) [phone] (Fax) [phone]     [address], [address]  [place] [address]  (O) [phone]

## 2024-11-21 NOTE — PATIENT INSTRUCTIONS
You will be scheduled for a Nuclear Stress Test after your appointment today.    Nuclear stress testing evaluates blood flow to your heart muscle and assesses cardiac function. There are 2 parts (Rest/Stress) to this procedure and will include either an exercise on a treadmill or an IV administration of a stressing medication called Lexiscan. Your cardiologist will determine which type of testing is best for you. This test can be performed in one day unless it is determined that better quality images will be obtained by performing the test over two days.     *Please arrive 15 minutes prior to your appointment time    Test Duration:    -One day testing will take 4 hours     Day of testing instructions:    NO CAFFEINE (not even decaffeinated products) 24 HOURS PRIOR TO TESTING. This includes coffee, soda, tea, chocolate, multivitamins, and migraine medication, like Excedrin or Fioricet that contains caffeine.  Nothing to eat or drink 4 HOURS prior to testing  NO NICOTINE 12 hours prior to testing  Take medications as directed with a few sips of water.   Wear comfortable clothes and shoes (Shirts with no metal, shorts or pants, tennis shoes, no heels or flip flops)    IMPORTANT: This testing involves a cardiac tracer ordered specifically for you. If you are unable to make your appointment, please call to cancel/reschedule AT LEAST 24 hours prior to your appointment so your tracer can be cancelled. [phone].    Please notify surgeon that your surgery will need to be postponed until at least one week after your stress test

## 2024-12-01 PROBLEM — Z01.818 PRE-OP EVALUATION: Status: RESOLVED | Noted: 2024-11-01 | Resolved: 2024-12-01

## 2024-12-04 ENCOUNTER — ANCILLARY PROCEDURE (OUTPATIENT)
Age: 74
End: 2024-12-04
Payer: MEDICARE

## 2024-12-04 VITALS — BODY MASS INDEX: 30.46 KG/M2 | WEIGHT: 201 LBS | HEIGHT: 68 IN

## 2024-12-04 DIAGNOSIS — I10 ESSENTIAL (PRIMARY) HYPERTENSION: ICD-10-CM

## 2024-12-04 DIAGNOSIS — R94.31 ABNORMAL ECG: ICD-10-CM

## 2024-12-04 DIAGNOSIS — E78.5 DYSLIPIDEMIA: ICD-10-CM

## 2024-12-04 PROCEDURE — A9500 TC99M SESTAMIBI: HCPCS | Performed by: INTERNAL MEDICINE

## 2024-12-04 PROCEDURE — 78452 HT MUSCLE IMAGE SPECT MULT: CPT | Performed by: INTERNAL MEDICINE

## 2024-12-04 RX ORDER — TETRAKIS(2-METHOXYISOBUTYLISOCYANIDE)COPPER(I) TETRAFLUOROBORATE 1 MG/ML
24.3 INJECTION, POWDER, LYOPHILIZED, FOR SOLUTION INTRAVENOUS
Status: COMPLETED | OUTPATIENT
Start: 2024-12-04 | End: 2024-12-04

## 2024-12-04 RX ORDER — TETRAKIS(2-METHOXYISOBUTYLISOCYANIDE)COPPER(I) TETRAFLUOROBORATE 1 MG/ML
8.4 INJECTION, POWDER, LYOPHILIZED, FOR SOLUTION INTRAVENOUS
Status: COMPLETED | OUTPATIENT
Start: 2024-12-04 | End: 2024-12-04

## 2024-12-04 RX ADMIN — TECHNETIUM TC 99M SESTAMIBI 24.3 MILLICURIE: 1 INJECTION, POWDER, FOR SOLUTION INTRAVENOUS at 14:15

## 2024-12-04 RX ADMIN — TECHNETIUM TC 99M SESTAMIBI 8.4 MILLICURIE: 1 INJECTION, POWDER, FOR SOLUTION INTRAVENOUS at 12:45

## 2024-12-06 DIAGNOSIS — L71.9 ROSACEA: Primary | ICD-10-CM

## 2024-12-06 LAB
ECHO BSA: 2.09 M2
NUC STRESS EJECTION FRACTION: 64 %
STRESS ANGINA INDEX: 0
STRESS BASELINE DIAS BP: 64 MMHG
STRESS BASELINE HR: 61 BPM
STRESS BASELINE ST DEPRESSION: 0 MM
STRESS BASELINE SYS BP: 124 MMHG
STRESS ESTIMATED WORKLOAD: 8.7 METS
STRESS EXERCISE DUR MIN: 7 MIN
STRESS EXERCISE DUR SEC: 15 SEC
STRESS O2 SAT PEAK: 100 %
STRESS O2 SAT REST: 100 %
STRESS PEAK DIAS BP: 66 MMHG
STRESS PEAK SYS BP: 188 MMHG
STRESS PERCENT HR ACHIEVED: 90 %
STRESS POST PEAK HR: 133 BPM
STRESS RATE PRESSURE PRODUCT: NORMAL BPM*MMHG
STRESS SR DUKE TREADMILL SCORE: 7
STRESS ST DEPRESSION: 0 MM
STRESS TARGET HR: 147 BPM
TID: 0.99

## 2024-12-06 PROCEDURE — PBSHW PBB SHADOW CHARGE: Performed by: INTERNAL MEDICINE

## 2024-12-06 PROCEDURE — 93018 CV STRESS TEST I&R ONLY: CPT | Performed by: INTERNAL MEDICINE

## 2024-12-06 PROCEDURE — 93016 CV STRESS TEST SUPVJ ONLY: CPT | Performed by: INTERNAL MEDICINE

## 2024-12-06 PROCEDURE — 78452 HT MUSCLE IMAGE SPECT MULT: CPT | Performed by: INTERNAL MEDICINE

## 2024-12-06 NOTE — TELEPHONE ENCOUNTER
Patient called in requesting a refill on his Metronidazole (Metrogel) 0.75%. He would like for it to be sent to [facility] on [address]. He said he spoke with NP ANTONIO about this at his last appt with her.

## 2024-12-09 ENCOUNTER — TELEPHONE (OUTPATIENT)
Age: 74
End: 2024-12-09

## 2024-12-09 NOTE — TELEPHONE ENCOUNTER
----- Message from POOJA Reynolds NP sent at 12/9/2024 10:45 AM EST -----  Nguyen patient but I don't know who his nurse is    Please let him know that his stress test was normal and did not show any evidence of a previous heart attack.  No concern regarding ECG changes now.  He is ok to proceed with surgery.  Please do cardiac clearance letter which states \"Patient is ok to proceed with planned surgery from a cardiac standpoint.  He is low risk for cardiovascular complications during a non-cardiac surgery. He is on clopidogrel prescribe by internal medicine physician due to history of TIA so decision regarding pre-operative clopidogrel management should be addressed with Dr. Oliva.\"

## 2024-12-09 NOTE — TELEPHONE ENCOUNTER
Telephone call made to patient. Two patient identifiers verified.   Went over results with patient. Verified understanding. All questions answered.     Clearance faxed to Dr. Bragg' office with confirmation    Future Appointments   Date Time Provider Department Center   2/26/2025  2:00 PM Leslie Billings APRN - CNP [facility] DEP   4/15/2025 10:45 AM [facility]   9/18/2025 10:00 AM Sean Cedillo MD [facility]

## 2024-12-12 RX ORDER — METRONIDAZOLE 7.5 MG/G
GEL TOPICAL 2 TIMES DAILY
OUTPATIENT
Start: 2024-12-12

## 2024-12-13 ENCOUNTER — OFFICE VISIT (OUTPATIENT)
Age: 74
End: 2024-12-13

## 2024-12-13 VITALS
HEART RATE: 63 BPM | RESPIRATION RATE: 16 BRPM | OXYGEN SATURATION: 99 % | HEIGHT: 68 IN | TEMPERATURE: 98.8 F | DIASTOLIC BLOOD PRESSURE: 61 MMHG | SYSTOLIC BLOOD PRESSURE: 117 MMHG | WEIGHT: 206.6 LBS | BODY MASS INDEX: 31.31 KG/M2

## 2024-12-13 DIAGNOSIS — L03.116 CELLULITIS OF LEFT LOWER EXTREMITY: Primary | ICD-10-CM

## 2024-12-13 RX ORDER — METRONIDAZOLE 7.5 MG/G
GEL TOPICAL 2 TIMES DAILY
Qty: 45 G | Refills: 5 | Status: SHIPPED | OUTPATIENT
Start: 2024-12-13

## 2024-12-13 RX ORDER — CEPHALEXIN 500 MG/1
500 CAPSULE ORAL 4 TIMES DAILY
Qty: 28 CAPSULE | Refills: 0 | Status: SHIPPED | OUTPATIENT
Start: 2024-12-13 | End: 2024-12-20

## 2024-12-13 NOTE — PATIENT INSTRUCTIONS
Left lower extremity pain and swelling concerning for cellulitis -  Start Keflex, 4 times daily for 7 days  Recommend follow-up with your primary care provider in the next 2 to 3 days  Avoid NSAIDs as directed by your cardiologist  Elevate foot at home  Okay to take acetaminophen as needed for pain  If any worsening go to the nearest emergency room  Call or return to clinic if any concerns

## 2024-12-13 NOTE — PROGRESS NOTES
Claudio Salas (:  1950) is a 74 y.o. male,Established patient, here for evaluation of the following chief complaint(s):  Gout (Pt c/o gout , sx started Monday, pt states it is his left foot. Pt states he had a stress test last week on the treadmill and sx started shortly after that. Pt states he also had a colonoscopy done and the medication stated that it could cause gout. )        SUBJECTIVE/OBJECTIVE:    History provided by:  Patient  Gout       74 y.o. male with past medical history hypertension, gout, and TIA on Plavix presents with symptoms of left foot pain and swelling that started 8 days ago.  States had a stress test last Wednesday, and then the pain started Thursday (8 days ago) along with swelling, pain is from his ankle all the way down to his midfoot.  No numbness but states if he hangs his foot over the bed he will have tingling.  It is painful to walk on.  States had chills yesterday but he was also outside eating ice cream with his son.  He denies any fevers.  He has a history of gout.    Cannot take NSAIDs (on Plavix).    States he is on a daily doxycycline after a cystoscopy he had about a month ago, he only has a few days left of that prescription.        Vitals:    24 1128   BP: 117/61   Site: Left Upper Arm   Position: Sitting   Cuff Size: Medium Adult   Pulse: 63   Resp: 16   Temp: 98.8 °F (37.1 °C)   TempSrc: Oral   SpO2: 99%   Weight: 93.7 kg (206 lb 9.6 oz)   Height: 1.727 m (5' 8\")       No results found for this visit on 24.     Physical Exam  Constitutional:       General: He is not in acute distress.     Appearance: Normal appearance. He is not ill-appearing or toxic-appearing.   HENT:      Head: Normocephalic and atraumatic.   Cardiovascular:      Rate and Rhythm: Normal rate and regular rhythm.      Heart sounds: Normal heart sounds. No murmur heard.     No friction rub. No gallop.   Pulmonary:      Effort: Pulmonary effort is normal. No respiratory distress.

## 2024-12-18 DIAGNOSIS — E03.9 ACQUIRED HYPOTHYROIDISM: ICD-10-CM

## 2024-12-18 DIAGNOSIS — G45.9 TIA (TRANSIENT ISCHEMIC ATTACK): ICD-10-CM

## 2024-12-18 DIAGNOSIS — E78.2 MIXED HYPERLIPIDEMIA: ICD-10-CM

## 2024-12-18 DIAGNOSIS — I10 ESSENTIAL HYPERTENSION: ICD-10-CM

## 2024-12-20 ENCOUNTER — CLINICAL DOCUMENTATION (OUTPATIENT)
Age: 74
End: 2024-12-20

## 2024-12-20 ENCOUNTER — OFFICE VISIT (OUTPATIENT)
Facility: CLINIC | Age: 74
End: 2024-12-20

## 2024-12-20 VITALS
TEMPERATURE: 97.2 F | OXYGEN SATURATION: 98 % | RESPIRATION RATE: 16 BRPM | HEIGHT: 68 IN | WEIGHT: 204 LBS | SYSTOLIC BLOOD PRESSURE: 130 MMHG | BODY MASS INDEX: 30.92 KG/M2 | HEART RATE: 84 BPM | DIASTOLIC BLOOD PRESSURE: 60 MMHG

## 2024-12-20 DIAGNOSIS — Z87.39 HISTORY OF GOUT: ICD-10-CM

## 2024-12-20 DIAGNOSIS — Z86.73 HISTORY OF TIA (TRANSIENT ISCHEMIC ATTACK): ICD-10-CM

## 2024-12-20 DIAGNOSIS — L03.116 CELLULITIS OF LEFT LOWER EXTREMITY: Primary | ICD-10-CM

## 2024-12-20 PROBLEM — Z79.01 CHRONIC ANTICOAGULATION: Status: ACTIVE | Noted: 2024-12-20

## 2024-12-20 PROBLEM — Z79.01 CHRONIC ANTICOAGULATION: Status: RESOLVED | Noted: 2024-12-20 | Resolved: 2024-12-20

## 2024-12-20 NOTE — PROGRESS NOTES
Risk stratification and Medication hold faxed to [facility] @ [phone], arthroscopic shoulder surgery

## 2024-12-20 NOTE — ASSESSMENT & PLAN NOTE
-Reviewed with patient instructions for medications prior to upcoming rotator cuff repair with Dr. Parker.   -Advised patient to hold his Plavix five days prior to surgery.

## 2024-12-20 NOTE — PROGRESS NOTES
Chief Complaint   Patient presents with    ED Follow-up     [facility] 12/13-Cellulitis of left lower extremity-- until wed was having to use cane but swelling has gone down still little painful in ankle joint-- antibiotic last dose today L ankle    Letter     Just need to know about stopping plavix     No data recorded  \"Have you been to the ER, urgent care clinic since your last visit?  Hospitalized since your last visit?\"    NO    “Have you seen or consulted any other health care providers outside our system since your last visit?”    NO    “Have you had a colorectal cancer screening such as a colonoscopy/FIT/Cologuard?    NO    Date of last Colonoscopy: 7/13/2018  No cologuard on file  No FIT/FOBT on file   No flexible sigmoidoscopy on file       Click Here for Release of Records Request     Pulse 84   Temp 97.2 °F (36.2 °C) (Temporal)   Resp 16   Ht 1.727 m (5' 8\")   Wt 92.5 kg (204 lb)   SpO2 98%   BMI 31.02 kg/m²       11/21/2024     1:25 PM   Amb Fall Risk Assessment and TUG Test   Do you feel unsteady or are you worried about falling?  no   2 or more falls in past year? no   Fall with injury in past year? yes

## 2024-12-20 NOTE — PROGRESS NOTES
Acute Office Visit    Subjective  Chief Complaint   Patient presents with    ED Follow-up     [facility] 12/13-Cellulitis of left lower extremity-- until wed was having to use cane but swelling has gone down still little painful in ankle joint-- antibiotic last dose today L ankle    Letter     Just need to know about stopping plavix     HPI:  Claudio Salas is a 74 y.o. male. He presents for an ED follow up.     1. Cellulitis of left lower extremity  2. History of gout  Patient was seen in an Urgent Care on 12/13/24 for swelling and pain to left lower extremity. He was diagnosed with cellulitis and discharged with Keflex, he states he has a couple of days left. Was prior recently on Doxycycline for Urology procedure and will likely have prophylactic treatment with Omnicef for upcoming rotator cuff repair. Patient has been sleeping with leg propped up since Urgent Care visit. He states that the issue has mostly been swelling and pain, hasn't gotten terribly red. He states the pain has gone down to a 4/10. Slight redness still and continued edema though markedly improved according to Mr. Salas. He states that he does have a history of lymphedema to both legs. He has gone to the lymphedema clinic at [place], does compression stockings at home. He sees them every six months.     3. History of TIA (transient ischemic attack)  Patient is in the process of getting rescheduled for his left rotator cuff repair with Dr. Parker which was delayed because of EKG changes. Patient is in the process of being cleared by cardiology and rescheduling this surgery. He is requesting that guidance be faxed to Dr. Parker's office on holding Plavix prior to surgery.     Past Medical History:   Diagnosis Date    BPH (benign prostatic hyperplasia)     History of prediabetes     HLD (hyperlipidemia)     HTN (hypertension)     Hypothyroidism     Multilevel degenerative disc disease     LISBETH (obstructive sleep apnea)     Raynaud's phenomenon

## 2024-12-20 NOTE — ASSESSMENT & PLAN NOTE
-Discussed with patient r/t past, present, and future antibiotic use that though still mildly red and swollen, we may trial ending antibiotics as prescribed.  -Patient educated on warning signs to return to Urgent Care or ER  -Discussed continuing compression stockings and elevating legs for edema to resolve.

## 2024-12-21 LAB
ALBUMIN SERPL-MCNC: 4.3 G/DL (ref 3.8–4.8)
ALP SERPL-CCNC: 85 IU/L (ref 44–121)
ALT SERPL-CCNC: 27 IU/L (ref 0–44)
AST SERPL-CCNC: 32 IU/L (ref 0–40)
BASOPHILS # BLD AUTO: 0 X10E3/UL (ref 0–0.2)
BASOPHILS NFR BLD AUTO: 1 %
BILIRUB SERPL-MCNC: 0.5 MG/DL (ref 0–1.2)
BUN SERPL-MCNC: 20 MG/DL (ref 8–27)
BUN/CREAT SERPL: 21 (ref 10–24)
CALCIUM SERPL-MCNC: 9.4 MG/DL (ref 8.6–10.2)
CHLORIDE SERPL-SCNC: 104 MMOL/L (ref 96–106)
CO2 SERPL-SCNC: 23 MMOL/L (ref 20–29)
CREAT SERPL-MCNC: 0.96 MG/DL (ref 0.76–1.27)
EGFRCR SERPLBLD CKD-EPI 2021: 83 ML/MIN/1.73
EOSINOPHIL # BLD AUTO: 0.1 X10E3/UL (ref 0–0.4)
EOSINOPHIL NFR BLD AUTO: 3 %
ERYTHROCYTE [DISTWIDTH] IN BLOOD BY AUTOMATED COUNT: 11.4 % (ref 11.6–15.4)
GLOBULIN SER CALC-MCNC: 2.3 G/DL (ref 1.5–4.5)
GLUCOSE SERPL-MCNC: 77 MG/DL (ref 70–99)
HCT VFR BLD AUTO: 45 % (ref 37.5–51)
HGB BLD-MCNC: 14.3 G/DL (ref 13–17.7)
IMM GRANULOCYTES # BLD AUTO: 0 X10E3/UL (ref 0–0.1)
IMM GRANULOCYTES NFR BLD AUTO: 1 %
LYMPHOCYTES # BLD AUTO: 1.3 X10E3/UL (ref 0.7–3.1)
LYMPHOCYTES NFR BLD AUTO: 26 %
MCH RBC QN AUTO: 31.8 PG (ref 26.6–33)
MCHC RBC AUTO-ENTMCNC: 31.8 G/DL (ref 31.5–35.7)
MCV RBC AUTO: 100 FL (ref 79–97)
MONOCYTES # BLD AUTO: 0.6 X10E3/UL (ref 0.1–0.9)
MONOCYTES NFR BLD AUTO: 12 %
NEUTROPHILS # BLD AUTO: 2.8 X10E3/UL (ref 1.4–7)
NEUTROPHILS NFR BLD AUTO: 57 %
PLATELET # BLD AUTO: 274 X10E3/UL (ref 150–450)
POTASSIUM SERPL-SCNC: 5 MMOL/L (ref 3.5–5.2)
PROT SERPL-MCNC: 6.6 G/DL (ref 6–8.5)
RBC # BLD AUTO: 4.49 X10E6/UL (ref 4.14–5.8)
SODIUM SERPL-SCNC: 141 MMOL/L (ref 134–144)
URATE SERPL-MCNC: 7.3 MG/DL (ref 3.8–8.4)
WBC # BLD AUTO: 4.8 X10E3/UL (ref 3.4–10.8)

## 2024-12-22 RX ORDER — LEVOTHYROXINE SODIUM 100 UG/1
100 TABLET ORAL DAILY
Qty: 90 TABLET | Refills: 1 | Status: SHIPPED | OUTPATIENT
Start: 2024-12-22

## 2024-12-22 RX ORDER — CLOPIDOGREL BISULFATE 75 MG/1
75 TABLET ORAL DAILY
Qty: 90 TABLET | Refills: 1 | Status: SHIPPED | OUTPATIENT
Start: 2024-12-22

## 2024-12-22 RX ORDER — ATORVASTATIN CALCIUM 20 MG/1
20 TABLET, FILM COATED ORAL DAILY
Qty: 90 TABLET | Refills: 1 | Status: SHIPPED | OUTPATIENT
Start: 2024-12-22

## 2024-12-22 RX ORDER — BENAZEPRIL HYDROCHLORIDE AND HYDROCHLOROTHIAZIDE 10; 12.5 MG/1; MG/1
TABLET ORAL
Qty: 90 TABLET | Refills: 1 | Status: SHIPPED | OUTPATIENT
Start: 2024-12-22

## 2025-01-20 ENCOUNTER — TELEPHONE (OUTPATIENT)
Age: 75
End: 2025-01-20

## 2025-01-20 NOTE — TELEPHONE ENCOUNTER
Lloyd from  office called in stating that they are needing an updated cardiac clearance .       FAX # [phone]

## 2025-01-20 NOTE — TELEPHONE ENCOUNTER
Telephone call made to Dr. Parker' office. Two patient identifiers verified. They need the cardiac clearance re-printed and faxed. They need a clearance that was printed within 30 days of procedure. See communications. Left clearance on Zulema's desk to sign.

## 2025-01-22 ENCOUNTER — CLINICAL DOCUMENTATION (OUTPATIENT)
Age: 75
End: 2025-01-22

## 2025-01-22 NOTE — PROGRESS NOTES
Received VO from TAMIR Ch:    Patient is ok to proceed with planned surgery from a cardiac standpoint.  He is low risk for cardiovascular complications during a non-cardiac surgery. He is on clopidogrel prescribe by internal medicine physician due to history of TIA so decision regarding pre-operative clopidogrel management should be addressed with Dr. Oliva.     Risk stratification and Med[facility] @ [phone], total shoulder replacement

## 2025-02-09 ENCOUNTER — OFFICE VISIT (OUTPATIENT)
Age: 75
End: 2025-02-09

## 2025-02-09 VITALS
HEIGHT: 68 IN | HEART RATE: 70 BPM | OXYGEN SATURATION: 98 % | TEMPERATURE: 99.4 F | BODY MASS INDEX: 31.67 KG/M2 | WEIGHT: 209 LBS | DIASTOLIC BLOOD PRESSURE: 64 MMHG | SYSTOLIC BLOOD PRESSURE: 134 MMHG

## 2025-02-09 DIAGNOSIS — R06.2 WHEEZES: ICD-10-CM

## 2025-02-09 DIAGNOSIS — R09.89 CHEST CONGESTION: Primary | ICD-10-CM

## 2025-02-09 DIAGNOSIS — R68.89 FLU-LIKE SYMPTOMS: ICD-10-CM

## 2025-02-09 DIAGNOSIS — R50.9 FEVER, UNSPECIFIED FEVER CAUSE: ICD-10-CM

## 2025-02-09 LAB
Lab: NORMAL
PERFORMING INSTRUMENT: NORMAL
QC PASS/FAIL: NORMAL
S PYO AG THROAT QL: NORMAL
SARS-COV-2, POC: NORMAL

## 2025-02-09 RX ORDER — IPRATROPIUM BROMIDE AND ALBUTEROL SULFATE 2.5; .5 MG/3ML; MG/3ML
1 SOLUTION RESPIRATORY (INHALATION) ONCE
Status: COMPLETED | OUTPATIENT
Start: 2025-02-09 | End: 2025-02-09

## 2025-02-09 RX ORDER — OSELTAMIVIR PHOSPHATE 75 MG/1
75 CAPSULE ORAL DAILY
Qty: 7 CAPSULE | Refills: 0 | Status: SHIPPED | OUTPATIENT
Start: 2025-02-09 | End: 2025-02-16

## 2025-02-09 RX ORDER — AZITHROMYCIN 250 MG/1
TABLET, FILM COATED ORAL
Qty: 6 TABLET | Refills: 0 | Status: SHIPPED | OUTPATIENT
Start: 2025-02-09 | End: 2025-02-19

## 2025-02-09 RX ORDER — GUAIFENESIN/DEXTROMETHORPHAN 100-10MG/5
5 SYRUP ORAL 3 TIMES DAILY PRN
Qty: 120 ML | Refills: 0 | Status: SHIPPED | OUTPATIENT
Start: 2025-02-09 | End: 2025-02-19

## 2025-02-09 RX ORDER — OXYCODONE AND ACETAMINOPHEN 5; 325 MG/1; MG/1
1 TABLET ORAL EVERY 4 HOURS PRN
COMMUNITY
Start: 2025-01-30

## 2025-02-09 RX ADMIN — IPRATROPIUM BROMIDE AND ALBUTEROL SULFATE 1 DOSE: 2.5; .5 SOLUTION RESPIRATORY (INHALATION) at 15:31

## 2025-02-25 ENCOUNTER — TELEPHONE (OUTPATIENT)
Facility: CLINIC | Age: 75
End: 2025-02-25

## 2025-02-25 NOTE — TELEPHONE ENCOUNTER
Attempted to contact patient regarding upcoming Medicare wellness appointment and completion of HRA questionnaire. LVM for patient to please return call at [phone].

## 2025-02-26 ENCOUNTER — OFFICE VISIT (OUTPATIENT)
Facility: CLINIC | Age: 75
End: 2025-02-26
Payer: MEDICARE

## 2025-02-26 VITALS
RESPIRATION RATE: 16 BRPM | HEIGHT: 68 IN | TEMPERATURE: 97.2 F | OXYGEN SATURATION: 98 % | HEART RATE: 60 BPM | SYSTOLIC BLOOD PRESSURE: 120 MMHG | WEIGHT: 200 LBS | DIASTOLIC BLOOD PRESSURE: 62 MMHG | BODY MASS INDEX: 30.31 KG/M2

## 2025-02-26 DIAGNOSIS — E03.9 ACQUIRED HYPOTHYROIDISM: ICD-10-CM

## 2025-02-26 DIAGNOSIS — Z00.00 MEDICARE ANNUAL WELLNESS VISIT, SUBSEQUENT: Primary | ICD-10-CM

## 2025-02-26 DIAGNOSIS — I10 ESSENTIAL HYPERTENSION: ICD-10-CM

## 2025-02-26 PROBLEM — I89.0 LYMPHEDEMA: Status: ACTIVE | Noted: 2025-02-26

## 2025-02-26 PROCEDURE — 1125F AMNT PAIN NOTED PAIN PRSNT: CPT

## 2025-02-26 PROCEDURE — 3078F DIAST BP <80 MM HG: CPT

## 2025-02-26 PROCEDURE — 1159F MED LIST DOCD IN RCRD: CPT

## 2025-02-26 PROCEDURE — 1160F RVW MEDS BY RX/DR IN RCRD: CPT

## 2025-02-26 PROCEDURE — 3074F SYST BP LT 130 MM HG: CPT

## 2025-02-26 PROCEDURE — G0439 PPPS, SUBSEQ VISIT: HCPCS

## 2025-02-26 PROCEDURE — 1123F ACP DISCUSS/DSCN MKR DOCD: CPT

## 2025-02-26 PROCEDURE — 3017F COLORECTAL CA SCREEN DOC REV: CPT

## 2025-02-26 SDOH — ECONOMIC STABILITY: FOOD INSECURITY: WITHIN THE PAST 12 MONTHS, YOU WORRIED THAT YOUR FOOD WOULD RUN OUT BEFORE YOU GOT MONEY TO BUY MORE.: NEVER TRUE

## 2025-02-26 SDOH — ECONOMIC STABILITY: FOOD INSECURITY: WITHIN THE PAST 12 MONTHS, THE FOOD YOU BOUGHT JUST DIDN'T LAST AND YOU DIDN'T HAVE MONEY TO GET MORE.: NEVER TRUE

## 2025-02-26 SDOH — HEALTH STABILITY: PHYSICAL HEALTH: ON AVERAGE, HOW MANY DAYS PER WEEK DO YOU ENGAGE IN MODERATE TO STRENUOUS EXERCISE (LIKE A BRISK WALK)?: 3 DAYS

## 2025-02-26 SDOH — ECONOMIC STABILITY: INCOME INSECURITY: IN THE LAST 12 MONTHS, WAS THERE A TIME WHEN YOU WERE NOT ABLE TO PAY THE MORTGAGE OR RENT ON TIME?: NO

## 2025-02-26 SDOH — ECONOMIC STABILITY: TRANSPORTATION INSECURITY
IN THE PAST 12 MONTHS, HAS THE LACK OF TRANSPORTATION KEPT YOU FROM MEDICAL APPOINTMENTS OR FROM GETTING MEDICATIONS?: NO

## 2025-02-26 SDOH — HEALTH STABILITY: PHYSICAL HEALTH: ON AVERAGE, HOW MANY MINUTES DO YOU ENGAGE IN EXERCISE AT THIS LEVEL?: 30 MIN

## 2025-02-26 ASSESSMENT — ENCOUNTER SYMPTOMS
CHEST TIGHTNESS: 0
ABDOMINAL PAIN: 0
SHORTNESS OF BREATH: 0

## 2025-02-26 ASSESSMENT — LIFESTYLE VARIABLES
HOW MANY STANDARD DRINKS CONTAINING ALCOHOL DO YOU HAVE ON A TYPICAL DAY: PATIENT DOES NOT DRINK
HOW OFTEN DO YOU HAVE A DRINK CONTAINING ALCOHOL: NEVER
HOW OFTEN DO YOU HAVE A DRINK CONTAINING ALCOHOL: 1
HOW OFTEN DO YOU HAVE SIX OR MORE DRINKS ON ONE OCCASION: 1
HOW MANY STANDARD DRINKS CONTAINING ALCOHOL DO YOU HAVE ON A TYPICAL DAY: 0

## 2025-02-26 ASSESSMENT — PATIENT HEALTH QUESTIONNAIRE - PHQ9
SUM OF ALL RESPONSES TO PHQ QUESTIONS 1-9: 0
1. LITTLE INTEREST OR PLEASURE IN DOING THINGS: NOT AT ALL
2. FEELING DOWN, DEPRESSED OR HOPELESS: NOT AT ALL
SUM OF ALL RESPONSES TO PHQ9 QUESTIONS 1 & 2: 0
SUM OF ALL RESPONSES TO PHQ QUESTIONS 1-9: 0

## 2025-02-26 NOTE — PROGRESS NOTES
Chief Complaint   Patient presents with    Medicare AWV     PHQ-9 Total Score: 0 (2/26/2025  8:53 AM)    \"Have you been to the ER, urgent care clinic since your last visit?  Hospitalized since your last visit?\"    NO    “Have you seen or consulted any other health care providers outside our system since your last visit?”    NO    “Have you had a colorectal cancer screening such as a colonoscopy/FIT/Cologuard?    YES - Type: Colonoscopy - Where: [facility].Nurse/CMA to request most recent records if not in the chart     Date of last Colonoscopy: 7/13/2018  No cologuard on file  No FIT/FOBT on file   No flexible sigmoidoscopy on file       Click Here for Release of Records Request     /62 (Site: Left Upper Arm, Position: Sitting, Cuff Size: Large Adult)   Pulse 60   Temp 97.2 °F (36.2 °C) (Temporal)   Resp 16   Ht 1.727 m (5' 8\")   Wt 90.7 kg (200 lb)   SpO2 98%   BMI 30.41 kg/m²       2/26/2025     8:53 AM   Amb Fall Risk Assessment and TUG Test   Do you feel unsteady or are you worried about falling?  yes   2 or more falls in past year? no   Fall with injury in past year? yes     Medicare Awv Health Risk Assessment / Depression Screen       Question 2/26/2025  8:53 AM EST - Filed by Patient    Fall Risk Screening     Do you feel unsteady or are you worried about falling? yes    Have you fallen 2 or more times in the past year?   no    Have you had any fall with injury in the past year?   yes    Health Risk Assessment / General     In general, how would you say your health is? Very Good    In the past 7 days, have you experienced any of the following: New or Increased Pain, New or Increased Fatigue, Loneliness, Social Isolation, Stress or Anger? No    Do you have a Living Will? Yes    Health Habits/ Nutrition     On average, how many days per week do you engage in moderate to strenuous exercise (like a brisk walk)? 3 days    On average, how many minutes do you engage in exercise at this level? 30 min    Do you 
Normal rate and regular rhythm.      Heart sounds: Normal heart sounds.   Pulmonary:      Effort: Pulmonary effort is normal.      Breath sounds: Normal breath sounds.   Abdominal:      Palpations: Abdomen is soft.   Musculoskeletal:      Cervical back: Neck supple.   Skin:     General: Skin is warm and dry.      Capillary Refill: Capillary refill takes less than 2 seconds.   Neurological:      Mental Status: He is alert. Mental status is at baseline.   Psychiatric:         Behavior: Behavior normal.                     Allergies   Allergen Reactions    Imiquimod Rash     Prior to Visit Medications    Medication Sig Taking? Authorizing Provider   atorvastatin (LIPITOR) 20 MG tablet TAKE ONE TABLET BY MOUTH ONE TIME DAILY Yes Leslie Billings APRN - CNP   clopidogrel (PLAVIX) 75 MG tablet TAKE ONE TABLET BY MOUTH ONE TIME DAILY Yes Leslie Billings APRN - CNP   benazepril-hydrochlorthiazide (LOTENSIN HCT) 10-12.5 MG per tablet TAKE ONE TABLET BY MOUTH ONE TIME DAILY FOR BLOOD PRESSURE Yes Leslie Billings APRN - CNP   levothyroxine (SYNTHROID) 100 MCG tablet TAKE ONE TABLET BY MOUTH ONE TIME DAILY Yes Leslie Billings APRN - CNP   metroNIDAZOLE (METROGEL) 0.75 % gel Apply topically 2 times daily Apply topically 2 times daily. Yes Leslie Billings APRN - CNP   VITAMIN D3 125 MCG (5000 UT) CAPS capsule TAKE ONE CAPSULE BY MOUTH ONE TIME DAILY Yes Colette Oliva MD   latanoprost (XALATAN) 0.005 % ophthalmic solution Apply 1 drop to eye Yes Automatic Reconciliation, Ar   tadalafil (CIALIS) 5 MG tablet Take 1 tablet by mouth daily Yes Automatic Reconciliation, Ar   testosterone cypionate (DEPOTESTOTERONE CYPIONATE) 200 MG/ML injection INJECT 1 ML INTRAMUSCULARLY EVERY 3 WEEKS Yes Automatic Reconciliation, Ar       CareTeam (Including outside providers/suppliers regularly involved in providing care):   Patient Care Team:  Leslie Billings APRN - CNP as PCP - General (Nurse Practitioner)  Leslie Billings APRN - CNP as PCP -

## 2025-02-26 NOTE — PATIENT INSTRUCTIONS
help keep your heart healthy and prevent heart disease. This lifestyle includes eating healthy, being active, staying at a weight that's healthy for you, and not smoking or using tobacco. It also includes taking medicines as directed, managing other health conditions, and trying to get a healthy amount of sleep.  Follow-up care is a key part of your treatment and safety. Be sure to make and go to all appointments, and call your doctor if you are having problems. It's also a good idea to know your test results and keep a list of the medicines you take.  How can you care for yourself at home?  Diet    Use less salt when you cook and eat. This helps lower your blood pressure. Taste food before salting. Add only a little salt when you think you need it. With time, your taste buds will adjust to less salt.     Eat fewer snack items, fast foods, canned soups, and other high-salt, high-fat, processed foods.     Read food labels and try to avoid saturated and trans fats. They increase your risk of heart disease by raising cholesterol levels.     Limit the amount of solid fat--butter, margarine, and shortening--you eat. Use olive, peanut, or canola oil when you cook. Bake, broil, and steam foods instead of frying them.     Eat a variety of fruit and vegetables every day. Dark green, deep orange, red, or yellow fruits and vegetables are especially good for you. Examples include spinach, carrots, peaches, and berries.     Foods high in fiber can reduce your cholesterol and provide important vitamins and minerals. High-fiber foods include whole-grain cereals and breads, oatmeal, beans, brown rice, citrus fruits, and apples.     Eat lean proteins. Heart-healthy proteins include seafood, lean meats and poultry, eggs, beans, peas, nuts, seeds, and soy products.     Limit drinks and foods with added sugar. These include candy, desserts, and soda pop.   Heart-healthy lifestyle    If your doctor recommends it, get more exercise. For

## 2025-04-11 DIAGNOSIS — R60.0 LOCALIZED EDEMA: Primary | ICD-10-CM

## 2025-04-11 DIAGNOSIS — I89.0 LYMPHEDEMA: ICD-10-CM

## 2025-04-15 ENCOUNTER — HOSPITAL ENCOUNTER (OUTPATIENT)
Facility: HOSPITAL | Age: 75
Setting detail: RECURRING SERIES
Discharge: HOME | End: 2025-04-18
Payer: MEDICARE

## 2025-04-15 PROCEDURE — 97760 ORTHOTIC MGMT&TRAING 1ST ENC: CPT

## 2025-04-15 PROCEDURE — 97535 SELF CARE MNGMENT TRAINING: CPT

## 2025-04-15 PROCEDURE — 97161 PT EVAL LOW COMPLEX 20 MIN: CPT

## 2025-04-15 NOTE — THERAPY EVALUATION
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 [x]   I87.2 Venous Insufficiency   [x]   I89.0 Lymphedema, other secondary  []   I83.019 Venous Stasis Ulcer LE, Right   []   I89.9 Unspecified Lymphatic Disorder []   I83.029 Venous Stasis Ulcer LE, Left   [x]   R60.9 Swelling not relieved by elevation []   Q82.0 Hereditary/ Congenital Lymphedema   []   C50.211 Malignant neoplasm of breast, Right []   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   []   C50.212 Malignant neoplasm of breast, Left []   L03.115 LE Cellulitis, Right   []    []   L03.116 LE Cellulitis, Left                                                           Claudio Salas    1950  Page 2 of 2    Physician Order for DME for Diagnosis of Secondary Lymphedema as Listed on Statement of Medical Necessity, Page 1        Recommended Product:  Units Upper Extremity Rt Lt Units Lower Extremity Rt Lt    Circ-Aid, Ready Wrap, Sigvaris Arm    Inelastic binders (Circ-Aid, Farrow)  []Foot   []Below Knee   []Knee   []Thigh      Circ-Aid Ready Wrap, Sigvaris hand    Dane Kaushal, night use []Full Leg  []Lower Leg      Tribute Arm, night use    Tribute, night use  []Full Leg  []Lower Leg      Dane Kaushal Arm, night use    Michael Sleeve Leg/ Foot, night use      Gradiant Compression Sleeves & Gloves  []Custom [] RM Arm:  []CCL1 []CCL2 []CCL3  []Custom [] RM Glove: []CCL1 []CCL2 []CCL3     6 Gradient Compression Stockings   [x]Custom  []RM Lower Extremity:   []CCL1       [x]CCL2         []CCL3   [x]Knee       []Thigh        []Waist Length x x    Michael sleeve arm w/ hand, night use    Tribute Wrap, night use      Compression Bra   1 Medi doffing stick      Compression Vest         The above patient was referred for treatment of Lymphedema due to the diagnosis 
several weeks to receive stockings due to need for signed eval/LMN from MD and then obtaining insurance authorization. Patient continues to benefit from aspects of complete decongestive therapy (CDT) including manual lymphatic drainage (MLD) technique, short-stretch compression system to maintain limb volumes, and kinesiotaping as appropriate. Patient will receive instruction in proper skin care to recognize signs/symptoms of and prevent infection, therapeutic exercise, and self-MLD for independent home program and restorative lymphatic performance. This care is medically necessary due to the infection risk with lymphedema, and to improve functional activities.  CDT is necessary to resolve swelling to allow patient to return to wearing normal clothes/footwear, and prevent worsening of symptoms, such  as venous stasis ulcerations, infections, or hospitalizations.  Patient will be independent with home program strategies to allow improved ADL ability and mobility and to allow patient to return to greatest functional independence.    Evaluation Complexity:  History:  HIGH Complexity :3+ comorbidities / personal factors will impact the outcome/ POC ; Examination:  LOW Complexity : 1-2 Standardized tests and measures addressing body structure, function, activity limitation and / or participation in recreation  ;Presentation:  LOW Complexity : Stable, uncomplicated  ;Clinical Decision Making:  MEDIUM Complexity : FOTO score of 26-74 Overall Complexity Rating: LOW   Problem List: edema affection function   Treatment Plan may include any combination of the followin Therapeutic Exercise, 53560 Neuromuscular Re-Education, 32413 Manual Therapy, 54075 Therapeutic Activity, 62806 Self Care/Home Management, 27192 Orthotic Management and Training, and 62045 Orthotic Management and Training, Subsequent  Patient / Family readiness to learn indicated by: asking questions and return verbalization   Persons(s) to be included in

## 2025-06-06 DIAGNOSIS — E03.9 ACQUIRED HYPOTHYROIDISM: ICD-10-CM

## 2025-06-06 DIAGNOSIS — E78.2 MIXED HYPERLIPIDEMIA: ICD-10-CM

## 2025-06-06 DIAGNOSIS — G45.9 TIA (TRANSIENT ISCHEMIC ATTACK): ICD-10-CM

## 2025-06-06 DIAGNOSIS — I10 ESSENTIAL HYPERTENSION: ICD-10-CM

## 2025-06-09 RX ORDER — ATORVASTATIN CALCIUM 20 MG/1
20 TABLET, FILM COATED ORAL DAILY
Qty: 90 TABLET | Refills: 1 | Status: SHIPPED | OUTPATIENT
Start: 2025-06-09

## 2025-06-09 RX ORDER — LEVOTHYROXINE SODIUM 100 UG/1
100 TABLET ORAL DAILY
Qty: 90 TABLET | Refills: 1 | Status: SHIPPED | OUTPATIENT
Start: 2025-06-09

## 2025-06-09 RX ORDER — BENAZEPRIL HYDROCHLORIDE AND HYDROCHLOROTHIAZIDE 10; 12.5 MG/1; MG/1
TABLET ORAL
Qty: 90 TABLET | Refills: 1 | Status: SHIPPED | OUTPATIENT
Start: 2025-06-09

## 2025-06-09 RX ORDER — CLOPIDOGREL BISULFATE 75 MG/1
75 TABLET ORAL DAILY
Qty: 90 TABLET | Refills: 1 | Status: SHIPPED | OUTPATIENT
Start: 2025-06-09

## 2025-06-09 NOTE — TELEPHONE ENCOUNTER
3/5/2026  9:00 AM MEDICARE ANNUAL WELL VISIT [facility] Leslie Billings, POOJA - CNP    Appointment Notes:   Return in about 1 year (around 2/26/2026) for Medicare Annual Wellness

## 2025-07-10 ENCOUNTER — CLINICAL DOCUMENTATION (OUTPATIENT)
Age: 75
End: 2025-07-10